# Patient Record
Sex: MALE | Race: WHITE | Employment: FULL TIME | URBAN - METROPOLITAN AREA
[De-identification: names, ages, dates, MRNs, and addresses within clinical notes are randomized per-mention and may not be internally consistent; named-entity substitution may affect disease eponyms.]

---

## 2018-02-12 ENCOUNTER — HOSPITAL ENCOUNTER (OUTPATIENT)
Dept: LAB | Age: 63
Discharge: HOME | End: 2018-02-12
Payer: COMMERCIAL

## 2018-02-12 ENCOUNTER — OFFICE VISIT (OUTPATIENT)
Dept: FAMILY MEDICINE CLINIC | Age: 63
End: 2018-02-12

## 2018-02-12 VITALS
SYSTOLIC BLOOD PRESSURE: 193 MMHG | RESPIRATION RATE: 18 BRPM | BODY MASS INDEX: 30.34 KG/M2 | HEART RATE: 67 BPM | DIASTOLIC BLOOD PRESSURE: 94 MMHG | WEIGHT: 224 LBS | TEMPERATURE: 96.7 F | OXYGEN SATURATION: 97 % | HEIGHT: 72 IN

## 2018-02-12 DIAGNOSIS — R33.9 URINARY RETENTION: ICD-10-CM

## 2018-02-12 DIAGNOSIS — N31.9 NEUROGENIC BLADDER: Primary | ICD-10-CM

## 2018-02-12 DIAGNOSIS — Z11.59 NEED FOR HEPATITIS C SCREENING TEST: ICD-10-CM

## 2018-02-12 DIAGNOSIS — I10 ESSENTIAL HYPERTENSION: ICD-10-CM

## 2018-02-12 DIAGNOSIS — E78.5 HYPERLIPIDEMIA, UNSPECIFIED HYPERLIPIDEMIA TYPE: ICD-10-CM

## 2018-02-12 DIAGNOSIS — N40.0 BENIGN PROSTATIC HYPERPLASIA, UNSPECIFIED WHETHER LOWER URINARY TRACT SYMPTOMS PRESENT: ICD-10-CM

## 2018-02-12 LAB
ALBUMIN SERPL-MCNC: 4.6 G/DL (ref 3.4–5)
ALBUMIN/GLOB SERPL: 1.4 {RATIO} (ref 0.8–1.7)
ALP SERPL-CCNC: 85 U/L (ref 45–117)
ALT SERPL-CCNC: 36 U/L (ref 16–61)
AMORPH CRY URNS QL MICRO: ABNORMAL
ANION GAP SERPL CALC-SCNC: 11 MMOL/L (ref 3–18)
APPEARANCE UR: ABNORMAL
AST SERPL-CCNC: 25 U/L (ref 15–37)
BACTERIA URNS QL MICRO: ABNORMAL /HPF
BASOPHILS # BLD: 0.1 K/UL (ref 0–0.06)
BASOPHILS NFR BLD: 1 % (ref 0–2)
BILIRUB SERPL-MCNC: 0.5 MG/DL (ref 0.2–1)
BILIRUB UR QL STRIP: NEGATIVE
BILIRUB UR QL: NEGATIVE
BUN SERPL-MCNC: 19 MG/DL (ref 7–18)
BUN/CREAT SERPL: 18 (ref 12–20)
CALCIUM SERPL-MCNC: 9.3 MG/DL (ref 8.5–10.1)
CHLORIDE SERPL-SCNC: 102 MMOL/L (ref 100–108)
CHOLEST SERPL-MCNC: 142 MG/DL
CO2 SERPL-SCNC: 28 MMOL/L (ref 21–32)
COLOR UR: YELLOW
CREAT SERPL-MCNC: 1.06 MG/DL (ref 0.6–1.3)
DIFFERENTIAL METHOD BLD: ABNORMAL
EOSINOPHIL # BLD: 0 K/UL (ref 0–0.4)
EOSINOPHIL NFR BLD: 1 % (ref 0–5)
EPITH CASTS URNS QL MICRO: ABNORMAL /LPF (ref 0–5)
ERYTHROCYTE [DISTWIDTH] IN BLOOD BY AUTOMATED COUNT: 14.3 % (ref 11.6–14.5)
GLOBULIN SER CALC-MCNC: 3.3 G/DL (ref 2–4)
GLUCOSE SERPL-MCNC: 97 MG/DL (ref 74–99)
GLUCOSE UR STRIP.AUTO-MCNC: NEGATIVE MG/DL
GLUCOSE UR-MCNC: NEGATIVE MG/DL
HCT VFR BLD AUTO: 54.3 % (ref 36–48)
HDLC SERPL-MCNC: 34 MG/DL (ref 40–60)
HDLC SERPL: 4.2 {RATIO} (ref 0–5)
HGB BLD-MCNC: 18.3 G/DL (ref 13–16)
HGB UR QL STRIP: NEGATIVE
KETONES P FAST UR STRIP-MCNC: NEGATIVE MG/DL
KETONES UR QL STRIP.AUTO: NEGATIVE MG/DL
LDLC SERPL CALC-MCNC: 83 MG/DL (ref 0–100)
LEUKOCYTE ESTERASE UR QL STRIP.AUTO: ABNORMAL
LIPID PROFILE,FLP: ABNORMAL
LYMPHOCYTES # BLD: 2 K/UL (ref 0.9–3.6)
LYMPHOCYTES NFR BLD: 34 % (ref 21–52)
MCH RBC QN AUTO: 30.7 PG (ref 24–34)
MCHC RBC AUTO-ENTMCNC: 33.7 G/DL (ref 31–37)
MCV RBC AUTO: 91 FL (ref 74–97)
MONOCYTES # BLD: 0.8 K/UL (ref 0.05–1.2)
MONOCYTES NFR BLD: 14 % (ref 3–10)
NEUTS SEG # BLD: 2.9 K/UL (ref 1.8–8)
NEUTS SEG NFR BLD: 50 % (ref 40–73)
NITRITE UR QL STRIP.AUTO: NEGATIVE
PH UR STRIP: 7 [PH] (ref 4.6–8)
PH UR STRIP: 7.5 [PH] (ref 5–8)
PLATELET # BLD AUTO: 145 K/UL (ref 135–420)
PMV BLD AUTO: 12.1 FL (ref 9.2–11.8)
POTASSIUM SERPL-SCNC: 4.4 MMOL/L (ref 3.5–5.5)
PROT SERPL-MCNC: 7.9 G/DL (ref 6.4–8.2)
PROT UR QL STRIP: NORMAL
PROT UR STRIP-MCNC: ABNORMAL MG/DL
RBC # BLD AUTO: 5.97 M/UL (ref 4.7–5.5)
RBC #/AREA URNS HPF: 0 /HPF (ref 0–5)
SODIUM SERPL-SCNC: 141 MMOL/L (ref 136–145)
SP GR UR REFRACTOMETRY: 1.02 (ref 1–1.03)
SP GR UR STRIP: 1.01 (ref 1–1.03)
TRIGL SERPL-MCNC: 125 MG/DL (ref ?–150)
UA UROBILINOGEN AMB POC: NORMAL (ref 0.2–1)
URINALYSIS CLARITY POC: CLEAR
URINALYSIS COLOR POC: YELLOW
URINE BLOOD POC: NEGATIVE
URINE LEUKOCYTES POC: NORMAL
URINE NITRITES POC: NEGATIVE
UROBILINOGEN UR QL STRIP.AUTO: 0.2 EU/DL (ref 0.2–1)
VLDLC SERPL CALC-MCNC: 25 MG/DL
WBC # BLD AUTO: 5.8 K/UL (ref 4.6–13.2)
WBC URNS QL MICRO: ABNORMAL /HPF (ref 0–4)

## 2018-02-12 PROCEDURE — 87086 URINE CULTURE/COLONY COUNT: CPT | Performed by: FAMILY MEDICINE

## 2018-02-12 PROCEDURE — 81001 URINALYSIS AUTO W/SCOPE: CPT | Performed by: FAMILY MEDICINE

## 2018-02-12 PROCEDURE — 80053 COMPREHEN METABOLIC PANEL: CPT | Performed by: FAMILY MEDICINE

## 2018-02-12 PROCEDURE — 80061 LIPID PANEL: CPT | Performed by: FAMILY MEDICINE

## 2018-02-12 PROCEDURE — 86803 HEPATITIS C AB TEST: CPT | Performed by: FAMILY MEDICINE

## 2018-02-12 PROCEDURE — 87186 SC STD MICRODIL/AGAR DIL: CPT | Performed by: FAMILY MEDICINE

## 2018-02-12 PROCEDURE — 84153 ASSAY OF PSA TOTAL: CPT | Performed by: UROLOGY

## 2018-02-12 PROCEDURE — 87077 CULTURE AEROBIC IDENTIFY: CPT | Performed by: FAMILY MEDICINE

## 2018-02-12 PROCEDURE — 85025 COMPLETE CBC W/AUTO DIFF WBC: CPT | Performed by: FAMILY MEDICINE

## 2018-02-12 RX ORDER — LOSARTAN POTASSIUM AND HYDROCHLOROTHIAZIDE 12.5; 1 MG/1; MG/1
1 TABLET ORAL DAILY
COMMUNITY
Start: 2018-02-12 | End: 2018-07-19 | Stop reason: SDUPTHER

## 2018-02-12 RX ORDER — ATORVASTATIN CALCIUM 40 MG/1
40 TABLET, FILM COATED ORAL DAILY
COMMUNITY
Start: 2018-02-12 | End: 2018-02-26

## 2018-02-12 NOTE — MR AVS SNAPSHOT
[facility] [address] [facility] 
[phone] Patient: Calvin Sifuentes MRN: [id number] AHD:9/85/[id number] Visit Information Date & Time Provider Department Dept. Phone Encounter #  
 2/12/2018  9:30 AM MD Jazmine Case. [address] [place] Lindsay Berry [phone] [id number] Follow-up Instructions Return in about 2 weeks (around 2/26/2018), or if symptoms worsen or fail to improve, for htn, dyslipidemia. 9/13/2018  9:45 AM  
Any with Brent Novoa MD  
[facility] ([facility]) Appt Note: EP- BPH, Bladder Stone, UTI, 8 month follow up [address] [place] [id number]  
[address] [facility] [address] [facility] [address] [facility] [address] Upcoming Health Maintenance Date Due Hepatitis C Screening 1955 FOBT Q 1 YEAR AGE 50-75 3/26/2005 Pneumococcal 19-64 Highest Risk (2 of 3 - PCV13) 8/4/2018 DTaP/Tdap/Td series (2 - Td) 2/12/2028 Allergies as of 2/12/2018  Review Complete On: 2/12/2018 By: Shanon Girard MD  
  
 Severity Noted Reaction Type Reactions Penicillins  04/20/2012    Other (comments) Current Immunizations  Never Reviewed No immunizations on file. Not reviewed this visit You Were Diagnosed With   
  
 Codes Comments Neurogenic bladder    -  Primary ICD-10-CM: N31.9 ICD-9-CM: 596.54 Essential hypertension     ICD-10-CM: I10 
ICD-9-CM: 401.9 Hyperlipidemia, unspecified hyperlipidemia type     ICD-10-CM: E78.5 ICD-9-CM: 272.4 Need for hepatitis C screening test     ICD-10-CM: Z11.59 
ICD-9-CM: V73.89 Urinary retention     ICD-10-CM: R33.9 ICD-9-CM: 788.20 Vitals BP Pulse Temp Resp Height(growth percentile) Weight(growth percentile) (!) 193/94 (BP 1 Location: Right arm, BP Patient Position: Sitting) 67 96.7 °F (35.9 °C) (Oral) 18 6' (1.829 m) 224 lb (101.6 kg) SpO2 BMI Smoking Status 97% 30.38 kg/m2 Never Smoker Vitals History BMI and BSA Data Body Mass Index Body Surface Area  
 30.38 kg/m 2 2.27 m 2 Preferred Pharmacy Pharmacy Name Phone [facility] [address] [phone] Your Updated Medication List  
  
   
This list is accurate as of: 2/12/18 10:19 AM.  Always use your most recent med list.  
  
  
  
  
 atorvastatin 40 mg tablet Commonly known as:  LIPITOR Take 1 Tab by mouth daily. losartan-hydroCHLOROthiazide 100-12.5 mg per tablet Commonly known as:  HYZAAR Take 1 Tab by mouth daily. multivitamin tablet Commonly known as:  ONE A DAY Take 1 Tab by mouth daily. nitrofurantoin (macrocrystal-monohydrate) 100 mg capsule Commonly known as:  MACROBID Take 1 Cap by mouth two (2) times a day. Indications: UTI  
  
 VITAMIN B-12 1,000 mcg tablet Generic drug:  cyanocobalamin Take 1,000 mcg by mouth daily. We Performed the Following AMB POC URINALYSIS DIP STICK AUTO W/O MICRO [94753 CPT(R)] Follow-up Instructions Return in about 2 weeks (around 2/26/2018), or if symptoms worsen or fail to improve, for htn, dyslipidemia. To-Do List   
 02/12/2018 Lab:  CBC WITH AUTOMATED DIFF   
  
 02/12/2018 Microbiology:  CULTURE, URINE   
  
 02/12/2018 Lab:  HEPATITIS C AB   
  
 02/12/2018 Lab:  LIPID PANEL   
  
 02/12/2018 Lab:  METABOLIC PANEL, COMPREHENSIVE   
  
 02/12/2018 Lab:  URINALYSIS W/MICROSCOPIC Patient Instructions DASH Diet: Care Instructions Your Care Instructions The DASH diet is an eating plan that can help lower your blood pressure. DASH stands for Dietary Approaches to Stop Hypertension. Hypertension is high blood pressure.  
The DASH diet focuses on eating foods that are high in calcium, potassium, and magnesium. These nutrients can lower blood pressure. The foods that are highest in these nutrients are fruits, vegetables, low-fat dairy products, nuts, seeds, and legumes. But taking calcium, potassium, and magnesium supplements instead of eating foods that are high in those nutrients does not have the same effect. The DASH diet also includes whole grains, fish, and poultry. The DASH diet is one of several lifestyle changes your doctor may recommend to lower your high blood pressure. Your doctor may also want you to decrease the amount of sodium in your diet. Lowering sodium while following the DASH diet can lower blood pressure even further than just the DASH diet alone. Follow-up care is a key part of your treatment and safety. Be sure to make and go to all appointments, and call your doctor if you are having problems. It's also a good idea to know your test results and keep a list of the medicines you take. How can you care for yourself at home? Following the DASH diet · Eat 4 to 5 servings of fruit each day. A serving is 1 medium-sized piece of fruit, ½ cup chopped or canned fruit, 1/4 cup dried fruit, or 4 ounces (½ cup) of fruit juice. Choose fruit more often than fruit juice. · Eat 4 to 5 servings of vegetables each day. A serving is 1 cup of lettuce or raw leafy vegetables, ½ cup of chopped or cooked vegetables, or 4 ounces (½ cup) of vegetable juice. Choose vegetables more often than vegetable juice. · Get 2 to 3 servings of low-fat and fat-free dairy each day. A serving is 8 ounces of milk, 1 cup of yogurt, or 1 ½ ounces of cheese. · Eat 6 to 8 servings of grains each day. A serving is 1 slice of bread, 1 ounce of dry cereal, or ½ cup of cooked rice, pasta, or cooked cereal. Try to choose whole-grain products as much as possible. · Limit lean meat, poultry, and fish to 2 servings each day. A serving is 3 ounces, about the size of a deck of cards. · Eat 4 to 5 servings of nuts, seeds, and legumes (cooked dried beans, lentils, and split peas) each week. A serving is 1/3 cup of nuts, 2 tablespoons of seeds, or ½ cup of cooked beans or peas. · Limit fats and oils to 2 to 3 servings each day. A serving is 1 teaspoon of vegetable oil or 2 tablespoons of salad dressing. · Limit sweets and added sugars to 5 servings or less a week. A serving is 1 tablespoon jelly or jam, ½ cup sorbet, or 1 cup of lemonade. · Eat less than 2,300 milligrams (mg) of sodium a day. If you limit your sodium to 1,500 mg a day, you can lower your blood pressure even more. Tips for success · Start small. Do not try to make dramatic changes to your diet all at once. You might feel that you are missing out on your favorite foods and then be more likely to not follow the plan. Make small changes, and stick with them. Once those changes become habit, add a few more changes. · Try some of the following: ¨ Make it a goal to eat a fruit or vegetable at every meal and at snacks. This will make it easy to get the recommended amount of fruits and vegetables each day. ¨ Try yogurt topped with fruit and nuts for a snack or healthy dessert. ¨ Add lettuce, tomato, cucumber, and onion to sandwiches. ¨ Combine a ready-made pizza crust with low-fat mozzarella cheese and lots of vegetable toppings. Try using tomatoes, squash, spinach, broccoli, carrots, cauliflower, and onions. ¨ Have a variety of cut-up vegetables with a low-fat dip as an appetizer instead of chips and dip. ¨ Sprinkle sunflower seeds or chopped almonds over salads. Or try adding chopped walnuts or almonds to cooked vegetables. ¨ Try some vegetarian meals using beans and peas. Add garbanzo or kidney beans to salads. Make burritos and tacos with mashed dotson beans or black beans. Where can you learn more? Go to http://amaya-pepito.info/.  
Enter E623 in the search box to learn more about \"DASH Diet: Care Instructions. \" Current as of: September 21, 2016 Content Version: 11.4 © 5916-7121 Phone.com. Care instructions adapted under license by [employer] (which disclaims liability or warranty for this information). If you have questions about a medical condition or this instruction, always ask your healthcare professional. [address] any warranty or liability for your use of this information. Introducing [facility]! Dear Stephan Price: Thank you for requesting a G[employer] account. Our records indicate that you already have an active Gamer Gu[employer] account. You can access your account anytime at [email or web address] Did you know that you can access your hospital and ER discharge instructions at any time in Gamer Guides? You can also review all of your test results from your hospital stay or ER visit. Additional Information If you have questions, please visit the Frequently Asked Questions section of the [employer]amer [employer]ides website at [email or web address] Remember, [employer]amer Guides is NOT to be used for urgent needs. For medical emergencies, dial 911. Now available from your iPhone and Android! Please provide this summary of care documentation to your next provider. Your primary care clinician is listed as Nat Sellers. If you have any questions after today's visit, please call [phone].

## 2018-02-12 NOTE — PATIENT INSTRUCTIONS

## 2018-02-12 NOTE — PROGRESS NOTES
Chief Complaint   Patient presents with   Cezar Garcia Establish Care     1. Have you been to the ER, urgent care clinic since your last visit? Hospitalized since your last visit? No    2. Have you seen or consulted any other health care providers outside of the [address] [place] [address] since your last visit? Include any pap smears or colon screening. No     BAILEY Chrsitensen comes in  to establish care. Hypertension: Patient has a history of hypertension. Lately has been off blood pressure medication. This has been for about a year. He does have a blood pressure cuff at home that he checks his blood pressure has been stable. Systolics have been around 170. Diastolics have been below 80. Today however his blood pressure is high with a systolic of 783. Diastolic is slightly elevated. We discussed the needs to control blood pressure. He does understand the risks of elevated blood pressure. He denies headache, changes in vision or focal weakness. In the past he has used Hyzaar for his headache. I did advise that he take 1 tablet of Hyzaar today. After that he will wants to keep a blood pressure log of his numbers and follow-up in about 2 weeks. If elevated he is willing to get back on his medication. Will check his labs today. Urology: Patient has a history of neurogenic bladder and bladder stones. He does do the current self-catheterization. Has been seen by urology and the is on nitrofurantoin. Currently has some discomfort when he passes urine. We did do a urinalysis that is essentially stable. We will send it in for urine culture. Patient also has a history of enlarged prostate and we will do PSA screening today. This has been placed in previously by his neurologist.  Dyslipidemia: Patient has a history of dyslipidemia. Was put on atorvastatin but is no longer taking this medication. I will check his lipid panel. May need to restart medication depending on what his numbers are.     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  multivitamin (ONE A DAY) tablet Take 1 Tab by mouth daily.  nitrofurantoin, macrocrystal-monohydrate, (MACROBID) 100 mg capsule Take 1 Cap by mouth two (2) times a day. Indications: UTI 20 Cap 0       Allergies  Allergies   Allergen Reactions    Penicillins Other (comments)       Family History  Family History   Problem Relation Age of Onset    Cancer Mother     Hypertension Mother     Heart Disease Father     Diabetes Brother        Social History  Social History     Social History    Marital status:      Spouse name: N/A    Number of children: N/A    Years of education: N/A     Occupational History    Not on file.      Social History Main Topics    Smoking status: Never Smoker    Smokeless tobacco: Never Used    Alcohol use No      Comment: Occasionally    Drug use: No    Sexual activity: Yes     Other Topics Concern    Not on file     Social History Narrative       Review of Systems  Review of Systems - History obtained from the patient  General ROS: positive for  - fatigue and malaise  negative for - chills or fever  Psychological ROS: negative  Ophthalmic ROS: negative  ENT ROS: negative  Allergy and Immunology ROS: negative  Hematological and Lymphatic ROS: negative  Endocrine ROS: negative  Respiratory ROS: no cough, shortness of breath, or wheezing  Cardiovascular ROS: no chest pain or dyspnea on exertion  Gastrointestinal ROS: no abdominal pain, change in bowel habits, or black or bloody stools  Genito-Urinary ROS: History of nephrolithiasis, neurogenic bladder  Musculoskeletal ROS: negative  Neurological ROS: negative    Vital Signs  Visit Vitals    BP (!) 193/94 (BP 1 Location: Right arm, BP Patient Position: Sitting)    Pulse 67    Temp 96.7 °F (35.9 °C) (Oral)    Resp 18    Ht 6' (1.829 m)    Wt 224 lb (101.6 kg)    SpO2 97%    BMI 30.38 kg/m2         Physical Exam  Physical Examination: General appearance - oriented to person, place, and time, overweight, acyanotic, in no respiratory distress and well hydrated  Mental status - alert, oriented to person, place, and time, normal mood, behavior, speech, dress, motor activity, and thought processes  Eyes - sclera anicteric  Ears - bilateral TM's and external ear canals normal  Nose - normal and patent, no erythema, discharge or polyps and normal nontender sinuses  Mouth - mucous membranes moist, pharynx normal without lesions  Neck - supple, no significant adenopathy  Lymphatics - no palpable lymphadenopathy, no hepatosplenomegaly  Chest - clear to auscultation, no wheezes, rales or rhonchi, symmetric air entry, no tachypnea, retractions or cyanosis  Heart - normal rate, regular rhythm, normal S1, S2, no murmurs, rubs, clicks or gallops  Abdomen - soft, nontender, nondistended, no masses or organomegaly  no rebound tenderness noted  bowel sounds normal  Back exam - full range of motion, no tenderness, palpable spasm or pain on motion  Neurological - alert, oriented, normal speech, no focal findings or movement disorder noted, motor and sensory grossly normal bilaterally  Musculoskeletal - full range of motion without pain  Extremities - no pedal edema noted, intact peripheral pulses    Results  Results for orders placed or performed in visit on 02/12/18   AMB POC URINALYSIS DIP STICK AUTO W/O MICRO   Result Value Ref Range    Color (UA POC) Yellow     Clarity (UA POC) Clear     Glucose (UA POC) Negative Negative    Bilirubin (UA POC) Negative Negative    Ketones (UA POC) Negative Negative    Specific gravity (UA POC) 1.015 1.001 - 1.035    Blood (UA POC) Negative Negative    pH (UA POC) 7.0 4.6 - 8.0    Protein (UA POC) 1+ Negative    Urobilinogen (UA POC) 0.2 mg/dL 0.2 - 1    Nitrites (UA POC) Negative Negative    Leukocyte esterase (UA POC) 1+ Negative       ASSESSMENT and PLAN      ICD-10-CM ICD-9-CM    1. Neurogenic bladder N31.9 596.54 AMB POC URINALYSIS DIP STICK AUTO W/O MICRO      COLLECTION VENOUS BLOOD,VENIPUNCTURE   2. Essential hypertension I10 401.9 CBC WITH AUTOMATED DIFF      LIPID PANEL      METABOLIC PANEL, COMPREHENSIVE      COLLECTION VENOUS BLOOD,VENIPUNCTURE   3. Hyperlipidemia, unspecified hyperlipidemia type E78.5 272.4 COLLECTION VENOUS BLOOD,VENIPUNCTURE   4. Need for hepatitis C screening test Z11.59 V73.89 HEPATITIS C AB      COLLECTION VENOUS BLOOD,VENIPUNCTURE   5. Urinary retention R33.9 788.20 URINALYSIS W/MICROSCOPIC      CULTURE, URINE     lab results and schedule of future lab studies reviewed with patient  reviewed diet, exercise and weight control  cardiovascular risk and specific lipid/LDL goals reviewed  reviewed medications and side effects in detail      I have discussed the diagnosis with the patient and the intended plan of care as seen in the above orders. The patient has received an after-visit summary and questions were answered concerning future plans. I have discussed medication, side effects, and warnings with the patient in detail. The patient verbalized understanding and is in agreement with the plan of care. The patient will contact the office with any additional concerns.     Tae Escoto MD

## 2018-02-13 LAB
HCV AB SER IA-ACNC: 0.08 INDEX
HCV AB SERPL QL IA: NEGATIVE
HCV COMMENT,HCGAC: NORMAL
PSA SERPL-MCNC: 5.6 NG/ML (ref 0–4)

## 2018-02-14 LAB
BACTERIA SPEC CULT: ABNORMAL
SERVICE CMNT-IMP: ABNORMAL

## 2018-02-14 NOTE — PROGRESS NOTES
Call made to pt, no answer, received pt's voicemail. Left message for pt to give the office a call in reference to his lab result.

## 2018-02-14 NOTE — PROGRESS NOTES
Received return call from pt and 2 identifiers used. Pt made aware of above message and to continue with his medication as prescribed and see the urologist. Pt understands.

## 2018-02-14 NOTE — PROGRESS NOTES
Please let patient know that his urine culture report did grow out some bacteria. He should continue with the nitrofurantoin and should follow-up with his urologist.  Rest of his labs are stable.   Zach Oliveros MD

## 2018-02-26 ENCOUNTER — OFFICE VISIT (OUTPATIENT)
Dept: FAMILY MEDICINE CLINIC | Age: 63
End: 2018-02-26

## 2018-02-26 VITALS
HEIGHT: 72 IN | SYSTOLIC BLOOD PRESSURE: 154 MMHG | RESPIRATION RATE: 18 BRPM | TEMPERATURE: 97.9 F | DIASTOLIC BLOOD PRESSURE: 89 MMHG | HEART RATE: 82 BPM | BODY MASS INDEX: 31.02 KG/M2 | OXYGEN SATURATION: 96 % | WEIGHT: 229 LBS

## 2018-02-26 DIAGNOSIS — I10 ESSENTIAL HYPERTENSION: Primary | ICD-10-CM

## 2018-02-26 DIAGNOSIS — N20.0 NEPHROLITHIASIS: ICD-10-CM

## 2018-02-26 DIAGNOSIS — N31.9 NEUROGENIC BLADDER: ICD-10-CM

## 2018-02-26 NOTE — PATIENT INSTRUCTIONS
Learning About Diet for Kidney Stone Prevention  What are kidney stones? Kidney stones are made of salts and minerals in the urine that form small \"geoff. \" Stones can form in the kidneys and the ureters (the tubes that lead from the kidneys to the bladder). They can also form in the bladder. Stones may not cause a problem as long as they stay in the kidneys. But they can cause sudden, severe pain. Pain is most likely when the stones travel from the kidneys to the bladder. Kidney stones can cause bloody urine. Kidney stones often run in families. You are more likely to get them if you don't drink enough fluids, mainly water. Certain foods and drinks and some dietary supplements may also increase your risk for kidney stones if you consume too much of them. What can you do to prevent kidney stones? Changing what you eat may not prevent all types of kidney stones. But for people who have a history of certain kinds of kidney stones, some changes in diet may help. A dietitian can help you set up a meal plan that includes healthy, low-oxalate choices. Here are some general guidelines to get you started. Plan your meals and snacks around foods that are low in oxalate. These foods include:  · Corn, kale, parsnips, and squash,. · Beef, chicken, pork, turkey, and fish. · Milk, butter, cheese, and yogurt. You can eat certain foods that are medium-high in oxalate, but eat them only once in a while. These foods include:  · Bread. · Brown rice. · English muffins. · Figs. · Popcorn. · String beans. · Tomatoes. Limit very high-oxalate foods, including:  · Black tea. · Coffee. · Chocolate. · Dark green vegetables. · Nuts. Here are some other things you can do to help prevent kidney stones. · Drink plenty of fluids. If you have kidney, heart, or liver disease and have to limit fluids, talk with your doctor before you increase the amount of fluids you drink.   · Do not take more than the recommended daily dose of vitamins C and D.  · Limit the salt in your diet. · Eat a balanced diet that is not too high in protein. Follow-up care is a key part of your treatment and safety. Be sure to make and go to all appointments, and call your doctor if you are having problems. It's also a good idea to know your test results and keep a list of the medicines you take. Where can you learn more? Go to http://amaya-pepito.info/. Enter C138 in the search box to learn more about \"Learning About Diet for Kidney Stone Prevention. \"  Current as of: May 12, 2017  Content Version: 11.4  © 0055-4486 [employer]. Care instructions adapted under license by [employer] (which disclaims liability or warranty for this information). If you have questions about a medical condition or this instruction, always ask your healthcare professional. [address] any warranty or liability for your use of this information.

## 2018-02-26 NOTE — PROGRESS NOTES
Chief Complaint   Patient presents with    Hypertension    Cholesterol Problem     Patient in today for HTn and cholesterol follow-up. 1. Have you been to the ER, urgent care clinic since your last visit? Hospitalized since your last visit? No    2. Have you seen or consulted any other health care providers outside of the [address] [place] [address] since your last visit? Include any pap smears or colon screening. No     [facility]  Dearl Post comes in for follow-up care. Hypertension: Patient comes in for follow-up of hypertension. He has been doing lifestyle and dietary modification. He is exercising more. He is trying to eat healthy. He does have a blood pressure log. Numbers in the morning have been elevated in the 140s-150s mainly. Evening numbers have been better. Today his blood pressure is 153/87. We discussed blood pressure control and various options. I will have him continue with his lifestyle and and dietary modification. We will have him take Hyzaar 1 tablet daily. He will keep a daily blood pressure log. Plan to wean him off the medication if his blood pressure stays stable. Dyslipidemia: Patient's cholesterol level is within normal.  He is doing dietary and lifestyle modification. We will continue with this for now. Kidney stones: Patient has a history of nephrolithiasis. Has been followed up by the urologist.  I will give him some dietary suggestions to prevent kidney stones. His renal function tests were stable. Patient has a history of neurogenic bladder and he does self catheterizations. Most recently was treated with Macrobid for UTI.     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  multivitamin (ONE A DAY) tablet Take 1 Tab by mouth daily.  losartan-hydroCHLOROthiazide (HYZAAR) 100-12.5 mg per tablet Take 1 Tab by mouth daily. Allergies  Allergies   Allergen Reactions    Penicillins Other (comments)       Family History  Family History   Problem Relation Age of Onset    Cancer Mother     Hypertension Mother     Heart Disease Father     Diabetes Brother        Social History  Social History     Social History    Marital status:      Spouse name: N/A    Number of children: N/A    Years of education: N/A     Occupational History    Not on file. Social History Main Topics    Smoking status: Never Smoker    Smokeless tobacco: Never Used    Alcohol use No      Comment: Occasionally    Drug use: No    Sexual activity: Yes     Other Topics Concern    Not on file     Social History Narrative       Review of Systems  Review of Systems - History obtained from chart review and the patient  General ROS: negative for - chills, fever or malaise  Psychological ROS: negative  Ophthalmic ROS: negative  Respiratory ROS: no cough, shortness of breath, or wheezing  Cardiovascular ROS: no chest pain or dyspnea on exertion  Gastrointestinal ROS: negative  Genito-Urinary ROS: no dysuria, trouble voiding, or hematuria, does do self-catheterization.    Musculoskeletal ROS: negative  Neurological ROS: negative    Vital Signs  Visit Vitals    /89 (BP 1 Location: Right arm, BP Patient Position: Sitting)    Pulse 82    Temp 97.9 °F (36.6 °C)    Resp 18    Ht 6' (1.829 m)    Wt 229 lb (103.9 kg)    SpO2 96%    BMI 31.06 kg/m2         Physical Exam  Physical Examination: General appearance - alert, well appearing, and in no distress, oriented to person, place, and time and acyanotic, in no respiratory distress  Mental status - alert, oriented to person, place, and time  Eyes - sclera anicteric  Chest - no tachypnea, retractions or cyanosis  Heart - normal rate and regular rhythm, S1 and S2 normal  Neurological - motor and sensory grossly normal bilaterally  Musculoskeletal - full range of motion without pain  Extremities - intact peripheral pulses    Results  Results for orders placed or performed during the hospital encounter of 02/12/18   CULTURE, URINE   Result Value Ref Range    Special Requests: NO SPECIAL REQUESTS      Culture result: >100,000 COLONIES/mL ENTEROCOCCUS FAECALIS GROUP D (A)         Susceptibility    Enterococcus  faecalis group D - RESHMA     Ampicillin ($) <=2 Susceptible ug/mL     Penicillin G ($$) 4 Susceptible ug/mL     Ciprofloxacin ($) >=8 Resistant ug/mL     Gentamicin Synergy S  Resistant ug/mL     Levofloxacin ($) >=8 Resistant ug/mL     Nitrofurantoin <=16 Susceptible ug/mL     Streptomycin Synergy  Resistant ug/mL     Vancomycin ($) 1 Susceptible ug/mL   CBC WITH AUTOMATED DIFF   Result Value Ref Range    WBC 5.8 4.6 - 13.2 K/uL    RBC 5.97 (H) 4.70 - 5.50 M/uL    HGB 18.3 (H) 13.0 - 16.0 g/dL    HCT 54.3 (H) 36.0 - 48.0 %    MCV 91.0 74.0 - 97.0 FL    MCH 30.7 24.0 - 34.0 PG    MCHC 33.7 31.0 - 37.0 g/dL    RDW 14.3 11.6 - 14.5 %    PLATELET 324 791 - 040 K/uL    MPV 12.1 (H) 9.2 - 11.8 FL    NEUTROPHILS 50 40 - 73 %    LYMPHOCYTES 34 21 - 52 %    MONOCYTES 14 (H) 3 - 10 %    EOSINOPHILS 1 0 - 5 %    BASOPHILS 1 0 - 2 %    ABS. NEUTROPHILS 2.9 1.8 - 8.0 K/UL    ABS. LYMPHOCYTES 2.0 0.9 - 3.6 K/UL    ABS. MONOCYTES 0.8 0.05 - 1.2 K/UL    ABS. EOSINOPHILS 0.0 0.0 - 0.4 K/UL    ABS.  BASOPHILS 0.1 (H) 0.0 - 0.06 K/UL    DF AUTOMATED     LIPID PANEL   Result Value Ref Range    LIPID PROFILE          Cholesterol, total 142 <200 MG/DL    Triglyceride 125 <150 MG/DL    HDL Cholesterol 34 (L) 40 - 60 MG/DL    LDL, calculated 83 0 - 100 MG/DL    VLDL, calculated 25 MG/DL    CHOL/HDL Ratio 4.2 0 - 5.0     METABOLIC PANEL, COMPREHENSIVE   Result Value Ref Range    Sodium 141 136 - 145 mmol/L    Potassium 4.4 3.5 - 5.5 mmol/L    Chloride 102 100 - 108 mmol/L    CO2 28 21 - 32 mmol/L    Anion gap 11 3.0 - 18 mmol/L    Glucose 97 74 - 99 mg/dL    BUN 19 (H) 7.0 - 18 MG/DL    Creatinine 1.06 0.6 - 1.3 MG/DL    BUN/Creatinine ratio 18 12 - 20      GFR est AA >60 >60 ml/min/1.73m2    GFR est non-AA >60 >60 ml/min/1.73m2    Calcium 9.3 8.5 - 10.1 MG/DL    Bilirubin, total 0.5 0.2 - 1.0 MG/DL    ALT (SGPT) 36 16 - 61 U/L    AST (SGOT) 25 15 - 37 U/L    Alk. phosphatase 85 45 - 117 U/L    Protein, total 7.9 6.4 - 8.2 g/dL    Albumin 4.6 3.4 - 5.0 g/dL    Globulin 3.3 2.0 - 4.0 g/dL    A-G Ratio 1.4 0.8 - 1.7     HEPATITIS C AB   Result Value Ref Range    Hepatitis C virus Ab 0.08 <0.80 Index    Hep C  virus Ab Interp. NEGATIVE  NEG      Hep C  virus Ab comment         URINALYSIS W/MICROSCOPIC   Result Value Ref Range    Color YELLOW      Appearance CLOUDY      Specific gravity 1.018 1.005 - 1.030      pH (UA) 7.5 5.0 - 8.0      Protein TRACE (A) NEG mg/dL    Glucose NEGATIVE  NEG mg/dL    Ketone NEGATIVE  NEG mg/dL    Bilirubin NEGATIVE  NEG      Blood NEGATIVE  NEG      Urobilinogen 0.2 0.2 - 1.0 EU/dL    Nitrites NEGATIVE  NEG      Leukocyte Esterase MODERATE (A) NEG      WBC 10 to 15 0 - 4 /hpf    RBC 0 0 - 5 /hpf    Epithelial cells FEW 0 - 5 /lpf    Bacteria 1+ (A) NEG /hpf    Amorphous Crystals 2+ (A) NEG       ASSESSMENT and PLAN    ICD-10-CM ICD-9-CM    1. Essential hypertension I10 401.9    2. Nephrolithiasis N20.0 592.0    3.  Neurogenic bladder N31.9 596.54      lab results and schedule of future lab studies reviewed with patient  reviewed diet, exercise and weight control  cardiovascular risk and specific lipid/LDL goals reviewed  reviewed medications and side effects in detail    I have discussed the diagnosis with the patient and the intended plan of care as seen in the above orders. The patient has received an after-visit summary and questions were answered concerning future plans. I have discussed medication, side effects, and warnings with the patient in detail. The patient verbalized understanding and is in agreement with the plan of care. The patient will contact the office with any additional concerns.     Mani Busch MD

## 2018-02-26 NOTE — MR AVS SNAPSHOT
[facility] [address] [facility] 
[phone] Patient: Hilary Perez MRN: [id number] ATV:[id number] Visit Information Date & Time Provider Department Dept. Phone Encounter #  
 2/26/2018  8:45 AM Nat Fritz  [facility] [address] [phone] [id number] Follow-up Instructions Return in about 3 months (around 5/26/2018), or if symptoms worsen or fail to improve, for htn, dyslipidemia. 9/13/2018  9:45 AM  
Any with Lin Perez MD  
[facility] ([facility]) Appt Note: EP- BPH, Bladder Stone, UTI, 8 month follow up  
 [address]  
  
   
 [address] [place] [address] Upcoming Health Maintenance Date Due FOBT Q 1 YEAR AGE 50-75 3/26/2005 Pneumococcal 19-64 Highest Risk (2 of 3 - PCV13) 8/4/2018 DTaP/Tdap/Td series (2 - Td) 2/12/2028 Allergies as of 2/26/2018  Review Complete On: 2/26/2018 By: Young Knutson MD  
  
 Severity Noted Reaction Type Reactions Penicillins  04/20/2012    Other (comments) Current Immunizations  Never Reviewed No immunizations on file. Not reviewed this visit You Were Diagnosed With   
  
 Codes Comments Essential hypertension    -  Primary ICD-10-CM: I10 
ICD-9-CM: 401.9 Vitals BP Pulse Temp Resp Height(growth percentile) Weight(growth percentile) 154/89 (BP 1 Location: Right arm, BP Patient Position: Sitting) 82 97.9 °F (36.6 °C) 18 6' (1.829 m) 229 lb (103.9 kg) SpO2 BMI Smoking Status 96% 31.06 kg/m2 Never Smoker BMI and BSA Data Body Mass Index Body Surface Area 31.06 kg/m 2 2.3 m 2 Preferred Pharmacy Pharmacy Name Phone [facility], [address] [place] [address] [phone] Your Updated Medication List  
  
   
This list is accurate as of 2/26/18  9:17 AM.  Always use your most recent med list.  
  
  
  
  
 losartan-hydroCHLOROthiazide 100-12.5 mg per tablet Commonly known as:  HYZAAR Take 1 Tab by mouth daily. multivitamin tablet Commonly known as:  ONE A DAY Take 1 Tab by mouth daily. VITAMIN B-12 1,000 mcg tablet Generic drug:  cyanocobalamin Take 1,000 mcg by mouth daily. Follow-up Instructions Return in about 3 months (around 5/26/2018), or if symptoms worsen or fail to improve, for htn, dyslipidemia. Patient Instructions Learning About Diet for Kidney Stone Prevention What are kidney stones? Kidney stones are made of salts and minerals in the urine that form small \"geoff. \" Stones can form in the kidneys and the ureters (the tubes that lead from the kidneys to the bladder). They can also form in the bladder. Stones may not cause a problem as long as they stay in the kidneys. But they can cause sudden, severe pain. Pain is most likely when the stones travel from the kidneys to the bladder. Kidney stones can cause bloody urine. Kidney stones often run in families. You are more likely to get them if you don't drink enough fluids, mainly water. Certain foods and drinks and some dietary supplements may also increase your risk for kidney stones if you consume too much of them. What can you do to prevent kidney stones? Changing what you eat may not prevent all types of kidney stones. But for people who have a history of certain kinds of kidney stones, some changes in diet may help. A dietitian can help you set up a meal plan that includes healthy, low-oxalate choices. Here are some general guidelines to get you started. Plan your meals and snacks around foods that are low in oxalate. These foods include: · Corn, kale, parsnips, and squash,. · Beef, chicken, pork, turkey, and fish. · Milk, butter, cheese, and yogurt. You can eat certain foods that are medium-high in oxalate, but eat them only once in a while. These foods include: · Bread. · Brown rice. · English muffins. · Figs. · Popcorn. · String beans. · Tomatoes. Limit very high-oxalate foods, including: · Black tea. · Coffee. · Chocolate. · Dark green vegetables. · Nuts. Here are some other things you can do to help prevent kidney stones. · Drink plenty of fluids. If you have kidney, heart, or liver disease and have to limit fluids, talk with your doctor before you increase the amount of fluids you drink. · Do not take more than the recommended daily dose of vitamins C and D. 
· Limit the salt in your diet. · Eat a balanced diet that is not too high in protein. Follow-up care is a key part of your treatment and safety. Be sure to make and go to all appointments, and call your doctor if you are having problems. It's also a good idea to know your test results and keep a list of the medicines you take. Where can you learn more? Go to http://amaya-pepito.info/. Enter C138 in the search box to learn more about \"Learning About Diet for Kidney Stone Prevention. \" Current as of: May 12, 2017 Content Version: 11.4 © 2919-1519 Healthwise, Incorporated. Care instructions adapted under license by [employer] (which disclaims liability or warranty for this information). If you have questions about a medical condition or this instruction, always ask your healthcare professional. [place] [address] any warranty or liability for your use of this information. Introducing [facility]! Dear Elizabeth Iqbal: Thank you for requesting a Spins[employer]FM account. Our records indicate that you already have an active [email or web address] account. You can access your account anytime at [email or web address] Did you know that you can access your hospital and ER discharge instructions at any time in [employer]ol[employer]a? You can also review all of your test results from your hospital stay or ER visit. Additional Information If you have questions, please visit the Frequently Asked Questions section of the [employer] website at [email or web address] [email or web address] [email or web address] [email or web address]/. Remember, [employer] is NOT to be used for urgent needs. For medical emergencies, dial 911. Now available from your iPhone and Android! Please provide this summary of care documentation to your next provider. Your primary care clinician is listed as Nat Sellers. If you have any questions after today's visit, please call [phone].

## 2018-05-29 ENCOUNTER — HOSPITAL ENCOUNTER (OUTPATIENT)
Dept: LAB | Age: 63
Discharge: HOME | End: 2018-05-29
Payer: COMMERCIAL

## 2018-05-29 ENCOUNTER — OFFICE VISIT (OUTPATIENT)
Dept: FAMILY MEDICINE CLINIC | Age: 63
End: 2018-05-29

## 2018-05-29 VITALS
HEIGHT: 72 IN | SYSTOLIC BLOOD PRESSURE: 144 MMHG | WEIGHT: 223 LBS | TEMPERATURE: 97.3 F | RESPIRATION RATE: 18 BRPM | BODY MASS INDEX: 30.2 KG/M2 | OXYGEN SATURATION: 96 % | DIASTOLIC BLOOD PRESSURE: 86 MMHG | HEART RATE: 68 BPM

## 2018-05-29 DIAGNOSIS — Z12.11 SCREEN FOR COLON CANCER: ICD-10-CM

## 2018-05-29 DIAGNOSIS — E78.00 HYPERCHOLESTEREMIA: ICD-10-CM

## 2018-05-29 DIAGNOSIS — N31.9 NEUROGENIC BLADDER: ICD-10-CM

## 2018-05-29 DIAGNOSIS — I10 ESSENTIAL HYPERTENSION: ICD-10-CM

## 2018-05-29 DIAGNOSIS — I10 ESSENTIAL HYPERTENSION: Primary | ICD-10-CM

## 2018-05-29 DIAGNOSIS — S69.92XA INJURY OF LEFT HAND, INITIAL ENCOUNTER: ICD-10-CM

## 2018-05-29 LAB
BILIRUB UR QL STRIP: NEGATIVE
ERYTHROCYTE [DISTWIDTH] IN BLOOD BY AUTOMATED COUNT: 15.3 % (ref 11.6–14.5)
GLUCOSE UR-MCNC: NEGATIVE MG/DL
HCT VFR BLD AUTO: 51.4 % (ref 36–48)
HGB BLD-MCNC: 16.9 G/DL (ref 13–16)
KETONES P FAST UR STRIP-MCNC: NEGATIVE MG/DL
MCH RBC QN AUTO: 30.8 PG (ref 24–34)
MCHC RBC AUTO-ENTMCNC: 32.9 G/DL (ref 31–37)
MCV RBC AUTO: 93.6 FL (ref 74–97)
PH UR STRIP: 7 [PH] (ref 4.6–8)
PLATELET # BLD AUTO: 190 K/UL (ref 135–420)
PMV BLD AUTO: 12.2 FL (ref 9.2–11.8)
PROT UR QL STRIP: NEGATIVE
RBC # BLD AUTO: 5.49 M/UL (ref 4.7–5.5)
SP GR UR STRIP: 1.02 (ref 1–1.03)
UA UROBILINOGEN AMB POC: NORMAL (ref 0.2–1)
URINALYSIS CLARITY POC: CLEAR
URINALYSIS COLOR POC: YELLOW
URINE BLOOD POC: NORMAL
URINE LEUKOCYTES POC: NORMAL
URINE NITRITES POC: NEGATIVE
WBC # BLD AUTO: 9.2 K/UL (ref 4.6–13.2)

## 2018-05-29 PROCEDURE — 80048 BASIC METABOLIC PNL TOTAL CA: CPT | Performed by: FAMILY MEDICINE

## 2018-05-29 PROCEDURE — 80061 LIPID PANEL: CPT | Performed by: FAMILY MEDICINE

## 2018-05-29 PROCEDURE — 85027 COMPLETE CBC AUTOMATED: CPT | Performed by: FAMILY MEDICINE

## 2018-05-29 RX ORDER — ASCORBIC ACID 250 MG
TABLET ORAL DAILY
COMMUNITY
End: 2019-10-14 | Stop reason: CLARIF

## 2018-05-29 NOTE — PROGRESS NOTES
Chief Complaint   Patient presents with    Follow-up     HTN, Dylipdemia      1. Have you been to the ER, urgent care clinic since your last visit? Hospitalized since your last visit? No    2. Have you comes in for follow-up care. Seen or consulted any other health care providers outside of the [address] [place] [address] since your last visit? Include any pap smears or colon screening. No     BAILEY Escalante comes in for follow-up care. Hypertension: Patient's blood pressure 144/86. States that at home he has been checking his blood pressure and did the numbers have been stable. Systolics have been mainly in the 130s. He is taking Hyzaar daily. Does not want to get any adjustment of medications at the moment. We will continue with the current treatment plan. I will follow-up at next visit. Urinary frequency: Patient has a history of neurogenic bladder and has been followed up by the urologist.  Occasionally does have urinary frequency. This has been ongoing lately and that he would like to check for infection in the urine. Denies fever, chills, flank pain or dysuria. Denies hematuria. Did a urinalysis that has 1+ leukocyte esterase. Rest of it is stable. For now will continue with supportive care. He will take adequate amount of fluids. We will follow-up in case of worsening symptoms. Dyslipidemia: Patient has history of dyslipidemia. Would like to have his cholesterol level checked. Order was sent in. Hand pain: Patient fell and the twisted his right hand. This did extend to a point where he felt his fingers being hyperextended. Hand was swollen. Swelling has gone down. Still has pain with gripping objects. He does have some discomfort with swelling along the metacarpal of the forefinger. Wonders about x-ray of the hand to rule out any fracture or abnormality. I will place an order for hand x-ray. Xray without acute abnormality as per my read.   I would follow up with the radiologist results. Health maintenance: Patient would like to have the FIT kit for colon cancer screening. 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  losartan-hydroCHLOROthiazide (HYZAAR) 100-12.5 mg per tablet Take 1 Tab by mouth daily.  cyanocobalamin (VITAMIN B-12) 1,000 mcg tablet Take 1,000 mcg by mouth daily.  multivitamin (ONE A DAY) tablet Take 1 Tab by mouth daily. Allergies  Allergies   Allergen Reactions    Penicillins Other (comments)       Family History  Family History   Problem Relation Age of Onset    Cancer Mother     Hypertension Mother     Heart Disease Father     Diabetes Brother        Social History  Social History     Social History    Marital status:      Spouse name: N/A    Number of children: N/A    Years of education: N/A     Occupational History    Not on file. Social History Main Topics    Smoking status: Never Smoker    Smokeless tobacco: Never Used    Alcohol use No      Comment: Occasionally    Drug use: No    Sexual activity: Yes     Other Topics Concern    Not on file     Social History Narrative       Review of Systems  Review of Systems - Negative except as noted above in HPI.     Vital Signs  Visit Vitals    /86 (BP 1 Location: Left arm, BP Patient Position: Sitting)    Pulse 68    Temp 97.3 °F (36.3 °C) (Oral)    Resp 18    Ht 6' (1.829 m)    Wt 223 lb (101.2 kg)    SpO2 96%    BMI 30.24 kg/m2         Physical Exam  Physical Examination: General appearance - alert, well appearing, and in no distress, oriented to person, place, and time and acyanotic, in no respiratory distress  Mental status - alert, oriented to person, place, and time  Mouth - mucous membranes moist, pharynx normal without lesions  Neck - supple, no significant adenopathy  Lymphatics - no palpable lymphadenopathy, no hepatosplenomegaly  Chest - clear to auscultation, no wheezes, rales or rhonchi, symmetric air entry  Heart - normal rate, regular rhythm, normal S1, S2, no murmurs, rubs, clicks or gallops  Neurological - alert, oriented, normal speech, no focal findings or movement disorder noted  Musculoskeletal - discomfort 2nd metacarpal to palpation, no crepitus, redness or erythema. Extremities - no pedal edema noted, intact peripheral pulses    Results  Results for orders placed or performed in visit on 05/29/18   AMB POC URINALYSIS DIP STICK AUTO W/O MICRO   Result Value Ref Range    Color (UA POC) Yellow     Clarity (UA POC) Clear     Glucose (UA POC) Negative Negative    Bilirubin (UA POC) Negative Negative    Ketones (UA POC) Negative Negative    Specific gravity (UA POC) 1.020 1.001 - 1.035    Blood (UA POC) Trace Negative    pH (UA POC) 7.0 4.6 - 8.0    Protein (UA POC) Negative Negative    Urobilinogen (UA POC) 0.2 mg/dL 0.2 - 1    Nitrites (UA POC) Negative Negative    Leukocyte esterase (UA POC) 1+ Negative       ASSESSMENT and PLAN    ICD-10-CM ICD-9-CM    1. Essential hypertension I10 401.9 CBC W/O DIFF      METABOLIC PANEL, BASIC      COLLECTION VENOUS BLOOD,VENIPUNCTURE   2. Neurogenic bladder N31.9 596.54 AMB POC URINALYSIS DIP STICK AUTO W/O MICRO      COLLECTION VENOUS BLOOD,VENIPUNCTURE   3.  Hypercholesteremia E78.00 272.0 LIPID PANEL      COLLECTION VENOUS BLOOD,VENIPUNCTURE   4. Screen for colon cancer Z12.11 V76.51 OCCULT BLOOD, IMMUNOASSAY (FIT)   5. Injury of left hand, initial encounter S69. 92XA 959.4 XR HAND LT MIN 3 V     lab results and schedule of future lab studies reviewed with patient  reviewed diet, exercise and weight control  cardiovascular risk and specific lipid/LDL goals reviewed  reviewed medications and side effects in detail  radiology results and schedule of future radiology studies reviewed with patient    I have discussed the diagnosis with the patient and the intended plan of care as seen in the above orders. The patient has received an after-visit summary and questions were answered concerning future plans. I have discussed medication, side effects, and warnings with the patient in detail. The patient verbalized understanding and is in agreement with the plan of care. The patient will contact the office with any additional concerns.     Fahad Horne MD

## 2018-05-29 NOTE — MR AVS SNAPSHOT
[facility] 200 [facility] 
[phone] Patient: Salena Benitez MRN: [id number] NPQ:6/21/8059 Visit Information Date & Time Provider Department Dept. Phone Encounter #  
 5/29/2018  8:30 AM Roscoeed Gina Rao MD [facility] [phone] [id number] Follow-up Instructions Return in about 3 months (around 8/29/2018), or if symptoms worsen or fail to improve, for htn, dyslipidemia. 9/13/2018  9:45 AM  
Any with Debi Colon MD  
[facility] Appt Note: EP- BPH, Bladder Stone, UTI, 8 month follow up  
 [address] [facility] [address] [place] [address]  
  
   
 [address] [address] Upcoming Health Maintenance Date Due FOBT Q 1 YEAR AGE 50-75 3/26/2005 Influenza Age 5 to Adult 8/1/2018 Pneumococcal 19-64 Highest Risk (2 of 3 - PCV13) 8/4/2018 DTaP/Tdap/Td series (2 - Td) 2/12/2028 Allergies as of 5/29/2018  Review Complete On: 5/29/2018 By: Apolinar Almazan MD  
  
 Severity Noted Reaction Type Reactions Penicillins  04/20/2012    Other (comments) Current Immunizations  Never Reviewed No immunizations on file. Not reviewed this visit You Were Diagnosed With   
  
 Codes Comments Essential hypertension    -  Primary ICD-10-CM: I10 
ICD-9-CM: 401.9 Neurogenic bladder     ICD-10-CM: N31.9 ICD-9-CM: 596.54 Hypercholesteremia     ICD-10-CM: E78.00 ICD-9-CM: 272.0 Screen for colon cancer     ICD-10-CM: Z12.11 ICD-9-CM: V76.51 Injury of left hand, initial encounter     ICD-10-CM: Q58.54TW ICD-9-CM: 131. 4 Vitals BP Pulse Temp Resp Height(growth percentile) Weight(growth percentile) 144/86 (BP 1 Location: Left arm, BP Patient Position: Sitting) 68 97.3 °F (36.3 °C) (Oral) 18 6' (1.829 m) 223 lb (101.2 kg) SpO2 BMI Smoking Status 96% 30.24 kg/m2 Never Smoker BMI and BSA Data Body Mass Index Body Surface Area  
 30.24 kg/m 2 2.27 m 2 Preferred Pharmacy Pharmacy Name Phone [facility], [address] [phone] Your Updated Medication List  
  
   
This list is accurate as of 5/29/18  9:00 AM.  Always use your most recent med list.  
  
  
  
  
 losartan-hydroCHLOROthiazide 100-12.5 mg per tablet Commonly known as:  HYZAAR Take 1 Tab by mouth daily. multivitamin tablet Commonly known as:  ONE A DAY Take 1 Tab by mouth daily. VITAMIN B-12 1,000 mcg tablet Generic drug:  cyanocobalamin Take 1,000 mcg by mouth daily. VITAMIN C 250 mg tablet Generic drug:  ascorbic acid (vitamin C) Take  by mouth. We Performed the Following AMB POC URINALYSIS DIP STICK AUTO W/O MICRO [23066 CPT(R)] Follow-up Instructions Return in about 3 months (around 8/29/2018), or if symptoms worsen or fail to improve, for htn, dyslipidemia. To-Do List   
 05/29/2018 Lab:  CBC W/O DIFF   
  
 05/29/2018 Lab:  LIPID PANEL   
  
 05/29/2018 Lab:  METABOLIC PANEL, BASIC   
  
 05/29/2018 Imaging:  XR HAND LT MIN 3 V   
  
 06/28/2018 Lab:  OCCULT BLOOD, IMMUNOASSAY (FIT) Introducing \[facility] [place] [facility]\"" & HEALTH SERVICES! Dear Felicia Houser: Thank you for requesting a ImmunoPhotonics account. Our records indicate that you already have an active ImmunoPhotonics account. You can access your account anytime at [email or web address] Did you know that you can access your hospital and ER discharge instructions at any time in ImmunoPhotonics? You can also review all of your test results from your hospital stay or ER visit. Additional Information If you have questions, please visit the Frequently Asked Questions section of the APERA BAGS website at [email or web address] Remember, APERA BAGS is NOT to be used for urgent needs. For medical emergencies, dial 911. Now available from your iPhone and Android! Please provide this summary of care documentation to your next provider. Your primary care clinician is listed as Nat Sellers. If you have any questions after today's visit, please call [phone].

## 2018-05-30 ENCOUNTER — HOSPITAL ENCOUNTER (OUTPATIENT)
Dept: LAB | Age: 63
Discharge: HOME | End: 2018-05-30
Payer: COMMERCIAL

## 2018-05-30 DIAGNOSIS — Z12.11 SCREEN FOR COLON CANCER: ICD-10-CM

## 2018-05-30 DIAGNOSIS — D75.1 POLYCYTHEMIA: Primary | ICD-10-CM

## 2018-05-30 LAB
ANION GAP SERPL CALC-SCNC: 6 MMOL/L (ref 3–18)
BUN SERPL-MCNC: 15 MG/DL (ref 7–18)
BUN/CREAT SERPL: 15 (ref 12–20)
CALCIUM SERPL-MCNC: 8.8 MG/DL (ref 8.5–10.1)
CHLORIDE SERPL-SCNC: 105 MMOL/L (ref 100–108)
CHOLEST SERPL-MCNC: 192 MG/DL
CO2 SERPL-SCNC: 30 MMOL/L (ref 21–32)
CREAT SERPL-MCNC: 1.01 MG/DL (ref 0.6–1.3)
GLUCOSE SERPL-MCNC: 91 MG/DL (ref 74–99)
HDLC SERPL-MCNC: 41 MG/DL (ref 40–60)
HDLC SERPL: 4.7 {RATIO} (ref 0–5)
LDLC SERPL CALC-MCNC: 129 MG/DL (ref 0–100)
LIPID PROFILE,FLP: ABNORMAL
POTASSIUM SERPL-SCNC: 3.9 MMOL/L (ref 3.5–5.5)
SODIUM SERPL-SCNC: 141 MMOL/L (ref 136–145)
TRIGL SERPL-MCNC: 110 MG/DL (ref ?–150)
VLDLC SERPL CALC-MCNC: 22 MG/DL

## 2018-05-30 PROCEDURE — 82274 ASSAY TEST FOR BLOOD FECAL: CPT | Performed by: FAMILY MEDICINE

## 2018-05-30 NOTE — PROGRESS NOTES
Please let patient know that his cholesterol level is elevated. He should continue taking the cholesterol medication. We will recheck in 6 months. If still elevated we may need to go up on the dosage of medication. He should continue with exercise and take diet low in polysaturated fats. Patient also has an elevated hemoglobin. Last time we checked his blood count his hemoglobin was also elevated. Since it has not come down I will refer him to the hematologist for evaluation and advice on this.   Filipe Miller MD

## 2018-05-30 NOTE — PROGRESS NOTES
Patient is not on any cholesterol medication. I would recommend taking a cholesterol-lowering medication. If he is apprehensive about this then we can recheck labs at next visit. But as noted above he should continue with lifestyle and dietary modification and take a diet low in polysaturated fats.   Will Ellis MD

## 2018-06-01 NOTE — PROGRESS NOTES
Spoke with patient at this time. Informed patient of results at this time. Patient states he will diet and exercise at this time. Patient voiced no concerns at this time.

## 2018-06-05 LAB — HEMOCCULT STL QL IA: NEGATIVE

## 2018-07-18 ENCOUNTER — HOSPITAL ENCOUNTER (OUTPATIENT)
Dept: ONCOLOGY | Age: 63
Discharge: HOME | End: 2018-07-18

## 2018-07-18 ENCOUNTER — OFFICE VISIT (OUTPATIENT)
Dept: ONCOLOGY | Age: 63
End: 2018-07-18

## 2018-07-18 VITALS
BODY MASS INDEX: 30.38 KG/M2 | HEART RATE: 65 BPM | SYSTOLIC BLOOD PRESSURE: 159 MMHG | DIASTOLIC BLOOD PRESSURE: 93 MMHG | WEIGHT: 224 LBS | TEMPERATURE: 98.2 F

## 2018-07-18 DIAGNOSIS — D75.1 POLYCYTHEMIA: ICD-10-CM

## 2018-07-18 DIAGNOSIS — D75.1 ERYTHROCYTOSIS: Primary | ICD-10-CM

## 2018-07-18 DIAGNOSIS — D75.1 ERYTHROCYTOSIS: ICD-10-CM

## 2018-07-18 LAB
BASO+EOS+MONOS # BLD AUTO: 0.8 K/UL (ref 0–2.3)
BASO+EOS+MONOS # BLD AUTO: 7 % (ref 0.1–17)
DIFFERENTIAL METHOD BLD: ABNORMAL
ERYTHROCYTE [DISTWIDTH] IN BLOOD BY AUTOMATED COUNT: 13.5 % (ref 11.5–14.5)
HCT VFR BLD AUTO: 51.3 % (ref 36–48)
HGB BLD-MCNC: 17.3 G/DL (ref 12–16)
LYMPHOCYTES # BLD: 1.8 K/UL (ref 1.1–5.9)
LYMPHOCYTES NFR BLD: 17 % (ref 14–44)
MCH RBC QN AUTO: 30.2 PG (ref 25–35)
MCHC RBC AUTO-ENTMCNC: 33.7 G/DL (ref 31–37)
MCV RBC AUTO: 89.5 FL (ref 78–102)
NEUTS SEG # BLD: 7.6 K/UL (ref 1.8–9.5)
NEUTS SEG NFR BLD: 75 % (ref 40–70)
PLATELET # BLD AUTO: 162 K/UL (ref 140–440)
RBC # BLD AUTO: 5.73 M/UL (ref 4.1–5.1)
WBC # BLD AUTO: 10.2 K/UL (ref 4.5–13)

## 2018-07-18 NOTE — MR AVS SNAPSHOT
Montserrat Epstein 
 
 
 [facility] [place] [address] [address] [facility] 
[phone] Patient: Roxana Mc MRN: [id number] CEE:6/74/[birth year]176 Visit Information Date & Time Provider Department Dept. Phone Encounter #  
 7/18/2018 11:00 AM MD Ammon Weeks [facility] Medical Oncology [phone] [id number] Follow-up Instructions Return in about 2 weeks (around 8/1/2018). Your Appointments 7/30/2018  9:45 AM  
Office Visit with Ab Gardner MD  
[address]  Oncology [facility] Appt Note: 2 week results [address], [place] [address] [address] [facility], [address] [place] [address] 200 [facility] 9/13/2018  9:45 AM  
Any with Vince Montelongo MD  
[facility] ([facility]) Appt Note: EP- BPH, Bladder Stone, UTI, 8 month follow up  
 [address] [facility] [address] [address] [address]  
  
   
 [address] [facility] 2301 [facility] 100 200 [facility] Upcoming Health Maintenance Date Due Influenza Age 5 to Adult 8/1/2018 Pneumococcal 19-64 Highest Risk (2 of 3 - PCV13) 8/4/2018 FOBT Q 1 YEAR AGE 50-75 5/30/2019 DTaP/Tdap/Td series (2 - Td) 2/12/2028 Allergies as of 7/18/2018  Review Complete On: 7/18/2018 By: Shayan Wellington Severity Noted Reaction Type Reactions Penicillins  04/20/2012    Other (comments) Scallops  07/18/2018    Other (comments) Current Immunizations  Never Reviewed No immunizations on file. Not reviewed this visit You Were Diagnosed With   
  
 Codes Comments Erythrocytosis    -  Primary ICD-10-CM: D75.1 ICD-9-CM: 289.0 Polycythemia     ICD-10-CM: D75.1 ICD-9-CM: 238.4 Vitals BP Pulse Temp Weight(growth percentile) BMI Smoking Status (!) 159/93 65 98.2 °F (36.8 °C) 224 lb (101.6 kg) 30.38 kg/m2 Never Smoker BMI and BSA Data Body Mass Index Body Surface Area  
 30.38 kg/m 2 2.27 m 2 Preferred Pharmacy Pharmacy Name Phone [facility], [address] [phone] Your Updated Medication List  
  
   
This list is accurate as of 7/18/18 11:45 AM.  Always use your most recent med list.  
  
  
  
  
 losartan-hydroCHLOROthiazide 100-12.5 mg per tablet Commonly known as:  HYZAAR Take 1 Tab by mouth daily. multivitamin tablet Commonly known as:  ONE A DAY Take 1 Tab by mouth daily. VITAMIN B-12 1,000 mcg tablet Generic drug:  cyanocobalamin Take 1,000 mcg by mouth daily. VITAMIN C 250 mg tablet Generic drug:  ascorbic acid (vitamin C) Take  by mouth. We Performed the Following COMPLETE CBC & AUTO DIFF WBC [50562 CPT(R)] Follow-up Instructions Return in about 2 weeks (around 8/1/2018). To-Do List   
 07/18/2018 Lab:  CBC WITH 3 PART DIFF   
  
 07/18/2018 Lab:  FERRITIN   
  
 07/18/2018 Lab:  IRON PROFILE   
  
 07/18/2018 Lab:  JAK2 MUTATION ANALYSIS   
  
 07/18/2018 Lab:  METABOLIC PANEL, COMPREHENSIVE [facility] & HEALTH SERVICES! Dear Praveen Leyva: Thank you for requesting a Accentium Web account. Our records indicate that you already have an active Accentium Web account. You can access your account anytime at [email or web address] Did you know that you can access your hospital and ER discharge instructions at any time in Accentium Web? You can also review all of your test results from your hospital stay or ER visit. Additional Information If you have questions, please visit the Frequently Asked Questions section of the Accentium Web website at [email or web address] Remember, Accentium Web is NOT to be used for urgent needs.  For medical emergencies, dial 911. Now available from your iPhone and Android! Please provide this summary of care documentation to your next provider. Your primary care clinician is listed as Nat Sellers. If you have any questions after today's visit, please call [phone].

## 2018-07-18 NOTE — PROGRESS NOTES
Hematology/Oncology Consultation Note    Name: Juliano Al  Date: 2018  : 1955    PCP: Fei Belcher MD       Mr. Valarie Landaverde  is a 61 y.o. man who is referred for evaluation of unexplained erythrocytosis/polycythemia    Subjective:   Chief complaint: Elevated hemoglobin hematocrit    History of present illness:  Mr. Valarie Landaverde is a 71-year-old man who states that he suffers from a neurogenic bladder. Recently, on 2018 a routine CBC revealed an elevated hemoglobin of 16.9 g/dL with hematocrit of 51.4%. His RBC count was borderline at 5.49. He had a normal platelet count of 858,878. His comprehensive metabolic panel revealed all normal values. The patient reports that he does not use tobacco or alcohol products. Therefore, he is here to determine whether or not he has an underlying myeloproliferative disorder versus a transient erythrocytosis. He denies having any history of an antecedent hematologic disorder. There is no family history of leukemia or lymphoma. The patient complains of occasional fatigue and weakness but otherwise has no additional complaints or concerns.     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     Nephrolithiasis 2012    Recurrent UTI     Sleep apnea     Urinary retention 2012       Allergies   Allergen Reactions    Penicillins Other (comments)    Scallops Other (comments)       Past Surgical History:   Procedure Laterality Date    BIOPSY PROSTATE  2002    HX LAP CHOLECYSTECTOMY  2013    HX UROLOGICAL  10/11/2017    S/p cysto, PVP (ProTouch laser), Cystolitholapaxy     CT COLONOSCOPY FLX DX W/COLLJ SPEC WHEN PFRMD         Social History     Social History    Marital status:      Spouse name: N/A    Number of children: N/A    Years of education: N/A     Occupational History  Not on file. Social History Main Topics    Smoking status: Never Smoker    Smokeless tobacco: Never Used    Alcohol use No      Comment: Occasionally    Drug use: No    Sexual activity: Yes     Other Topics Concern    Not on file     Social History Narrative       Family History   Problem Relation Age of Onset    Cancer Mother     Hypertension Mother     Heart Disease Father     Diabetes Brother        Current Outpatient Prescriptions   Medication Sig Dispense Refill    ascorbic acid, vitamin C, (VITAMIN C) 250 mg tablet Take  by mouth.  losartan-hydroCHLOROthiazide (HYZAAR) 100-12.5 mg per tablet Take 1 Tab by mouth daily.  cyanocobalamin (VITAMIN B-12) 1,000 mcg tablet Take 1,000 mcg by mouth daily.  multivitamin (ONE A DAY) tablet Take 1 Tab by mouth daily. Review of Systems    General ROS:The patient has occasional complaints of fatigue and weakness. Psychological ROS: patient denies having any psychological symptoms such as hallucinations, depression or anxiety. Ophthalmic ROS:the patient denies having any visual impairment or eye discomfort. ENT ROS: there are no abnormalities reported. Allergy and Immunology ROS:the patient denies having any seasonal allergies or allergies to medications other than those already outlined above. Hematological and Lymphatic ROS: the patient denies having any bruising, bleeding or lymphadenopathy. Endocrine ROS: the patient denies having any heat or cold intolerance. There is no history of diabetes or thyroid disorders. Breast ROS: the patient denies having any history of breast mass, nipple discharge, or lumps. Respiratory ROS:the patient denies having any cough, shortness of breath, or dyspnea on exertion. Cardiovascular ROS: there are no complaints of chest pain, palpitations, chest pounding, or dyspnea on exertion.   Gastrointestinal ROS: the patient denies having nausea, emesis, diarrhea, constipation, or blood in the stool.  Genito-Urinary ROS: the patient denies having urinary urgency, frequency, or dysuria. Musculoskeletal ROS: with the exception of mild arthralgias the patient has no other musculoskeletal complaints. Neurological ROS: the patient denies having any numbness, tingling, or neurologic deficits. Dermatological ROS:patient denies having any unexplained rash, skin ulcerations, or hives. Objective:     Visit Vitals    BP (!) 159/93    Pulse 65    Temp 98.2 °F (36.8 °C)    Wt 101.6 kg (224 lb)    BMI 30.38 kg/m2        ECOGPS=0    Physical Exam:   Gen. Appearance: the patient is in no acute distress. Skin: There is no evidence of bruise or rash. HEENT: The head is normocephalic and atraumatic. The conjunctiva and sclera are clear. Pupils are equal, round, reactive to light, and accommodation. The extraocular movements are intact. ENT reveals no oral mucosal lesions or ulcerations. Neck: Supple without lymphadenopathy or thyromegaly. Lungs: Clear to auscultation and percussion; there are no wheezes or rhonchi. Heart: Regular rate and rhythm; there are no murmurs, gallops, or rubs. Abdomen: Bowel sounds are present and normal.  There is no guarding, tenderness, or hepatosplenomegaly. Extremities: There is no clubbing, cyanosis, or edema. Neurologic: There are no focal neurologic deficits. Lymphatics: There is no palpable peripheral lymphadenopathy. Lab data:  Lab data from 5/29/2018 shows WBC count 9.2, hemoglobin 16.9 g/dL, hematocrit 51.4%, and platelet count was 230,076. The comprehensive metabolic panel from 2/01/2314 shows all normal values. Assessment:   Erythrocytosis/polycythemia: I have explained to the patient that his current hemoglobin is 16.9 g/dL and hematocrit of 51.4%. The diagnostic differential includes an evolving myeloproliferative disorder such as primary polycythemia.     Plan:   Erythrocytosis/polycythemia: At this time I will order a comprehensive metabolic panel, iron profile, ferritin level, and a JAK2 mutation analysis. I will have the patient return to the clinic in 2 weeks to review test results and to discuss potential options of management. Follow-up in 2 weeks    Orders Placed This Encounter    COMPLETE CBC & AUTO DIFF WBC    COMPLETE CBC & AUTO DIFF WBC    InHouse CBC (Sunquest)     Standing Status:   Future     Number of Occurrences:   1     Standing Expiration Date:   0/17/4765    METABOLIC PANEL, COMPREHENSIVE     Standing Status:   Future     Number of Occurrences:   1     Standing Expiration Date:   7/19/2019    IRON PROFILE     Standing Status:   Future     Number of Occurrences:   1     Standing Expiration Date:   7/19/2019    FERRITIN     Standing Status:   Future     Number of Occurrences:   1     Standing Expiration Date:   7/19/2019    JAK2 MUTATION ANALYSIS     Standing Status:   Future     Number of Occurrences:   1     Standing Expiration Date:   7/19/2019    InHouse CBC (Sunquest)     Standing Status:   Future     Number of Occurrences:   1     Standing Expiration Date:   7/25/2018           Dustin Santos MD  7/18/2018      Please note: This document has been produced using voice recognition software. Unrecognized errors in transcription may be present.

## 2018-07-19 RX ORDER — LOSARTAN POTASSIUM AND HYDROCHLOROTHIAZIDE 12.5; 1 MG/1; MG/1
1 TABLET ORAL DAILY
Qty: 90 TAB | Refills: 1 | Status: SHIPPED | OUTPATIENT
Start: 2018-07-19 | End: 2019-03-06 | Stop reason: ALTCHOICE

## 2018-07-30 ENCOUNTER — OFFICE VISIT (OUTPATIENT)
Dept: ONCOLOGY | Age: 63
End: 2018-07-30

## 2018-07-30 VITALS
RESPIRATION RATE: 18 BRPM | DIASTOLIC BLOOD PRESSURE: 90 MMHG | TEMPERATURE: 98.5 F | HEART RATE: 76 BPM | SYSTOLIC BLOOD PRESSURE: 170 MMHG | BODY MASS INDEX: 30.79 KG/M2 | WEIGHT: 227 LBS

## 2018-07-30 DIAGNOSIS — N31.9 NEUROGENIC BLADDER: Primary | ICD-10-CM

## 2018-07-30 LAB
ALBUMIN SERPL-MCNC: 4.6 G/DL (ref 3.6–4.8)
ALBUMIN/GLOB SERPL: 1.9 {RATIO} (ref 1.2–2.2)
ALP SERPL-CCNC: 69 IU/L (ref 39–117)
ALT SERPL-CCNC: 20 IU/L (ref 0–44)
AST SERPL-CCNC: 23 IU/L (ref 0–40)
BACKGROUND: 480503: NORMAL
BILIRUB SERPL-MCNC: 0.8 MG/DL (ref 0–1.2)
BUN SERPL-MCNC: 16 MG/DL (ref 8–27)
BUN/CREAT SERPL: 16 (ref 10–24)
CALCIUM SERPL-MCNC: 9.6 MG/DL (ref 8.6–10.2)
CHLORIDE SERPL-SCNC: 99 MMOL/L (ref 96–106)
CO2 SERPL-SCNC: 24 MMOL/L (ref 20–29)
CREAT SERPL-MCNC: 1.03 MG/DL (ref 0.76–1.27)
FERRITIN SERPL-MCNC: 178 NG/ML (ref 30–400)
GLOBULIN SER CALC-MCNC: 2.4 G/DL (ref 1.5–4.5)
GLUCOSE SERPL-MCNC: 101 MG/DL (ref 65–99)
IRON SATN MFR SERPL: 32 % (ref 15–55)
IRON SERPL-MCNC: 86 UG/DL (ref 38–169)
JAK2 P.V617F BLD/T QL: NORMAL
LAB DIRECTOR NAME PROVIDER: NORMAL
POTASSIUM SERPL-SCNC: 4.1 MMOL/L (ref 3.5–5.2)
PROT SERPL-MCNC: 7 G/DL (ref 6–8.5)
SODIUM SERPL-SCNC: 141 MMOL/L (ref 134–144)
TIBC SERPL-MCNC: 270 UG/DL (ref 250–450)
UIBC SERPL-MCNC: 184 UG/DL (ref 111–343)

## 2018-07-30 RX ORDER — NITROFURANTOIN 25; 75 MG/1; MG/1
100 CAPSULE ORAL 2 TIMES DAILY
Qty: 14 CAP | Refills: 0 | Status: SHIPPED | OUTPATIENT
Start: 2018-07-30 | End: 2018-09-27

## 2018-07-30 NOTE — MR AVS SNAPSHOT
[facility] [address] [place] [address] 200 [facility] 
[phone] Patient: Cory Khoury MRN: [id number] FZQ:8/55/5828 Visit Information Date & Time Provider Department Dept. Phone Encounter #  
 7/30/2018  9:45 AM Merlin Grandchild, MD [facility] Oncology [phone] [id number] Your Appointments 8/6/2018  9:45 AM  
Office Visit with Merlin Grandchild, MD  
[address] Oncology [facility]) Appt Note: Results Review [address] 582 [facility] 3200 [facility], [address] [place] [address] 200 [facility] 9/13/2018  9:45 AM  
Any with Sunnie Kussmaul, MD  
[facility] ([facility]) Appt Note: EP- BPH, Bladder Stone, UTI, 8 month follow up  
 [address] [facility] [address] Halina Lombard 1097 [facility]  
  
   
 2057 [facility] 2301 [facility] 100 200 [facility] Upcoming Health Maintenance Date Due Influenza Age 5 to Adult 8/1/2018 Pneumococcal 19-64 Highest Risk (2 of 3 - PCV13) 8/4/2018 FOBT Q 1 YEAR AGE 50-75 5/30/2019 DTaP/Tdap/Td series (2 - Td) 2/12/2028 Allergies as of 7/30/2018  Review Complete On: 7/30/2018 By: Merlin Grandchild, MD  
  
 Severity Noted Reaction Type Reactions Penicillins  04/20/2012    Other (comments) Scallops  07/18/2018    Other (comments) Current Immunizations  Never Reviewed No immunizations on file. Not reviewed this visit Vitals BP Pulse Temp Resp Weight(growth percentile) BMI  
 170/90 (BP 1 Location: Right arm, BP Patient Position: Sitting) 76 98.5 °F (36.9 °C) (Oral) 18 227 lb (103 kg) 30.79 kg/m2 Smoking Status Never Smoker BMI and BSA Data Body Mass Index Body Surface Area 30.79 kg/m 2 2.29 m 2 Preferred Pharmacy Pharmacy Name Phone [facility], [address] [place] [address] [phone] Your Updated Medication List  
  
   
This list is accurate as of 7/30/18 10:24 AM.  Always use your most recent med list.  
  
  
  
  
 losartan-hydroCHLOROthiazide 100-12.5 mg per tablet Commonly known as:  HYZAAR Take 1 Tab by mouth daily. multivitamin tablet Commonly known as:  ONE A DAY Take 1 Tab by mouth daily. nitrofurantoin (macrocrystal-monohydrate) 100 mg capsule Commonly known as:  MACROBID Take 1 Cap by mouth two (2) times a day. VITAMIN B-12 1,000 mcg tablet Generic drug:  cyanocobalamin Take 1,000 mcg by mouth daily. VITAMIN C 250 mg tablet Generic drug:  ascorbic acid (vitamin C) Take  by mouth. Introducing [facility] & HEALTH SERVICES! Dear Trixie Nichole: Thank you for requesting a Yammer account. Our records indicate that you already have an active Yammer account. You can access your account anytime at [email or web address], [email or web address]. [email or web address]/"[email or web address]." Did you know that you can access your hospital and ER discharge instructions at any time in Yammer? You can also review all of your test results from your hospital stay or ER visit. Additional Information If you have questions, please visit the Frequently Asked Questions section of the Yammer website at [email or web address], [email or web address]. OnMyBlock/"[email or web address], [email or web address]."/. Remember, Yammer is NOT to be used for urgent needs. For medical emergencies, dial 911. Now available from your iPhone and Android! Please provide this summary of care documentation to your next provider. Your primary care clinician is listed as Nat Sellers. If you have any questions after today's visit, please call [phone].

## 2018-08-06 ENCOUNTER — OFFICE VISIT (OUTPATIENT)
Dept: ONCOLOGY | Age: 63
End: 2018-08-06

## 2018-08-06 VITALS
SYSTOLIC BLOOD PRESSURE: 172 MMHG | TEMPERATURE: 98.3 F | RESPIRATION RATE: 16 BRPM | HEART RATE: 82 BPM | WEIGHT: 227.6 LBS | DIASTOLIC BLOOD PRESSURE: 95 MMHG | BODY MASS INDEX: 30.87 KG/M2

## 2018-08-06 DIAGNOSIS — D75.1 POLYCYTHEMIA: ICD-10-CM

## 2018-08-06 DIAGNOSIS — D75.1 ERYTHROCYTOSIS: Primary | ICD-10-CM

## 2018-08-06 NOTE — PATIENT INSTRUCTIONS
Polycythemia: Care Instructions  Your Care Instructions    Polycythemia (say \"paw-leonor-sy-THEE-jane-uh) is an abnormal increase in red blood cells. It happens when the tissue inside your bones (bone marrow) makes too much blood. It also can occur if your blood does not have enough liquid, or plasma. This can make the number of red blood cells seem higher than normal. The extra red blood cells make your blood thicker than normal. This may raise your risk for blood clots that can cause heart attacks or strokes. Clots can form in the deep veins of the body, a condition called deep vein thrombosis. Or, a clot can travel through the blood to a lung (a pulmonary embolism). Your doctor may treat you by taking out some of your blood (phlebotomy). The process is like donating blood. Your doctor may even recommend that you donate blood. You may take pills to stop your body from making red blood cells. You also will get treatment for any other conditions that may cause your body to make too many red blood cells. Follow-up care is a key part of your treatment and safety. Be sure to make and go to all appointments, and call your doctor if you are having problems. It's also a good idea to know your test results and keep a list of the medicines you take. How can you care for yourself at home? · Be safe with medicines. Take your medicines exactly as prescribed. Call your doctor if you think you are having a problem with your medicine. · Drink plenty of fluids, enough so that your urine is light yellow or clear like water, before and after you have blood removed. If you have kidney, heart, or liver disease and have to limit fluids, talk with your doctor before you increase the amount of fluids you drink. · Take it easy after you have had blood removed. Do not do vigorous exercise. · If your doctor recommends aspirin, take it exactly as prescribed.  Call your doctor if you think you are having a problem with your medicine. · Do not smoke. Smoking increases the risk of blood clots and may reduce the amount of oxygen in your blood. If you need help quitting, talk to your doctor about stop-smoking programs and medicines. These can increase your chances of quitting for good. · Take an antihistamine, such as a nondrowsy one like loratadine (Claritin) or one that might make you sleepy like diphenhydramine (Benadryl), if your skin is itchy. Some people who have this condition have itching. · Wear medical alert jewelry that lists your clotting problem. You can buy this at most drugstores. When should you call for help? Call 911 anytime you think you may need emergency care. For example, call if:    · You have sudden chest pain and shortness of breath, or you cough up blood.     · You have symptoms of a stroke. These may include:  ¨ Sudden numbness, tingling, weakness, or loss of movement in your face, arm, or leg, especially on only one side of your body. ¨ Sudden vision changes. ¨ Sudden trouble speaking. ¨ Sudden confusion or trouble understanding simple statements. ¨ Sudden problems with walking or balance. ¨ A sudden, severe headache that is different from past headaches.     · You have symptoms of a heart attack. These may include:  ¨ Chest pain or pressure, or a strange feeling in the chest.  ¨ Sweating. ¨ Shortness of breath. ¨ Nausea or vomiting. ¨ Pain, pressure, or a strange feeling in the back, neck, jaw, or upper belly or in one or both shoulders or arms. ¨ Lightheadedness or sudden weakness. ¨ A fast or irregular heartbeat. After you call 911, the  may tell you to chew 1 adult-strength or 2 to 4 low-dose aspirin. Wait for an ambulance. Do not try to drive yourself.    Call your doctor now or seek immediate medical care if:    · You have signs of a blood clot, such as:  ¨ Pain in your calf, back of knee, thigh, or groin.   ¨ Redness and swelling in your leg or groin.    Watch closely for changes in your health, and be sure to contact your doctor if you have any problems. Where can you learn more? Go to http://amaya-pepito.info/. Enter O395 in the search box to learn more about \"Polycythemia: Care Instructions. \"  Current as of: October 9, 2017  Content Version: 11.7  © 5222-3094 [employer]. Care instructions adapted under license by [employer] (which disclaims liability or warranty for this information). If you have questions about a medical condition or this instruction, always ask your healthcare professional. [address] any warranty or liability for your use of this information.

## 2018-08-06 NOTE — PROGRESS NOTES
Hematology/medical oncology progress note    8/6/2018  Rosa Maria Ruff  YOB: 1955    PCP: Dr. Francesca Good    Diagnosis: Erythrocytosis/polycythemia    Mr. Reza Lopez is a 30-year-old man who is referred for evaluation of unexplained erythrocytosis. I have informed the patient that the CBC from 7/18/2018 showed a WBC count 10.2, hemoglobin 17.3 g/dL, hematocrit 51.3%, and the platelet count was 370,445. Iron studies revealed that he had an iron level of 86 mcg/dL, and iron saturation 32%, and ferritin level of 178 ng/mL. His metabolic panel revealed normal values except for slightly elevated glucose of 101 mg/dL. The DNA analysis for mutations in the JA K2 was negative. I have therefore explained to the patient that he has erythrocytosis with hematocrit exceeds 50%. Therefore therapeutic intervention with phlebotomy is indicated weekly until hematocrit is less than or equal to 42%. This will decrease his chances for venous thromboembolism, CVA, and myocardial infarction. The patient was encouraged to discontinue the use of all forms of tobacco products and to remain well-hydrated as much as possible. He had his questions answered to his satisfaction. Total time 30 greater than 50% of the time was in counseling and coordination of care. eDvonte Ma MD, [address]

## 2018-10-09 ENCOUNTER — TELEPHONE (OUTPATIENT)
Dept: FAMILY MEDICINE CLINIC | Age: 63
End: 2018-10-09

## 2018-10-10 NOTE — PROGRESS NOTES
This is an addendum to the chart note of Mr. Akbar Salmeron. 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 This was a mistake and patient does not have any history of malignant neoplasms of his oral cavity. My physical exam indicates that his oral mucosa was moist and no lesions were noted. This entry will be removed from his chart as he does not have any past medical history of this.   Estuardo Ivy MD

## 2018-10-10 NOTE — TELEPHONE ENCOUNTER
I called patient and discussed this with him. I have made an addendum to his note. I have written a letter. Please call him to come and  this letter.   Nirali Murillo MD
Patient called stating his wife is trying to get life insurance on him. The insurance company found something in his note about a tumor in his mouth. (2/12/18)  He says this was never talked about. He needs a letter stating this was a mistake. I explained that patient should receive a call back within 24-48 hours.
risk factors

## 2018-10-15 ENCOUNTER — OFFICE VISIT (OUTPATIENT)
Dept: FAMILY MEDICINE CLINIC | Age: 63
End: 2018-10-15

## 2018-10-15 VITALS
HEART RATE: 70 BPM | HEIGHT: 72 IN | TEMPERATURE: 96.2 F | SYSTOLIC BLOOD PRESSURE: 143 MMHG | BODY MASS INDEX: 30.2 KG/M2 | DIASTOLIC BLOOD PRESSURE: 82 MMHG | RESPIRATION RATE: 20 BRPM | WEIGHT: 223 LBS | OXYGEN SATURATION: 95 %

## 2018-10-15 DIAGNOSIS — I10 ESSENTIAL HYPERTENSION: ICD-10-CM

## 2018-10-15 DIAGNOSIS — N31.9 NEUROGENIC BLADDER: ICD-10-CM

## 2018-10-15 DIAGNOSIS — R10.30 LOWER ABDOMINAL PAIN: ICD-10-CM

## 2018-10-15 DIAGNOSIS — R19.4 CHANGE IN BOWEL HABITS: Primary | ICD-10-CM

## 2018-10-15 DIAGNOSIS — R97.20 ELEVATED PSA: ICD-10-CM

## 2018-10-15 DIAGNOSIS — D75.1 POLYCYTHEMIA: ICD-10-CM

## 2018-10-15 DIAGNOSIS — G47.30 SLEEP APNEA, UNSPECIFIED TYPE: ICD-10-CM

## 2018-10-15 NOTE — MR AVS SNAPSHOT
[facility] 107 200 [facility] 
[phone] Patient: Lazaro Concepcion MRN: [id number] Visit Information Date & Time Provider Department Dept. Phone Encounter #  
 10/15/2018  8:00 AM [facility] [phone] [id number] Follow-up Instructions Return in about 3 months (around 1/15/2019), or if symptoms worsen or fail to improve, for htn, abdominal pain. Your Appointments 10/29/2018  8:45 AM  
BIOPSY with Greg Fletcher MD  
[facility] ([address]) Appt Note: Return for TRUS bx.  
 [address] [facility] 200 [facility] 1097 [facility]  
  
   
 200 [facility]  
  
    
 11/5/2018  3:00 PM  
Office Visit with Trista Rose MD  
[address] [address] [address] [place] [address]) Appt Note: 2 MO RET  
 [address] 3200 [facility] 105 [address] [address] [address]  
  
    
  
 11/15/2018  8:45 AM  
Any with Greg Fletcher MD  
[facility] ([address] [place] [address]) Appt Note: bx follow up results 2057 [facility] 200 [address] [address] 1097 [facility]  
  
   
 2057 [facility] 2301 [facility] 100 200 [facility] Upcoming Health Maintenance Date Due Shingrix Vaccine Age 50> (1 of 2) 3/26/2005 Influenza Age 5 to Adult 8/1/2018 Pneumococcal 19-64 Highest Risk (2 of 3 - PCV13) 8/4/2018 FOBT Q 1 YEAR AGE 50-75 5/30/2019 DTaP/Tdap/Td series (2 - Td) 2/12/2028 Allergies as of 10/15/2018  Review Complete On: 10/15/2018 By: Eleuterio Felix MD  
  
 Severity Noted Reaction Type Reactions Penicillins  04/20/2012    Other (comments) Scallops  07/18/2018    Other (comments) Current Immunizations  Never Reviewed No immunizations on file. Not reviewed this visit You Were Diagnosed With   
  
 Codes Comments Change in bowel habits    -  Primary ICD-10-CM: R19.4 ICD-9-CM: 787.99 Lower abdominal pain     ICD-10-CM: R10.30 ICD-9-CM: 789.09 Sleep apnea, unspecified type     ICD-10-CM: G47.30 ICD-9-CM: 780.57 Vitals BP Pulse Temp Resp Height(growth percentile) Weight(growth percentile) 143/82 (BP 1 Location: Left arm, BP Patient Position: Sitting) 70 96.2 °F (35.7 °C) (Oral) 20 6' (1.829 m) 223 lb (101.2 kg) SpO2 BMI Smoking Status 95% 30.24 kg/m2 Never Smoker Vitals History BMI and BSA Data Body Mass Index Body Surface Area  
 30.24 kg/m 2 2.27 m 2 Preferred Pharmacy Pharmacy Name Phone [facility] [address] [phone] Your Updated Medication List  
  
   
This list is accurate as of 10/15/18  8:46 AM.  Always use your most recent med list.  
  
  
  
  
 aspirin 325 mg tablet Commonly known as:  ASPIRIN  
162 mg.  
  
 losartan-hydroCHLOROthiazide 100-12.5 mg per tablet Commonly known as:  HYZAAR Take 1 Tab by mouth daily. multivitamin tablet Commonly known as:  ONE A DAY Take 1 Tab by mouth daily. nitrofurantoin (macrocrystal-monohydrate) 100 mg capsule Commonly known as:  MACROBID Take 1 Cap by mouth as needed. VITAMIN B-12 1,000 mcg tablet Generic drug:  cyanocobalamin Take 1,000 mcg by mouth daily. VITAMIN C 250 mg tablet Generic drug:  ascorbic acid (vitamin C) Take  by mouth. We Performed the Following REFERRAL TO GASTROENTEROLOGY [TVP42 Custom] REFERRAL TO SLEEP STUDIES [REF99 Custom] Comments:  
 Patient would prefer to see sleep doctor here in [place]. Follow-up Instructions Return in about 3 months (around 1/15/2019), or if symptoms worsen or fail to improve, for htn, abdominal pain. To-Do List   
 10/15/2018 Imaging:  CT ABD PELV W CONT Referral Information Referral ID Referred By Referred To  
  
 [id number] Milind Gutierres N Not Available Visits Status Start Date End Date 1 New Request 10/15/18 10/15/19 If your referral has a status of pending review or denied, additional information will be sent to support the outcome of this decision. Referral ID Referred By Referred To  
 [id number] Milind Gutierres N Not Available Visits Status Start Date End Date 1 New Request 10/15/18 10/15/19 If your referral has a status of pending review or denied, additional information will be sent to support the outcome of this decision. Referral ID Referred By Referred To  
 [id number] Milind Remedies N Not Available Visits Status Start Date End Date 1 New Request 10/15/18 10/15/19 If your referral has a status of pending review or denied, additional information will be sent to support the outcome of this decision. Introducing \[facility] [place] [facility]\"" & HEALTH SERVICES! Dear Florentino Ponce: Thank you for requesting a [employer] account. Our records indicate that you already have an active [employer] account. You can access your account anytime at [email or web address] Did you know that you can access your hospital and ER discharge instructions at any time in Hit[employer]ister? You can also review all of your test results from your hospital stay or ER visit. Additional Information If you have questions, please visit the Frequently Asked Questions section of the [employer]ister website at [email or web address] Remember, Hit[employer]ister is NOT to be used for urgent needs. For medical emergencies, dial 911. Now available from your iPhone and Android! Please provide this summary of care documentation to your next provider. Your primary care clinician is listed as Nat Sellers. If you have any questions after today's visit, please call [phone].

## 2018-10-15 NOTE — PROGRESS NOTES
Chief Complaint   Patient presents with    Follow-up     1. Have you been to the ER, urgent care clinic since your last visit? Hospitalized since your last visit? No    2. Have you seen or consulted any other health care providers outside of the [address] [place] [address] since your last visit? Include any pap smears or colon screening. No     HPI  Lauri Ped comes in for follow-up care. Patient has abdominal pain. Abdominal pain mainly occurs early morning but the comes on and off. It is a pain that starts in the lower abdominal area and radiates to the epigastric region and then posteriorly. No fever or chills. He has noticed change in his bowel habits. There is increased frequency and there is a lot of gaseous distention. Denies nausea or vomiting. No blood in the stools. Has had a colonoscopy in the past and also on endoscopy which he states were stable. He did do stool for occult blood that was negative earlier this year. Patient also has noticed abdominal swelling that is supraumbilical.  This comes when he increases intra-abdominal pressure. This is a hernia. No signs of strangulation or a reducible mass within the hernia. We discussed hernia in the danger signs that would require intervention. Currently this is stable. Patient has sleep apnea. He has been using CPAP machine at night. He last saw his sleep doctor more than 2 years ago and would like to be referred to a sleep doctor within this vicinity. He denies daytime somnolence. Does have some fatigue and malaise. Patient has hypertension. He takes Hyzaar. Blood pressure is 143/82. Patient states that usually this is in the 038C systolic when he checks at home. I will have him do lifestyle and dietary modification. Continue with the current medication. I will recheck at next visit. He should also keep a blood pressure log and we will reviewed at next visit. Patient has L UTS with history of elevated PSA.   He has been followed up by the urologist.  Does have prostate biopsy planned in the next 2 weeks. States that for his L UTS might need to have nerve stimulator was placed. He does do self-catheterization as a he has urinary retention and neurogenic bladder. Patient has been followed up by the hematologist for polycythemia. Has had phlebotomy done in the past.  Due for blood draw tomorrow and he will follow-up with a hematologist on this. The patient declines to get the flu vaccine today. Past Medical History  Past Medical History:   Diagnosis Date    Bladder stone     Hematuria 4/9/2012    HLD (hyperlipidemia)     Hypercholesteremia     Hypertension     Kidney stones     Malignant neoplasm of other sites of gum     Nephrolithiasis 4/9/2012    Recurrent UTI     Sleep apnea     Urinary retention 4/9/2012       Surgical History  Past Surgical History:   Procedure Laterality Date    BIOPSY PROSTATE  12/2002    HX LAP CHOLECYSTECTOMY  07/08/2013    HX UROLOGICAL  10/11/2017    S/p cysto, PVP (ProTouch laser), Cystolitholapaxy     AK COLONOSCOPY FLX DX W/COLLJ SPEC WHEN PFRMD          Medications  Current Outpatient Prescriptions   Medication Sig Dispense Refill    nitrofurantoin, macrocrystal-monohydrate, (MACROBID) 100 mg capsule Take 1 Cap by mouth as needed. 40 Cap 0    losartan-hydroCHLOROthiazide (HYZAAR) 100-12.5 mg per tablet Take 1 Tab by mouth daily. 90 Tab 1    ascorbic acid, vitamin C, (VITAMIN C) 250 mg tablet Take  by mouth.  cyanocobalamin (VITAMIN B-12) 1,000 mcg tablet Take 1,000 mcg by mouth daily.  multivitamin (ONE A DAY) tablet Take 1 Tab by mouth daily.  aspirin (ASPIRIN) 325 mg tablet 162 mg.          Allergies  Allergies   Allergen Reactions    Penicillins Other (comments)    Scallops Other (comments)       Family History  Family History   Problem Relation Age of Onset    Cancer Mother     Hypertension Mother     Heart Disease Father     Diabetes Brother Social History  Social History     Social History    Marital status:      Spouse name: N/A    Number of children: N/A    Years of education: N/A     Occupational History    Not on file.      Social History Main Topics    Smoking status: Never Smoker    Smokeless tobacco: Never Used    Alcohol use No      Comment: Occasionally    Drug use: No    Sexual activity: Yes     Other Topics Concern    Not on file     Social History Narrative       Review of Systems  Review of Systems - History obtained from chart review and the patient  General ROS: negative for - chills or fever  Psychological ROS: negative  Ophthalmic ROS: negative  ENT ROS: negative  Allergy and Immunology ROS: negative  Hematological and Lymphatic ROS: Polycythemia  Endocrine ROS: negative  Respiratory ROS: no cough, shortness of breath, or wheezing  Cardiovascular ROS: no chest pain or dyspnea on exertion  Gastrointestinal ROS: positive for - abdominal pain, change in bowel habits, gas/bloating and heartburn  negative for - blood in stools, diarrhea or nausea/vomiting  Genito-Urinary ROS: Elevated PSA with urinary retention and neurogenic bladder  Musculoskeletal ROS: negative  Neurological ROS: negative    Vital Signs  Visit Vitals    /82 (BP 1 Location: Left arm, BP Patient Position: Sitting)    Pulse 70    Temp 96.2 °F (35.7 °C) (Oral)    Resp 20    Ht 6' (1.829 m)    Wt 223 lb (101.2 kg)    SpO2 95%    BMI 30.24 kg/m2         Physical Exam  Physical Examination: General appearance - oriented to person, place, and time and acyanotic, in no respiratory distress  Mental status - alert, oriented to person, place, and time, affect appropriate to mood  Mouth - mucous membranes moist, pharynx normal without lesions  Neck - supple, no significant adenopathy  Lymphatics - no palpable lymphadenopathy, no hepatosplenomegaly  Chest - clear to auscultation, no wheezes, rales or rhonchi, symmetric air entry  Heart - normal rate and regular rhythm, S1 and S2 normal  Abdomen - soft, nontender, nondistended, no masses or organomegaly  HERNIA EXAM: Supraumbilical hernia  Neurological - alert, oriented, normal speech, no focal findings or movement disorder noted  Musculoskeletal - no joint tenderness, deformity or swelling  Extremities - no pedal edema noted, intact peripheral pulses    Results  Results for orders placed or performed in visit on 09/27/18   AMB POC URINALYSIS DIP STICK AUTO W/O MICRO   Result Value Ref Range    Color (UA POC) Yellow     Clarity (UA POC) Clear     Glucose (UA POC) Negative Negative    Bilirubin (UA POC) Negative Negative    Ketones (UA POC) Negative Negative    Specific gravity (UA POC) 1.015 1.001 - 1.035    Blood (UA POC) Negative Negative    pH (UA POC) 7.0 4.6 - 8.0    Protein (UA POC) Trace Negative    Urobilinogen (UA POC) 0.2 mg/dL 0.2 - 1    Nitrites (UA POC) Negative Negative    Leukocyte esterase (UA POC) 2+ Negative   AMB POC PVR, DIANA,POST-VOID RES,US,NON-IMAGING   Result Value Ref Range    PVR 0 cc       ASSESSMENT and PLAN    ICD-10-CM ICD-9-CM    1. Change in bowel habits R19.4 787.99 CT ABD PELV W CONT      REFERRAL TO GASTROENTEROLOGY   2. Lower abdominal pain R10.30 789.09 CT ABD PELV W CONT      REFERRAL TO GASTROENTEROLOGY   3. Sleep apnea, unspecified type G47.30 780.57 REFERRAL TO SLEEP STUDIES   4. Polycythemia D75.1 238.4    5. Neurogenic bladder N31.9 596.54    6. Elevated PSA R97.20 790.93    7. Essential hypertension I10 401.9      lab results and schedule of future lab studies reviewed with patient  reviewed diet, exercise and weight control  reviewed medications and side effects in detail  radiology results and schedule of future radiology studies reviewed with patient      I have discussed the diagnosis with the patient and the intended plan of care as seen in the above orders. The patient has received an after-visit summary and questions were answered concerning future plans.  I have discussed medication, side effects, and warnings with the patient in detail. The patient verbalized understanding and is in agreement with the plan of care. The patient will contact the office with any additional concerns.     Rodrigo Murphy MD

## 2018-11-05 ENCOUNTER — DOCUMENTATION ONLY (OUTPATIENT)
Dept: ONCOLOGY | Age: 63
End: 2018-11-05

## 2018-11-05 ENCOUNTER — HOSPITAL ENCOUNTER (OUTPATIENT)
Dept: ONCOLOGY | Age: 63
Discharge: HOME | End: 2018-11-05

## 2018-11-05 ENCOUNTER — OFFICE VISIT (OUTPATIENT)
Dept: ONCOLOGY | Age: 63
End: 2018-11-05

## 2018-11-05 ENCOUNTER — HOSPITAL ENCOUNTER (OUTPATIENT)
Dept: LAB | Age: 63
Discharge: HOME | End: 2018-11-05
Payer: COMMERCIAL

## 2018-11-05 VITALS
TEMPERATURE: 98.4 F | WEIGHT: 222 LBS | SYSTOLIC BLOOD PRESSURE: 110 MMHG | HEART RATE: 72 BPM | RESPIRATION RATE: 18 BRPM | DIASTOLIC BLOOD PRESSURE: 64 MMHG | HEIGHT: 72 IN | BODY MASS INDEX: 30.07 KG/M2

## 2018-11-05 DIAGNOSIS — R77.9 ELEVATED BLOOD PROTEIN: Primary | ICD-10-CM

## 2018-11-05 DIAGNOSIS — D75.1 POLYCYTHEMIA: ICD-10-CM

## 2018-11-05 DIAGNOSIS — R77.9 ELEVATED BLOOD PROTEIN: ICD-10-CM

## 2018-11-05 LAB
ALBUMIN SERPL-MCNC: 4 G/DL (ref 3.4–5)
ALBUMIN/GLOB SERPL: 1.3 {RATIO} (ref 0.8–1.7)
ALP SERPL-CCNC: 77 U/L (ref 45–117)
ALT SERPL-CCNC: 32 U/L (ref 16–61)
ANION GAP SERPL CALC-SCNC: 11 MMOL/L (ref 3–18)
AST SERPL-CCNC: 18 U/L (ref 15–37)
BASO+EOS+MONOS # BLD AUTO: 0.9 K/UL (ref 0–2.3)
BASO+EOS+MONOS # BLD AUTO: 9 % (ref 0.1–17)
BILIRUB SERPL-MCNC: 0.4 MG/DL (ref 0.2–1)
BUN SERPL-MCNC: 17 MG/DL (ref 7–18)
BUN/CREAT SERPL: 16 (ref 12–20)
CALCIUM SERPL-MCNC: 9.2 MG/DL (ref 8.5–10.1)
CHLORIDE SERPL-SCNC: 103 MMOL/L (ref 100–108)
CO2 SERPL-SCNC: 26 MMOL/L (ref 21–32)
CREAT SERPL-MCNC: 1.08 MG/DL (ref 0.6–1.3)
DIFFERENTIAL METHOD BLD: NORMAL
ERYTHROCYTE [DISTWIDTH] IN BLOOD BY AUTOMATED COUNT: 12.1 % (ref 11.5–14.5)
GLOBULIN SER CALC-MCNC: 3.2 G/DL (ref 2–4)
GLUCOSE SERPL-MCNC: 106 MG/DL (ref 74–99)
HCT VFR BLD AUTO: 42.6 % (ref 36–48)
HGB BLD-MCNC: 14.4 G/DL (ref 12–16)
LYMPHOCYTES # BLD: 2.5 K/UL (ref 1.1–5.9)
LYMPHOCYTES NFR BLD: 26 % (ref 14–44)
MCH RBC QN AUTO: 30.1 PG (ref 25–35)
MCHC RBC AUTO-ENTMCNC: 33.8 G/DL (ref 31–37)
MCV RBC AUTO: 88.9 FL (ref 78–102)
NEUTS SEG # BLD: 6.4 K/UL (ref 1.8–9.5)
NEUTS SEG NFR BLD: 66 % (ref 40–70)
PLATELET # BLD AUTO: 220 K/UL (ref 140–440)
POTASSIUM SERPL-SCNC: 3.7 MMOL/L (ref 3.5–5.5)
PROT SERPL-MCNC: 7.2 G/DL (ref 6.4–8.2)
RBC # BLD AUTO: 4.79 M/UL (ref 4.1–5.1)
SODIUM SERPL-SCNC: 140 MMOL/L (ref 136–145)
WBC # BLD AUTO: 9.8 K/UL (ref 4.5–13)

## 2018-11-05 PROCEDURE — 80053 COMPREHEN METABOLIC PANEL: CPT | Performed by: INTERNAL MEDICINE

## 2018-11-05 NOTE — PROGRESS NOTES
Hematology/medical oncology progress note 8/6/2018 Scarlet Reading YOB: 1955 PCP: Dr. Maureen Ramsey Diagnosis: Erythrocytosis/polycythemia Mr. Nubia Bond is a 80-year-old man who is referred for evaluation of unexplained erythrocytosis. I have informed the patient that the CBC from 7/18/2018 showed a WBC count 10.2, hemoglobin 17.3 g/dL, hematocrit 51.3%, and the platelet count was 400,885. Iron studies revealed that he had an iron level of 86 mcg/dL, and iron saturation 32%, and ferritin level of 178 ng/mL. His metabolic panel revealed normal values except for slightly elevated glucose of 101 mg/dL. The DNA analysis for mutations in the JA K2 was negative. I have therefore explained to the patient that he has erythrocytosis with hematocrit exceeds 50%. Therefore therapeutic intervention with phlebotomy is indicated weekly until hematocrit is less than or equal to 42%. This will decrease his chances for venous thromboembolism, CVA, and myocardial infarction. The patient was encouraged to discontinue the use of all forms of tobacco products and to remain well-hydrated as much as possible. He had his questions answered to his satisfaction. Total time 30 greater than 50% of the time was in counseling and coordination of care. Devonte Tirado MD, [address]

## 2018-11-05 NOTE — PROGRESS NOTES
Called [facility] to cancel patients phlebotomy scheduled for tomorrow per Dr. Vidal Trejo. Patients hematocrit is 42.6 today.

## 2018-11-05 NOTE — PATIENT INSTRUCTIONS
Polycythemia: Care Instructions  Your Care Instructions    Polycythemia (say \"paw-leonor-sy-THEE-jane-uh) is an abnormal increase in red blood cells. It happens when the tissue inside your bones (bone marrow) makes too much blood. It also can occur if your blood does not have enough liquid, or plasma. This can make the number of red blood cells seem higher than normal. The extra red blood cells make your blood thicker than normal. This may raise your risk for blood clots that can cause heart attacks or strokes. Clots can form in the deep veins of the body, a condition called deep vein thrombosis. Or, a clot can travel through the blood to a lung (a pulmonary embolism). Your doctor may treat you by taking out some of your blood (phlebotomy). The process is like donating blood. Your doctor may even recommend that you donate blood. You may take pills to stop your body from making red blood cells. You also will get treatment for any other conditions that may cause your body to make too many red blood cells. Follow-up care is a key part of your treatment and safety. Be sure to make and go to all appointments, and call your doctor if you are having problems. It's also a good idea to know your test results and keep a list of the medicines you take. How can you care for yourself at home? · Be safe with medicines. Take your medicines exactly as prescribed. Call your doctor if you think you are having a problem with your medicine. · Drink plenty of fluids, enough so that your urine is light yellow or clear like water, before and after you have blood removed. If you have kidney, heart, or liver disease and have to limit fluids, talk with your doctor before you increase the amount of fluids you drink. · Take it easy after you have had blood removed. Do not do vigorous exercise. · If your doctor recommends aspirin, take it exactly as prescribed.  Call your doctor if you think you are having a problem with your medicine. · Do not smoke. Smoking increases the risk of blood clots and may reduce the amount of oxygen in your blood. If you need help quitting, talk to your doctor about stop-smoking programs and medicines. These can increase your chances of quitting for good. · Take an antihistamine, such as a nondrowsy one like loratadine (Claritin) or one that might make you sleepy like diphenhydramine (Benadryl), if your skin is itchy. Some people who have this condition have itching. · Wear medical alert jewelry that lists your clotting problem. You can buy this at most [employer]. When should you call for help? Call 911 anytime you think you may need emergency care. For example, call if:    · You have sudden chest pain and shortness of breath, or you cough up blood.     · You have symptoms of a stroke. These may include:  ? Sudden numbness, tingling, weakness, or loss of movement in your face, arm, or leg, especially on only one side of your body. ? Sudden vision changes. ? Sudden trouble speaking. ? Sudden confusion or trouble understanding simple statements. ? Sudden problems with walking or balance. ? A sudden, severe headache that is different from past headaches.     · You have symptoms of a heart attack. These may include:  ? Chest pain or pressure, or a strange feeling in the chest.  ? Sweating. ? Shortness of breath. ? Nausea or vomiting. ? Pain, pressure, or a strange feeling in the back, neck, jaw, or upper belly or in one or both shoulders or arms. ? Lightheadedness or sudden weakness. ? A fast or irregular heartbeat. After you call 911, the  may tell you to chew 1 adult-strength or 2 to 4 low-dose aspirin. Wait for an ambulance. Do not try to drive yourself.    Call your doctor now or seek immediate medical care if:    · You have signs of a blood clot, such as:  ? Pain in your calf, back of knee, thigh, or groin. ?  Redness and swelling in your leg or groin.    Watch closely for changes in your health, and be sure to contact your doctor if you have any problems. Where can you learn more? Go to http://amaya-pepito.info/. Enter X920 in the search box to learn more about \"Polycythemia: Care Instructions. \"  Current as of: May 7, 2018  Content Version: 11.8  © 1728-7159 [employer]. Care instructions adapted under license by [employer] (which disclaims liability or warranty for this information). If you have questions about a medical condition or this instruction, always ask your healthcare professional. [address] any warranty or liability for your use of this information.

## 2018-11-05 NOTE — PROGRESS NOTES
Hematology/Oncology  Progress Note    Name: Karin Dia  Date: 2018  : 1955    PCP: Ninfa Rayo MD     Mr. Sarahi Marley is a 61 y.o. man with a history of polycythemia/erythrocytosis. He received therapeutic phlebotomy whenever his hematocrit exceeds 45%. Current therapy: Therapeutic phlebotomy as needed when the hematocrit exceeds 45%. Subjective:     Mr. Sarahi Marley is a 55-year-old man with erythrocytosis/polycythemia. He is doing well at this time. He has no physical complaints to report. He denies having any shortness of breath, headache, blurred vision, or abdominal pain. He reports that his appetite is good and his activity level is excellent. Past medical history, family history, and social history: these were reviewed and remains unchanged.     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     Nephrolithiasis 2012    Recurrent UTI     Sleep apnea     Urinary retention 2012     Past Surgical History:   Procedure Laterality Date    BIOPSY PROSTATE  2002    HX LAP CHOLECYSTECTOMY  2013    HX UROLOGICAL  10/11/2017    S/p cysto, PVP (ProTouch laser), Cystolitholapaxy     AR COLONOSCOPY FLX DX W/COLLJ SPEC WHEN PFRMD       Social History     Socioeconomic History    Marital status:      Spouse name: Not on file    Number of children: Not on file    Years of education: Not on file    Highest education level: Not on file   Social Needs    Financial resource strain: Not on file    Food insecurity - worry: Not on file    Food insecurity - inability: Not on file   [employer] needs - medical: Not on file   [employer] needs - non-medical: Not on file   Occupational History    Not on file   Tobacco Use    Smoking status: Never Smoker    Smokeless tobacco: Never Used   Substance and Sexual Activity    Alcohol use: No     Comment: Occasionally    Drug use: No    Sexual activity: Yes   Other Topics Concern    Not on file   Social History Narrative    Not on file     Family History   Problem Relation Age of Onset   [facility] Mother     Hypertension Mother     Heart Disease Father     Diabetes Brother      Current Outpatient Medications   Medication Sig Dispense Refill    aspirin (ASPIRIN) 325 mg tablet 162 mg.      nitrofurantoin, macrocrystal-monohydrate, (MACROBID) 100 mg capsule Take 1 Cap by mouth as needed. 40 Cap 0    losartan-hydroCHLOROthiazide (HYZAAR) 100-12.5 mg per tablet Take 1 Tab by mouth daily. 90 Tab 1    ascorbic acid, vitamin C, (VITAMIN C) 250 mg tablet Take  by mouth.  cyanocobalamin (VITAMIN B-12) 1,000 mcg tablet Take 1,000 mcg by mouth daily.  multivitamin (ONE A DAY) tablet Take 1 Tab by mouth daily. Review of Systems  Constitutional: The patient has no acute distress or discomfort. HEENT: The patient denies recent head trauma, eye pain, blurred vision,  hearing deficit, oropharyngeal mucosal pain or lesions, and the patient denies throat pain or discomfort. Lymphatics: The patient denies palpable peripheral lymphadenopathy. Hematologic: The patient denies having bruising, bleeding, or progressive fatigue. Respiratory: Patient denies having shortness of breath, cough, sputum production, fever, or dyspnea on exertion. Cardiovascular: The patient denies having leg pain, leg swelling, heart palpitations, chest permit, chest pain, or lightheadedness. The patient denies having dyspnea on exertion. Gastrointestinal: The patient denies having nausea, emesis, or diarrhea. The patient denies having any hematemesis or blood in the stool. Genitourinary: Patient denies having urinary urgency, frequency, or dysuria. The patient denies having blood in the urine.   Psychological: The patient denies having symptoms of nervousness, anxiety, depression, or thoughts of harming self. Skin: Patient denies having skin rashes, skin, ulcerations, or unexplained itching or pruritus. Musculoskeletal: The patient denies having pain in the joints or bones. Objective:     Visit Vitals  /64   Pulse 72   Temp 98.4 °F (36.9 °C)   Resp 18   Ht 6' (1.829 m)   Wt 100.7 kg (222 lb 0.1 oz)   BMI 30.11 kg/m²     ECOG PS=0  Physical Exam:   Gen. Appearance: The patient is in no acute distress. Skin: There is no bruise or rash. HEENT: The exam is unremarkable. Neck: Supple without lymphadenopathy or thyromegaly. Lungs: Clear to auscultation and percussion; there are no wheezes or rhonchi. Heart: Regular rate and rhythm; there are no murmurs, gallops, or rubs. Abdomen: Bowel sounds are present and normal.  There is no guarding, tenderness, or hepatosplenomegaly. Extremities: There is no clubbing, cyanosis, or edema. Neurologic: There are no focal neurologic deficits. Lymphatics: There is no palpable peripheral lymphadenopathy. Musculoskeletal: The patient has full range of motion at all joints. There is no evidence of joint deformity or effusions. There is no focal joint tenderness. Psychological/psychiatric: There is no clinical evidence of anxiety, depression, or melancholy. Lab data:      Results for orders placed or performed during the hospital encounter of 11/05/18   CBC WITH 3 PART DIFF     Status: None   Result Value Ref Range Status    WBC 9.8 4.5 - 13.0 K/uL Final    RBC 4.79 4.10 - 5.10 M/uL Final    HGB 14.4 12.0 - 16 g/dL Final    HCT 42.6 36 - 48 % Final    MCV 88.9 78 - 102 FL Final    MCH 30.1 25.0 - 35.0 PG Final    MCHC 33.8 31 - 37 g/dL Final    RDW 12.1 11.5 - 14.5 % Final    PLATELET 321 959 - 641 K/uL Final    NEUTROPHILS 66 40 - 70 % Final    MIXED CELLS 9 0.1 - 17 % Final    LYMPHOCYTES 26 14 - 44 % Final    ABS. NEUTROPHILS 6.4 1.8 - 9.5 K/UL Final    ABS. MIXED CELLS 0.9 0.0 - 2.3 K/uL Final    ABS.  LYMPHOCYTES 2.5 1.1 - 5.9 K/UL Final     Comment: Test performed at [place] [address] Location. Results Reviewed by Medical Director. DF AUTOMATED   Final           Assessment:     1. Elevated blood protein    2. Polycythemia        Plan:   Elevated blood protein: At this time I will check a comprehensive metabolic panel. A previous SPEP was negative for monoclonal paraprotein. Polycythemia/erythrocytosis: Comprehensive metabolic panel will be obtained. The current CBC shows a hemoglobin of 14.4 g/dL with hematocrit of 42.6%. The patient was advised that he will not need therapeutic phlebotomy this month. He will follow-up in the infusion center in 1 month with a CBC and if his hematocrit exceeds 45% therapeutic phlebotomy will start. Follow-up in 2 months  Orders Placed This Encounter    COMPLETE CBC & AUTO DIFF WBC    InHouse CBC ([employer])     Standing Status:   Future     Number of Occurrences:   1     Standing Expiration Date:   06/03/8993    METABOLIC PANEL, COMPREHENSIVE     Standing Status:   Future     Standing Expiration Date:   11/6/2019       Baron Roney MD  11/5/2018      Please note: This document has been produced using voice recognition software. Unrecognized errors in transcription may be present.

## 2018-12-03 ENCOUNTER — DOCUMENTATION ONLY (OUTPATIENT)
Dept: ONCOLOGY | Age: 63
End: 2018-12-03

## 2018-12-03 NOTE — PROGRESS NOTES
Spoke with Mr. Derek Venegas regarding his monthly phlebotomy at [facility]. He is to call them an get scheduled for this month. Dr. Carin Lewis only held November phlebotomy due to blood levels. Patient was aware and understanding. He states he is calling them now to schedule himself to be seen.

## 2018-12-13 DIAGNOSIS — R19.4 CHANGE IN BOWEL HABITS: ICD-10-CM

## 2018-12-13 DIAGNOSIS — R10.30 LOWER ABDOMINAL PAIN: ICD-10-CM

## 2019-01-07 ENCOUNTER — OFFICE VISIT (OUTPATIENT)
Dept: ONCOLOGY | Age: 64
End: 2019-01-07

## 2019-01-07 ENCOUNTER — HOSPITAL ENCOUNTER (OUTPATIENT)
Dept: LAB | Age: 64
Discharge: HOME | End: 2019-01-07
Payer: COMMERCIAL

## 2019-01-07 ENCOUNTER — HOSPITAL ENCOUNTER (OUTPATIENT)
Dept: ONCOLOGY | Age: 64
Discharge: HOME | End: 2019-01-07

## 2019-01-07 ENCOUNTER — DOCUMENTATION ONLY (OUTPATIENT)
Dept: ONCOLOGY | Age: 64
End: 2019-01-07

## 2019-01-07 VITALS
HEIGHT: 72 IN | BODY MASS INDEX: 31.97 KG/M2 | TEMPERATURE: 98.1 F | DIASTOLIC BLOOD PRESSURE: 99 MMHG | OXYGEN SATURATION: 100 % | HEART RATE: 88 BPM | SYSTOLIC BLOOD PRESSURE: 181 MMHG | WEIGHT: 236 LBS | RESPIRATION RATE: 16 BRPM

## 2019-01-07 DIAGNOSIS — D75.1 POLYCYTHEMIA: ICD-10-CM

## 2019-01-07 DIAGNOSIS — D75.1 POLYCYTHEMIA: Primary | ICD-10-CM

## 2019-01-07 DIAGNOSIS — D75.1 ERYTHROCYTOSIS: ICD-10-CM

## 2019-01-07 DIAGNOSIS — R77.9 ELEVATED BLOOD PROTEIN: ICD-10-CM

## 2019-01-07 LAB
ALBUMIN SERPL-MCNC: 4.2 G/DL (ref 3.4–5)
ALBUMIN/GLOB SERPL: 1.2 {RATIO} (ref 0.8–1.7)
ALP SERPL-CCNC: 80 U/L (ref 45–117)
ALT SERPL-CCNC: 37 U/L (ref 16–61)
ANION GAP SERPL CALC-SCNC: 8 MMOL/L (ref 3–18)
AST SERPL-CCNC: 25 U/L (ref 15–37)
BASO+EOS+MONOS # BLD AUTO: 0.9 K/UL (ref 0–2.3)
BASO+EOS+MONOS NFR BLD AUTO: 10 % (ref 0.1–17)
BILIRUB SERPL-MCNC: 0.7 MG/DL (ref 0.2–1)
BUN SERPL-MCNC: 17 MG/DL (ref 7–18)
BUN/CREAT SERPL: 14 (ref 12–20)
CALCIUM SERPL-MCNC: 8.9 MG/DL (ref 8.5–10.1)
CHLORIDE SERPL-SCNC: 104 MMOL/L (ref 100–108)
CO2 SERPL-SCNC: 26 MMOL/L (ref 21–32)
CREAT SERPL-MCNC: 1.19 MG/DL (ref 0.6–1.3)
DIFFERENTIAL METHOD BLD: ABNORMAL
ERYTHROCYTE [DISTWIDTH] IN BLOOD BY AUTOMATED COUNT: 13.5 % (ref 11.5–14.5)
FERRITIN SERPL-MCNC: 19 NG/ML (ref 8–388)
GLOBULIN SER CALC-MCNC: 3.4 G/DL (ref 2–4)
GLUCOSE SERPL-MCNC: 187 MG/DL (ref 74–99)
HCT VFR BLD AUTO: 47.9 % (ref 36–48)
HGB BLD-MCNC: 16.1 G/DL (ref 12–16)
IRON SATN MFR SERPL: 17 %
IRON SERPL-MCNC: 59 UG/DL (ref 50–175)
LYMPHOCYTES # BLD: 2.4 K/UL (ref 1.1–5.9)
LYMPHOCYTES NFR BLD: 27 % (ref 14–44)
MCH RBC QN AUTO: 28.1 PG (ref 25–35)
MCHC RBC AUTO-ENTMCNC: 33.6 G/DL (ref 31–37)
MCV RBC AUTO: 83.7 FL (ref 78–102)
NEUTS SEG # BLD: 5.6 K/UL (ref 1.8–9.5)
NEUTS SEG NFR BLD: 64 % (ref 40–70)
PLATELET # BLD AUTO: 196 K/UL (ref 140–440)
POTASSIUM SERPL-SCNC: 3.6 MMOL/L (ref 3.5–5.5)
PROT SERPL-MCNC: 7.6 G/DL (ref 6.4–8.2)
RBC # BLD AUTO: 5.72 M/UL (ref 4.1–5.1)
SODIUM SERPL-SCNC: 138 MMOL/L (ref 136–145)
TIBC SERPL-MCNC: 353 UG/DL (ref 250–450)
WBC # BLD AUTO: 8.9 K/UL (ref 4.5–13)

## 2019-01-07 PROCEDURE — 82728 ASSAY OF FERRITIN: CPT

## 2019-01-07 PROCEDURE — 36415 COLL VENOUS BLD VENIPUNCTURE: CPT

## 2019-01-07 PROCEDURE — 80053 COMPREHEN METABOLIC PANEL: CPT

## 2019-01-07 PROCEDURE — 83540 ASSAY OF IRON: CPT

## 2019-01-07 NOTE — PROGRESS NOTES
Hematology/Oncology  Progress Note    Name: Mars Blanco  Date: 2019  : 1955    PCP: En Mascorro MD     Mr. Renaldo Mosquera is a 61 y.o. man with a history of polycythemia/erythrocytosis. He received therapeutic phlebotomy whenever his hematocrit exceeds 45%. Current therapy: Therapeutic phlebotomy as needed when the hematocrit exceeds 45%. Subjective:     Mr. Renaldo Mosquera is a 75-year-old man with erythrocytosis/polycythemia. He is doing well at this time. He has no physical complaints to report. He denies having any shortness of breath, headache, blurred vision, or abdominal pain. He reports that his appetite is good and his activity level is excellent. Past medical history, family history, and social history: these were reviewed and remains unchanged.     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     Nephrolithiasis 2012    Recurrent UTI     Sleep apnea     Urinary retention 2012     Past Surgical History:   Procedure Laterality Date    BIOPSY PROSTATE  2002    HX LAP CHOLECYSTECTOMY  2013    HX UROLOGICAL  10/11/2017    S/p cysto, PVP (ProTouch laser), Cystolitholapaxy     [place] COLONOSCOPY FLX DX W/COLLJ SPEC WHEN PFRMD       Social History     Socioeconomic History    Marital status:      Spouse name: Not on file    Number of children: Not on file    Years of education: Not on file    Highest education level: Not on file   Social Needs    Financial resource strain: Not on file    Food insecurity - worry: Not on file    Food insecurity - inability: Not on file   [employer] needs - medical: Not on file   [employer] needs - non-medical: Not on file   Occupational History    Not on file   Tobacco Use    Smoking status: Never Smoker    Smokeless tobacco: Never Used   Substance and Sexual Activity    Alcohol use: No     Comment: Occasionally    Drug use: No    Sexual activity: Yes   Other Topics Concern    Not on file   Social History Narrative    Not on file     Family History   Problem Relation Age of Onset    Cancer Mother     Hypertension Mother     Heart Disease Father     Diabetes Brother      Current Outpatient Medications   Medication Sig Dispense Refill    linezolid (ZYVOX) 600 mg tablet Take 1 Tab by mouth two (2) times a day. 14 Tab 0    nitrofurantoin (MACRODANTIN) 100 mg capsule Take 1 Cap by mouth two (2) times a day. 14 Cap 0    aspirin (ASPIRIN) 325 mg tablet 162 mg.      nitrofurantoin, macrocrystal-monohydrate, (MACROBID) 100 mg capsule Take 1 Cap by mouth as needed. 40 Cap 0    losartan-hydroCHLOROthiazide (HYZAAR) 100-12.5 mg per tablet Take 1 Tab by mouth daily. 90 Tab 1    ascorbic acid, vitamin C, (VITAMIN C) 250 mg tablet Take  by mouth.  cyanocobalamin (VITAMIN B-12) 1,000 mcg tablet Take 1,000 mcg by mouth daily.  multivitamin (ONE A DAY) tablet Take 1 Tab by mouth daily. Review of Systems  Constitutional: The patient has no acute distress or discomfort. HEENT: The patient denies recent head trauma, eye pain, blurred vision,  hearing deficit, oropharyngeal mucosal pain or lesions, and the patient denies throat pain or discomfort. Lymphatics: The patient denies palpable peripheral lymphadenopathy. Hematologic: The patient denies having bruising, bleeding, or progressive fatigue. Respiratory: Patient denies having shortness of breath, cough, sputum production, fever, or dyspnea on exertion. Cardiovascular: The patient denies having leg pain, leg swelling, heart palpitations, chest permit, chest pain, or lightheadedness. The patient denies having dyspnea on exertion. Gastrointestinal: The patient denies having nausea, emesis, or diarrhea. The patient denies having any hematemesis or blood in the stool.   Genitourinary: Patient denies having urinary urgency, frequency, or dysuria. The patient denies having blood in the urine. Psychological: The patient denies having symptoms of nervousness, anxiety, depression, or thoughts of harming self. Skin: Patient denies having skin rashes, skin, ulcerations, or unexplained itching or pruritus. Musculoskeletal: The patient denies having pain in the joints or bones. Objective:     Visit Vitals  BP (!) 181/99   Pulse 88   Temp 98.1 °F (36.7 °C) (Oral)   Resp 16   Ht 6' (1.829 m)   Wt 107 kg (236 lb)   SpO2 100%   BMI 32.01 kg/m²     ECOG PS=0  Physical Exam:   Gen. Appearance: The patient is in no acute distress. Skin: There is no bruise or rash. HEENT: The exam is unremarkable. Neck: Supple without lymphadenopathy or thyromegaly. Lungs: Clear to auscultation and percussion; there are no wheezes or rhonchi. Heart: Regular rate and rhythm; there are no murmurs, gallops, or rubs. Abdomen: Bowel sounds are present and normal.  There is no guarding, tenderness, or hepatosplenomegaly. Extremities: There is no clubbing, cyanosis, or edema. Neurologic: There are no focal neurologic deficits. Lymphatics: There is no palpable peripheral lymphadenopathy. Musculoskeletal: The patient has full range of motion at all joints. There is no evidence of joint deformity or effusions. There is no focal joint tenderness. Psychological/psychiatric: There is no clinical evidence of anxiety, depression, or melancholy. Lab data:      Results for orders placed or performed during the hospital encounter of 01/07/19   CBC WITH 3 PART DIFF     Status: Abnormal   Result Value Ref Range Status    WBC 8.9 4.5 - 13.0 K/uL Final    RBC 5.72 (H) 4.10 - 5.10 M/uL Final    HGB 16.1 (HH) 12.0 - 16.0 g/dL Final     Comment: CALLED TO AND CORRECTLY REPEATED BY:  CRITICAL RESULTS VERIFIED AND GIVEN TO JENNIFER AT 11:45.  GI      HCT 47.9 36 - 48 % Final    MCV 83.7 78 - 102 FL Final    MCH 28.1 25.0 - 35.0 PG Final    MCHC 33.6 31 - 37 g/dL Final    RDW 13.5 11.5 - 14.5 % Final    PLATELET 725 604 - 816 K/uL Final    NEUTROPHILS 64 40 - 70 % Final    MIXED CELLS 10 0.1 - 17 % Final    LYMPHOCYTES 27 14 - 44 % Final    ABS. NEUTROPHILS 5.6 1.8 - 9.5 K/UL Final    ABS. MIXED CELLS 0.9 0.0 - 2.3 K/uL Final    ABS. LYMPHOCYTES 2.4 1.1 - 5.9 K/UL Final     Comment: Test performed at [place] [id number] Location. Results Reviewed by Medical Director. DF AUTOMATED   Final           Assessment:     1. Polycythemia    2. Elevated blood protein    3. Erythrocytosis        Plan:   Elevated blood protein: At this time I will check a comprehensive metabolic panel. A previous SPEP was negative for monoclonal paraprotein. Polycythemia/erythrocytosis: Comprehensive metabolic panel will be obtained. The current CBC shows a hemoglobin of 16.1 g/dL with hematocrit of 47.9 %. The patient was advised that he will not need therapeutic phlebotomy this month. Phlebotomy will began probably tomorrow and will continue weekly until his hematocrit is below 42%. Follow-up in 2 months  Orders Placed This Encounter    COMPLETE CBC & AUTO DIFF WBC    InHouse CBC ([facility])     Standing Status:   Future     Number of Occurrences:   1     Standing Expiration Date:   4/72/8715    METABOLIC PANEL, COMPREHENSIVE     Standing Status:   Future     Standing Expiration Date:   1/8/2020    IRON PROFILE     Standing Status:   Future     Standing Expiration Date:   2/8/1260    METABOLIC PANEL, COMPREHENSIVE     Standing Status:   Future     Standing Expiration Date:   1/8/2020    IRON PROFILE     Standing Status:   Future     Standing Expiration Date:   1/8/2020    FERRITIN     Standing Status:   Future     Standing Expiration Date:   1/8/2020       Kathy Salgado MD  1/7/2019      Please note: This document has been produced using voice recognition software. Unrecognized errors in transcription may be present.

## 2019-01-15 ENCOUNTER — OFFICE VISIT (OUTPATIENT)
Dept: FAMILY MEDICINE CLINIC | Age: 64
End: 2019-01-15

## 2019-01-15 ENCOUNTER — TELEPHONE (OUTPATIENT)
Dept: FAMILY MEDICINE CLINIC | Age: 64
End: 2019-01-15

## 2019-01-15 VITALS
BODY MASS INDEX: 31.56 KG/M2 | TEMPERATURE: 97.8 F | HEIGHT: 72 IN | SYSTOLIC BLOOD PRESSURE: 150 MMHG | HEART RATE: 75 BPM | WEIGHT: 233 LBS | RESPIRATION RATE: 18 BRPM | OXYGEN SATURATION: 95 % | DIASTOLIC BLOOD PRESSURE: 86 MMHG

## 2019-01-15 DIAGNOSIS — R33.9 URINARY RETENTION: ICD-10-CM

## 2019-01-15 DIAGNOSIS — N31.9 NEUROGENIC BLADDER: ICD-10-CM

## 2019-01-15 DIAGNOSIS — N20.0 KIDNEY STONES: ICD-10-CM

## 2019-01-15 DIAGNOSIS — I10 ESSENTIAL HYPERTENSION: Primary | ICD-10-CM

## 2019-01-15 DIAGNOSIS — D75.1 POLYCYTHEMIA: ICD-10-CM

## 2019-01-15 RX ORDER — NITROFURANTOIN 25; 75 MG/1; MG/1
100 CAPSULE ORAL AS NEEDED
Qty: 40 CAP | Refills: 0 | Status: SHIPPED | OUTPATIENT
Start: 2019-01-15 | End: 2019-01-17 | Stop reason: SDUPTHER

## 2019-01-15 RX ORDER — GLUCOSAMINE SULFATE 1500 MG
POWDER IN PACKET (EA) ORAL DAILY
COMMUNITY

## 2019-01-15 RX ORDER — AMLODIPINE BESYLATE 5 MG/1
5 TABLET ORAL DAILY
Qty: 90 TAB | Refills: 1 | Status: SHIPPED | OUTPATIENT
Start: 2019-01-15 | End: 2019-05-21 | Stop reason: ALTCHOICE

## 2019-01-15 RX ORDER — ACETAMINOPHEN 500 MG
TABLET ORAL
COMMUNITY

## 2019-01-15 NOTE — PROGRESS NOTES
Chief Complaint   Patient presents with    Follow-up     HTN and Abd Pain     1. Have you been to the ER, urgent care clinic since your last visit? Hospitalized since your last visit? No    2. Have you seen or consulted any other health care providers outside of the [address] [place] [address] since your last visit? Include any pap smears or colon screening. Yes Suresh Budjason On 12- for outpatient bladder surgery     HPI  Patricia Armendariz comes in for follow-up care. Patient has hypertension. Blood pressure has been elevated when he checks it at home. Today it is 150/86. He denies headache, changes in vision or focal weakness. Has been on  Hyzaar. I will add amlodipine 5 mg daily. Follow-up at next visit. He will keep a blood pressure log. Patient has history of nephrolithiasis. Has been followed up by the urologist.  He does have abdominal pain on and off. Currently awaits stone analysis and that he does have a follow-up appointment with the urologist.  He has a history of neurogenic bladder. Has had a device inserted to help with this. This has been helping somewhat lately and he has been able to control his micturition for a while. He still has to self catheterize. We will follow-up with the urologist on this. He had a colonoscopy done. Did have some polyps. Recheck colonoscopy in 2023. Patient has a history of recurrent UTIs. He takes Macrobid for this. He is out of the medication. Had requested a refill of this. I will send this to him. He will follow-up with urologist.  Patient has a history of polycythemia. Has been getting multiple phlebotomies. Due for recheck CBC today. Will have him follow-up with a hematologist on this.       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  cholecalciferol (VITAMIN D3) 1,000 unit cap Take  by mouth daily.  nitrofurantoin, macrocrystal-monohydrate, (MACROBID) 100 mg capsule Take 1 Cap by mouth as needed. 40 Cap 0    losartan-hydroCHLOROthiazide (HYZAAR) 100-12.5 mg per tablet Take 1 Tab by mouth daily. 90 Tab 1    ascorbic acid, vitamin C, (VITAMIN C) 250 mg tablet Take  by mouth.  cyanocobalamin (VITAMIN B-12) 1,000 mcg tablet Take 1,000 mcg by mouth daily.  multivitamin (ONE A DAY) tablet Take 1 Tab by mouth daily.  linezolid (ZYVOX) 600 mg tablet Take 1 Tab by mouth two (2) times a day. 14 Tab 0    nitrofurantoin (MACRODANTIN) 100 mg capsule Take 1 Cap by mouth two (2) times a day. 14 Cap 0    aspirin (ASPIRIN) 325 mg tablet 162 mg.          Allergies  Allergies   Allergen Reactions    Penicillins Other (comments)    Scallops Other (comments)       Family History  Family History   Problem Relation Age of Onset    Cancer Mother     Hypertension Mother     Heart Disease Father     Diabetes Brother        Social History  Social History     Socioeconomic History    Marital status:      Spouse name: Not on file    Number of children: Not on file    Years of education: Not on file    Highest education level: Not on file   Social Needs    Financial resource strain: Not on file    Food insecurity - worry: Not on file    Food insecurity - inability: Not on file   [address] [place] [address] Transportation needs - medical: Not on file   [employer] needs - non-medical: Not on file   Occupational History    Not on file   Tobacco Use    Smoking status: Never Smoker    Smokeless tobacco: Never Used   Substance and Sexual Activity    Alcohol use: No     Comment: Occasionally    Drug use: No    Sexual activity: Yes   Other Topics Concern    Not on file   Social History Narrative    Not on file       Review of Systems  Review of Systems -review of all systems negative except as noted above in the HPI.     Vital Signs  Visit Vitals  /79 (BP 1 Location: Left arm, BP Patient Position: Sitting)   Pulse 75   Temp 97.8 °F (36.6 °C) (Oral)   Resp 18   Ht 6' (1.829 m)   Wt 233 lb (105.7 kg)   SpO2 95%   BMI 31.60 kg/m²         Physical Exam  Physical Examination: General appearance - oriented to person, place, and time, overweight and acyanotic, in no respiratory distress  Mental status - alert, oriented to person, place, and time, affect appropriate to mood  Chest - clear to auscultation, no wheezes, rales or rhonchi, symmetric air entry  Heart - normal rate and regular rhythm, S1 and S2 normal  Abdomen - no rebound tenderness noted  Neurological - motor and sensory grossly normal bilaterally  Musculoskeletal - full range of motion without pain  Extremities - intact peripheral pulses    Results  Results for orders placed or performed during the hospital encounter of 35/84/62   METABOLIC PANEL, COMPREHENSIVE   Result Value Ref Range    Sodium 138 136 - 145 mmol/L    Potassium 3.6 3.5 - 5.5 mmol/L    Chloride 104 100 - 108 mmol/L    CO2 26 21 - 32 mmol/L    Anion gap 8 3.0 - 18 mmol/L    Glucose 187 (H) 74 - 99 mg/dL    BUN 17 7.0 - 18 MG/DL    Creatinine 1.19 0.6 - 1.3 MG/DL    BUN/Creatinine ratio 14 12 - 20      GFR est AA >60 >60 ml/min/1.73m2    GFR est non-AA >60 >60 ml/min/1.73m2    Calcium 8.9 8.5 - 10.1 MG/DL    Bilirubin, total 0.7 0.2 - 1.0 MG/DL    ALT (SGPT) 37 16 - 61 U/L    AST (SGOT) 25 15 - 37 U/L    Alk. phosphatase 80 45 - 117 U/L    Protein, total 7.6 6.4 - 8.2 g/dL    Albumin 4.2 3.4 - 5.0 g/dL    Globulin 3.4 2.0 - 4.0 g/dL    A-G Ratio 1.2 0.8 - 1.7     IRON PROFILE   Result Value Ref Range    Iron 59 50 - 175 ug/dL    TIBC 353 250 - 450 ug/dL    Iron % saturation 17 %   FERRITIN   Result Value Ref Range    Ferritin 19 8 - 388 NG/ML       ASSESSMENT and PLAN  There are no diagnoses linked to this encounter. ICD-10-CM ICD-9-CM    1. Essential hypertension I10 401.9 amLODIPine (NORVASC) 5 mg tablet   2. Urinary retention R33.9 788.20 nitrofurantoin, macrocrystal-monohydrate, (MACROBID) 100 mg capsule   3. Neurogenic bladder N31.9 596.54 nitrofurantoin, macrocrystal-monohydrate, (MACROBID) 100 mg capsule   4. Kidney stones N20.0 592.0    5. Polycythemia D75.1 238.4      lab results and schedule of future lab studies reviewed with patient  reviewed diet, exercise and weight control  reviewed medications and side effects in detail    I have discussed the diagnosis with the patient and the intended plan of care as seen in the above orders. The patient has received an after-visit summary and questions were answered concerning future plans. I have discussed medication, side effects, and warnings with the patient in detail. The patient verbalized understanding and is in agreement with the plan of care. The patient will contact the office with any additional concerns. Padilla Serrano MD              Discussed the patient's BMI with him. The BMI follow up plan is as follows:     dietary management education, guidance, and counseling  encourage exercise  monitor weight  prescribed dietary intake    An After Visit Summary was printed and given to the patient.

## 2019-01-15 NOTE — PATIENT INSTRUCTIONS
Body Mass Index: Care Instructions  Your Care Instructions    Body mass index (BMI) can help you see if your weight is raising your risk for health problems. It uses a formula to compare how much you weigh with how tall you are. · A BMI lower than 18.5 is considered underweight. · A BMI between 18.5 and 24.9 is considered healthy. · A BMI between 25 and 29.9 is considered overweight. A BMI of 30 or higher is considered obese. If your BMI is in the normal range, it means that you have a lower risk for weight-related health problems. If your BMI is in the overweight or obese range, you may be at increased risk for weight-related health problems, such as high blood pressure, heart disease, stroke, arthritis or joint pain, and diabetes. If your BMI is in the underweight range, you may be at increased risk for health problems such as fatigue, lower protection (immunity) against illness, muscle loss, bone loss, hair loss, and hormone problems. BMI is just one measure of your risk for weight-related health problems. You may be at higher risk for health problems if you are not active, you eat an unhealthy diet, or you drink too much alcohol or use tobacco products. Follow-up care is a key part of your treatment and safety. Be sure to make and go to all appointments, and call your doctor if you are having problems. It's also a good idea to know your test results and keep a list of the medicines you take. How can you care for yourself at home? · Practice healthy eating habits. This includes eating plenty of fruits, vegetables, whole grains, lean protein, and low-fat dairy. · If your doctor recommends it, get more exercise. Walking is a good choice. Bit by bit, increase the amount you walk every day. Try for at least 30 minutes on most days of the week. · Do not smoke. Smoking can increase your risk for health problems. If you need help quitting, talk to your doctor about stop-smoking programs and medicines. These can increase your chances of quitting for good. · Limit alcohol to 2 drinks a day for men and 1 drink a day for women. Too much alcohol can cause health problems. If you have a BMI higher than 25  · Your doctor may do other tests to check your risk for weight-related health problems. This may include measuring the distance around your waist. A waist measurement of more than 40 inches in men or 35 inches in women can increase the risk of weight-related health problems. · Talk with your doctor about steps you can take to stay healthy or improve your health. You may need to make lifestyle changes to lose weight and stay healthy, such as changing your diet and getting regular exercise. If you have a BMI lower than 18.5  · Your doctor may do other tests to check your risk for health problems. · Talk with your doctor about steps you can take to stay healthy or improve your health. You may need to make lifestyle changes to gain or maintain weight and stay healthy, such as getting more healthy foods in your diet and doing exercises to build muscle. Where can you learn more? Go to http://amaya-pepito.info/. Enter S176 in the search box to learn more about \"Body Mass Index: Care Instructions. \"  Current as of: October 13, 2016  Content Version: 11.4  © 9468-2446 [employer]. Care instructions adapted under license by [employer] (which disclaims liability or warranty for this information). If you have questions about a medical condition or this instruction, always ask your healthcare professional. [address] any warranty or liability for your use of this information.

## 2019-03-06 ENCOUNTER — TELEPHONE (OUTPATIENT)
Dept: FAMILY MEDICINE CLINIC | Age: 64
End: 2019-03-06

## 2019-03-06 RX ORDER — OLMESARTAN MEDOXOMIL 40 MG/1
40 TABLET ORAL DAILY
Qty: 30 TAB | Refills: 3 | Status: SHIPPED | OUTPATIENT
Start: 2019-03-06 | End: 2019-03-07 | Stop reason: SDUPTHER

## 2019-03-06 NOTE — TELEPHONE ENCOUNTER
Patient called stating his Urologist is asking to change his BP medication from losartan 100-12. 5. He want the second number changed (the dieretic) He is also taking the amlodipine. He wants to know if this is a dieretic. If so, he doesn't want to change things. Patient would like a call back by the end of the day as he will be out tomorrow and Friday.

## 2019-03-07 NOTE — TELEPHONE ENCOUNTER
Called patient at this time and gave him recmendations of provider. Patient ask did Rupesh Crowder read is conversation between his urologist regarding recommendation he suggest. Informed patient I wasn't sure at this time and I will clarify with Rupesh Crowder when he return on Monday. Patient ask of he should take medication  prescribe informed patient to hold off until we get clarification.  Patient is looking for clarification on Monday 03/11/2019

## 2019-03-11 ENCOUNTER — HOSPITAL ENCOUNTER (OUTPATIENT)
Dept: ONCOLOGY | Age: 64
Discharge: HOME | End: 2019-03-11

## 2019-03-11 ENCOUNTER — HOSPITAL ENCOUNTER (OUTPATIENT)
Dept: LAB | Age: 64
Discharge: HOME | End: 2019-03-11
Payer: COMMERCIAL

## 2019-03-11 ENCOUNTER — OFFICE VISIT (OUTPATIENT)
Dept: ONCOLOGY | Age: 64
End: 2019-03-11

## 2019-03-11 VITALS
BODY MASS INDEX: 31.97 KG/M2 | HEART RATE: 81 BPM | SYSTOLIC BLOOD PRESSURE: 151 MMHG | OXYGEN SATURATION: 99 % | WEIGHT: 236 LBS | DIASTOLIC BLOOD PRESSURE: 75 MMHG | TEMPERATURE: 97.1 F | HEIGHT: 72 IN | RESPIRATION RATE: 18 BRPM

## 2019-03-11 DIAGNOSIS — D75.1 POLYCYTHEMIA: ICD-10-CM

## 2019-03-11 DIAGNOSIS — D75.1 ERYTHROCYTOSIS: ICD-10-CM

## 2019-03-11 DIAGNOSIS — D75.1 ERYTHROCYTOSIS: Primary | ICD-10-CM

## 2019-03-11 LAB
ALBUMIN SERPL-MCNC: 4.2 G/DL (ref 3.4–5)
ALBUMIN/GLOB SERPL: 1.4 {RATIO} (ref 0.8–1.7)
ALP SERPL-CCNC: 72 U/L (ref 45–117)
ALT SERPL-CCNC: 39 U/L (ref 16–61)
ANION GAP SERPL CALC-SCNC: 7 MMOL/L (ref 3–18)
AST SERPL-CCNC: 24 U/L (ref 15–37)
BASO+EOS+MONOS # BLD AUTO: 0.8 K/UL (ref 0–2.3)
BASO+EOS+MONOS NFR BLD AUTO: 7 % (ref 0.1–17)
BILIRUB SERPL-MCNC: 0.8 MG/DL (ref 0.2–1)
BUN SERPL-MCNC: 22 MG/DL (ref 7–18)
BUN/CREAT SERPL: 20 (ref 12–20)
CALCIUM SERPL-MCNC: 9.1 MG/DL (ref 8.5–10.1)
CHLORIDE SERPL-SCNC: 104 MMOL/L (ref 100–108)
CO2 SERPL-SCNC: 30 MMOL/L (ref 21–32)
CREAT SERPL-MCNC: 1.1 MG/DL (ref 0.6–1.3)
DIFFERENTIAL METHOD BLD: ABNORMAL
ERYTHROCYTE [DISTWIDTH] IN BLOOD BY AUTOMATED COUNT: 14.4 % (ref 11.5–14.5)
FERRITIN SERPL-MCNC: 11 NG/ML (ref 8–388)
GLOBULIN SER CALC-MCNC: 3.1 G/DL (ref 2–4)
GLUCOSE SERPL-MCNC: 114 MG/DL (ref 74–99)
HCT VFR BLD AUTO: 41.9 % (ref 36–48)
HGB BLD-MCNC: 13.9 G/DL (ref 12–16)
IRON SATN MFR SERPL: 15 %
IRON SERPL-MCNC: 57 UG/DL (ref 50–175)
LYMPHOCYTES # BLD: 1.8 K/UL (ref 1.1–5.9)
LYMPHOCYTES NFR BLD: 17 % (ref 14–44)
MCH RBC QN AUTO: 27.9 PG (ref 25–35)
MCHC RBC AUTO-ENTMCNC: 33.2 G/DL (ref 31–37)
MCV RBC AUTO: 84.1 FL (ref 78–102)
NEUTS SEG # BLD: 8 K/UL (ref 1.8–9.5)
NEUTS SEG NFR BLD: 76 % (ref 40–70)
PLATELET # BLD AUTO: 218 K/UL (ref 140–440)
POTASSIUM SERPL-SCNC: 3.4 MMOL/L (ref 3.5–5.5)
PROT SERPL-MCNC: 7.3 G/DL (ref 6.4–8.2)
RBC # BLD AUTO: 4.98 M/UL (ref 4.1–5.1)
SODIUM SERPL-SCNC: 141 MMOL/L (ref 136–145)
TIBC SERPL-MCNC: 380 UG/DL (ref 250–450)
WBC # BLD AUTO: 10.6 K/UL (ref 4.5–13)

## 2019-03-11 PROCEDURE — 80053 COMPREHEN METABOLIC PANEL: CPT

## 2019-03-11 PROCEDURE — 82728 ASSAY OF FERRITIN: CPT

## 2019-03-11 PROCEDURE — 83540 ASSAY OF IRON: CPT

## 2019-03-11 RX ORDER — OLMESARTAN MEDOXOMIL 40 MG/1
TABLET ORAL
Qty: 90 TAB | Refills: 3 | Status: SHIPPED | OUTPATIENT
Start: 2019-03-11 | End: 2019-04-16 | Stop reason: ALTCHOICE

## 2019-03-11 RX ORDER — HYDROCHLOROTHIAZIDE 25 MG/1
25 TABLET ORAL DAILY
Qty: 30 TAB | Refills: 3 | Status: SHIPPED | OUTPATIENT
Start: 2019-03-11 | End: 2019-03-11 | Stop reason: SDUPTHER

## 2019-03-11 NOTE — TELEPHONE ENCOUNTER
Please let patient know that the urologist from the conversation would like him to take HCTZ 25 mg daily. Previously he has been on a combination of losartan and HCTZ. He will thus receive2 separate pills. Instead of the losartan I have sent in [address] [place] [address]. He is to take 40 mg daily. He should also take HCTZ 25 mg daily. I will follow-up with him at next visit.   Tono Wright MD

## 2019-03-11 NOTE — PROGRESS NOTES
Hematology/Oncology  Progress Note    Name: Pita Blackburn  Date: 3/11/2019  : 1955    PCP: Reva Ortiz MD     Mr. Martha Barajas is a 61 y.o. man with a history of polycythemia/erythrocytosis. He received therapeutic phlebotomy whenever his hematocrit exceeds 45%. Current therapy: Therapeutic phlebotomy as needed when the hematocrit exceeds 45%. Subjective:     Mr. Martha Barajas is a 72-year-old man with erythrocytosis/polycythemia. He is doing well at this time. He has no physical complaints to report. He denies having any shortness of breath, headache, blurred vision, or abdominal pain. He reports that his appetite is good and his activity level is excellent. Past medical history, family history, and social history: these were reviewed and remains unchanged.     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     Nephrolithiasis 2012    Recurrent UTI     Sleep apnea     Urinary retention 2012     Past Surgical History:   Procedure Laterality Date    BIOPSY PROSTATE  2002    HX LAP CHOLECYSTECTOMY  2013    HX UROLOGICAL  10/11/2017    S/p cysto, PVP (ProTouch laser), Cystolitholapaxy     [facility] COLONOSCOPY FLX DX W/COLLJ SPEC WHEN PFRMD       Social History     Socioeconomic History    Marital status:      Spouse name: Not on file    Number of children: Not on file    Years of education: Not on file    Highest education level: Not on file   Social Needs    Financial resource strain: Not on file    Food insecurity - worry: Not on file    Food insecurity - inability: Not on file   [employer] needs - medical: Not on file   [employer] needs - non-medical: Not on file   Occupational History    Not on file   Tobacco Use    Smoking status: Never Smoker    Smokeless tobacco: Never Used   Substance and Sexual Activity    Alcohol use: No     Comment: Occasionally    Drug use: No    Sexual activity: Yes   Other Topics Concern    Not on file   Social History Narrative    Not on file     Family History   Problem Relation Age of Onset    Cancer Mother     Hypertension Mother     Heart Disease Father     Diabetes Brother      Current Outpatient Medications   Medication Sig Dispense Refill    olmesartan (BENICAR) 40 mg tablet TAKE 1 TABLET BY MOUTH DAILY 90 Tab 3    hydroCHLOROthiazide (HYDRODIURIL) 25 mg tablet Take 1 Tab by mouth daily. 30 Tab 3    nitrofurantoin, macrocrystal-monohydrate, (MACROBID) 100 mg capsule Take 1 Cap by mouth as needed (Take 1 Cap by mouth as needed). 90 Cap 0    acetaminophen (TYLENOL) 500 mg tablet Take  by mouth every six (6) hours as needed for Pain.  cholecalciferol (VITAMIN D3) 1,000 unit cap Take  by mouth daily.  amLODIPine (NORVASC) 5 mg tablet Take 1 Tab by mouth daily. 90 Tab 1    linezolid (ZYVOX) 600 mg tablet Take 1 Tab by mouth two (2) times a day. 14 Tab 0    nitrofurantoin (MACRODANTIN) 100 mg capsule Take 1 Cap by mouth two (2) times a day. 14 Cap 0    aspirin (ASPIRIN) 325 mg tablet 162 mg.      ascorbic acid, vitamin C, (VITAMIN C) 250 mg tablet Take  by mouth.  cyanocobalamin (VITAMIN B-12) 1,000 mcg tablet Take 1,000 mcg by mouth daily.  multivitamin (ONE A DAY) tablet Take 1 Tab by mouth daily. Review of Systems  Constitutional: The patient has no acute distress or discomfort. HEENT: The patient denies recent head trauma, eye pain, blurred vision,  hearing deficit, oropharyngeal mucosal pain or lesions, and the patient denies throat pain or discomfort. Lymphatics: The patient denies palpable peripheral lymphadenopathy. Hematologic: The patient denies having bruising, bleeding, or progressive fatigue.   Respiratory: Patient denies having shortness of breath, cough, sputum production, fever, or dyspnea on exertion. Cardiovascular: The patient denies having leg pain, leg swelling, heart palpitations, chest permit, chest pain, or lightheadedness. The patient denies having dyspnea on exertion. Gastrointestinal: The patient denies having nausea, emesis, or diarrhea. The patient denies having any hematemesis or blood in the stool. Genitourinary: Patient denies having urinary urgency, frequency, or dysuria. The patient denies having blood in the urine. Psychological: The patient denies having symptoms of nervousness, anxiety, depression, or thoughts of harming self. Skin: Patient denies having skin rashes, skin, ulcerations, or unexplained itching or pruritus. Musculoskeletal: The patient denies having pain in the joints or bones. Objective:     Visit Vitals  /75   Pulse 81   Temp 97.1 °F (36.2 °C) (Oral)   Resp 18   Ht 6' (1.829 m)   Wt 107 kg (236 lb)   SpO2 99%   BMI 32.01 kg/m²     ECOG PS=0  Physical Exam:   Gen. Appearance: The patient is in no acute distress. Skin: There is no bruise or rash. HEENT: The exam is unremarkable. Neck: Supple without lymphadenopathy or thyromegaly. Lungs: Clear to auscultation and percussion; there are no wheezes or rhonchi. Heart: Regular rate and rhythm; there are no murmurs, gallops, or rubs. Abdomen: Bowel sounds are present and normal.  There is no guarding, tenderness, or hepatosplenomegaly. Extremities: There is no clubbing, cyanosis, or edema. Neurologic: There are no focal neurologic deficits. Lymphatics: There is no palpable peripheral lymphadenopathy. Musculoskeletal: The patient has full range of motion at all joints. There is no evidence of joint deformity or effusions. There is no focal joint tenderness. Psychological/psychiatric: There is no clinical evidence of anxiety, depression, or melancholy.     Lab data:      Results for orders placed or performed during the hospital encounter of 03/11/19   CBC WITH 3 PART DIFF     Status: Abnormal Result Value Ref Range Status    WBC 10.6 4.5 - 13.0 K/uL Final    RBC 4.98 4.10 - 5.10 M/uL Final    HGB 13.9 12.0 - 16 g/dL Final    HCT 41.9 36 - 48 % Final    MCV 84.1 78 - 102 FL Final    MCH 27.9 25.0 - 35.0 PG Final    MCHC 33.2 31 - 37 g/dL Final    RDW 14.4 11.5 - 14.5 % Final    PLATELET 385 005 - 954 K/uL Final    NEUTROPHILS 76 (H) 40 - 70 % Final    MIXED CELLS 7 0.1 - 17 % Final    LYMPHOCYTES 17 14 - 44 % Final    ABS. NEUTROPHILS 8.0 1.8 - 9.5 K/UL Final    ABS. MIXED CELLS 0.8 0.0 - 2.3 K/uL Final    ABS. LYMPHOCYTES 1.8 1.1 - 5.9 K/UL Final     Comment: Test performed at [address] [place] [address] or Outpatient [facility]fusion [facility] Location. Reviewed by Medical Director. DF AUTOMATED   Final           Assessment:     1. Erythrocytosis    2. Polycythemia        Plan:   Polycythemia/erythrocytosis: Comprehensive metabolic panel will be obtained. The current CBC shows a hemoglobin of 13.9 g/dL with hematocrit of 41.9 %. The patient was advised that he will not need therapeutic phlebotomy this month. Phlebotomy will began when his HCT exceeds  45% and above and will continue weekly until his hematocrit is below 42%. Follow-up in 3 months  Orders Placed This Encounter    COMPLETE CBC & AUTO DIFF WBC    InHouse CBC ([employer])     Standing Status:   Future     Number of Occurrences:   1     Standing Expiration Date:   3/18/2019    IRON PROFILE     Standing Status:   Future     Standing Expiration Date:   9/23/2786    METABOLIC PANEL, COMPREHENSIVE     Standing Status:   Future     Standing Expiration Date:   3/11/2020    FERRITIN     Standing Status:   Future     Standing Expiration Date:   3/11/2020       Benito Partida NP  3/11/2019     I have assessed the patient independently and  agree with the full assessment as outlined. Rodríguez Barbour MD, FACP      Please note: This document has been produced using voice recognition software.   Unrecognized errors in transcription may be present.

## 2019-03-12 ENCOUNTER — TELEPHONE (OUTPATIENT)
Dept: FAMILY MEDICINE CLINIC | Age: 64
End: 2019-03-12

## 2019-03-12 RX ORDER — HYDROCHLOROTHIAZIDE 25 MG/1
TABLET ORAL
Qty: 90 TAB | Refills: 3 | Status: SHIPPED | OUTPATIENT
Start: 2019-03-12 | End: 2020-04-05

## 2019-03-12 NOTE — TELEPHONE ENCOUNTER
Patient called stating he was prescribed new meds and wasn't sure if he needed to continue taking the old meds as well. Please advise.    The meds in question are:  Amlodipine 5 mg   linezoid 600 mg

## 2019-03-18 NOTE — TELEPHONE ENCOUNTER
Tried to reach patient and informed patient he should continue the medications at this time.  Voicemail was left

## 2019-04-12 ENCOUNTER — TELEPHONE (OUTPATIENT)
Dept: FAMILY MEDICINE CLINIC | Age: 64
End: 2019-04-12

## 2019-04-12 NOTE — TELEPHONE ENCOUNTER
Xavier Snider is calling from [address] would like to request a change of medication from Benicar 40mg to  Stated they have the one medication in stock which is HCTZ , not sure why the medication was combine.  Please contact Mere [phone]  For any questions or clarification of this matter

## 2019-04-16 DIAGNOSIS — I10 ESSENTIAL HYPERTENSION: ICD-10-CM

## 2019-04-16 DIAGNOSIS — I10 ESSENTIAL HYPERTENSION: Primary | ICD-10-CM

## 2019-04-16 RX ORDER — CANDESARTAN 32 MG/1
32 TABLET ORAL DAILY
Qty: 30 TAB | Refills: 2 | Status: SHIPPED | OUTPATIENT
Start: 2019-04-16 | End: 2019-04-16 | Stop reason: SDUPTHER

## 2019-04-16 NOTE — TELEPHONE ENCOUNTER
Spoke with pharmacy at this time and pharmacy states the Benicar 40mg is on back order and will be available on 05/13/2019. They do have Benicar 20mg but she not for sure if the insurance will cover it for 2tabs. Please advise. Patient is our of medication.

## 2019-04-17 RX ORDER — CANDESARTAN 32 MG/1
TABLET ORAL
Qty: 90 TAB | Refills: 2 | Status: SHIPPED | OUTPATIENT
Start: 2019-04-17 | End: 2020-07-06

## 2019-06-17 ENCOUNTER — HOSPITAL ENCOUNTER (OUTPATIENT)
Dept: ONCOLOGY | Age: 64
Discharge: HOME | End: 2019-06-17

## 2019-06-17 ENCOUNTER — OFFICE VISIT (OUTPATIENT)
Dept: ONCOLOGY | Age: 64
End: 2019-06-17

## 2019-06-17 ENCOUNTER — HOSPITAL ENCOUNTER (OUTPATIENT)
Dept: LAB | Age: 64
Discharge: HOME | End: 2019-06-17
Payer: COMMERCIAL

## 2019-06-17 VITALS
DIASTOLIC BLOOD PRESSURE: 82 MMHG | TEMPERATURE: 97.3 F | HEART RATE: 66 BPM | HEIGHT: 72 IN | OXYGEN SATURATION: 98 % | WEIGHT: 231 LBS | RESPIRATION RATE: 16 BRPM | BODY MASS INDEX: 31.29 KG/M2 | SYSTOLIC BLOOD PRESSURE: 145 MMHG

## 2019-06-17 DIAGNOSIS — D75.1 ERYTHROCYTOSIS: Primary | ICD-10-CM

## 2019-06-17 DIAGNOSIS — D75.1 POLYCYTHEMIA: ICD-10-CM

## 2019-06-17 DIAGNOSIS — D75.1 ERYTHROCYTOSIS: ICD-10-CM

## 2019-06-17 LAB
ALBUMIN SERPL-MCNC: 3.8 G/DL (ref 3.4–5)
ALBUMIN/GLOB SERPL: 1.3 {RATIO} (ref 0.8–1.7)
ALP SERPL-CCNC: 63 U/L (ref 45–117)
ALT SERPL-CCNC: 31 U/L (ref 16–61)
ANION GAP SERPL CALC-SCNC: 7 MMOL/L (ref 3–18)
AST SERPL-CCNC: 19 U/L (ref 15–37)
BASO+EOS+MONOS # BLD AUTO: 0.9 K/UL (ref 0–2.3)
BASO+EOS+MONOS NFR BLD AUTO: 11 % (ref 0.1–17)
BILIRUB SERPL-MCNC: 0.5 MG/DL (ref 0.2–1)
BUN SERPL-MCNC: 24 MG/DL (ref 7–18)
BUN/CREAT SERPL: 22 (ref 12–20)
CALCIUM SERPL-MCNC: 8.9 MG/DL (ref 8.5–10.1)
CHLORIDE SERPL-SCNC: 106 MMOL/L (ref 100–108)
CO2 SERPL-SCNC: 28 MMOL/L (ref 21–32)
CREAT SERPL-MCNC: 1.07 MG/DL (ref 0.6–1.3)
DIFFERENTIAL METHOD BLD: NORMAL
ERYTHROCYTE [DISTWIDTH] IN BLOOD BY AUTOMATED COUNT: 14.1 % (ref 11.5–14.5)
FERRITIN SERPL-MCNC: 10 NG/ML (ref 8–388)
GLOBULIN SER CALC-MCNC: 3 G/DL (ref 2–4)
GLUCOSE SERPL-MCNC: 95 MG/DL (ref 74–99)
HCT VFR BLD AUTO: 41.5 % (ref 36–48)
HGB BLD-MCNC: 13.5 G/DL (ref 12–16)
IRON SATN MFR SERPL: 14 %
IRON SERPL-MCNC: 47 UG/DL (ref 50–175)
LYMPHOCYTES # BLD: 1.9 K/UL (ref 1.1–5.9)
LYMPHOCYTES NFR BLD: 23 % (ref 14–44)
MCH RBC QN AUTO: 27.2 PG (ref 25–35)
MCHC RBC AUTO-ENTMCNC: 32.5 G/DL (ref 31–37)
MCV RBC AUTO: 83.7 FL (ref 78–102)
NEUTS SEG # BLD: 5.4 K/UL (ref 1.8–9.5)
NEUTS SEG NFR BLD: 66 % (ref 40–70)
PLATELET # BLD AUTO: 197 K/UL (ref 140–440)
POTASSIUM SERPL-SCNC: 4.1 MMOL/L (ref 3.5–5.5)
PROT SERPL-MCNC: 6.8 G/DL (ref 6.4–8.2)
RBC # BLD AUTO: 4.96 M/UL (ref 4.1–5.1)
SODIUM SERPL-SCNC: 141 MMOL/L (ref 136–145)
TIBC SERPL-MCNC: 343 UG/DL (ref 250–450)
WBC # BLD AUTO: 8.2 K/UL (ref 4.5–13)

## 2019-06-17 PROCEDURE — 80053 COMPREHEN METABOLIC PANEL: CPT

## 2019-06-17 PROCEDURE — 36415 COLL VENOUS BLD VENIPUNCTURE: CPT

## 2019-06-17 PROCEDURE — 82728 ASSAY OF FERRITIN: CPT

## 2019-06-17 PROCEDURE — 83540 ASSAY OF IRON: CPT

## 2019-06-17 NOTE — PATIENT INSTRUCTIONS
Polycythemia: Care Instructions  Your Care Instructions    Polycythemia (say \"paw-leonor-sy-THEE-jane-uh) is an abnormal increase in red blood cells. It happens when the tissue inside your bones (bone marrow) makes too much blood. It also can occur if your blood does not have enough liquid, or plasma. This can make the number of red blood cells seem higher than normal. The extra red blood cells make your blood thicker than normal. This may raise your risk for blood clots that can cause heart attacks or strokes. Clots can form in the deep veins of the body, a condition called deep vein thrombosis. Or, a clot can travel through the blood to a lung (a pulmonary embolism). Your doctor may treat you by taking out some of your blood (phlebotomy). The process is like donating blood. Your doctor may even recommend that you donate blood. You may take pills to stop your body from making red blood cells. You also will get treatment for any other conditions that may cause your body to make too many red blood cells. Follow-up care is a key part of your treatment and safety. Be sure to make and go to all appointments, and call your doctor if you are having problems. It's also a good idea to know your test results and keep a list of the medicines you take. How can you care for yourself at home? · Be safe with medicines. Take your medicines exactly as prescribed. Call your doctor if you think you are having a problem with your medicine. · Drink plenty of fluids, enough so that your urine is light yellow or clear like water, before and after you have blood removed. If you have kidney, heart, or liver disease and have to limit fluids, talk with your doctor before you increase the amount of fluids you drink. · Take it easy after you have had blood removed. Do not do vigorous exercise. · If your doctor recommends aspirin, take it exactly as prescribed.  Call your doctor if you think you are having a problem with your medicine. · Do not smoke. Smoking increases the risk of blood clots and may reduce the amount of oxygen in your blood. If you need help quitting, talk to your doctor about stop-smoking programs and medicines. These can increase your chances of quitting for good. · Take an antihistamine, such as a nondrowsy one like loratadine (Claritin) or one that might make you sleepy like diphenhydramine (Benadryl), if your skin is itchy. Some people who have this condition have itching. · Wear medical alert jewelry that lists your clotting problem. You can buy this at most [facility]. When should you call for help? Call 911 anytime you think you may need emergency care. For example, call if:    · You have sudden chest pain and shortness of breath, or you cough up blood.     · You have symptoms of a stroke. These may include:  ? Sudden numbness, tingling, weakness, or loss of movement in your face, arm, or leg, especially on only one side of your body. ? Sudden vision changes. ? Sudden trouble speaking. ? Sudden confusion or trouble understanding simple statements. ? Sudden problems with walking or balance. ? A sudden, severe headache that is different from past headaches.     · You have symptoms of a heart attack. These may include:  ? Chest pain or pressure, or a strange feeling in the chest.  ? Sweating. ? Shortness of breath. ? Nausea or vomiting. ? Pain, pressure, or a strange feeling in the back, neck, jaw, or upper belly or in one or both shoulders or arms. ? Lightheadedness or sudden weakness. ? A fast or irregular heartbeat. After you call 911, the  may tell you to chew 1 adult-strength or 2 to 4 low-dose aspirin. Wait for an ambulance. Do not try to drive yourself.    Call your doctor now or seek immediate medical care if:    · You have signs of a blood clot, such as:  ? Pain in your calf, back of knee, thigh, or groin. ?  Redness and swelling in your leg or groin.    Watch closely for changes in your health, and be sure to contact your doctor if you have any problems. Where can you learn more? Go to http://amaya-pepito.info/. Enter P642 in the search box to learn more about \"Polycythemia: Care Instructions. \"  Current as of: May 6, 2018  Content Version: 11.9  © 8685-9836 [employer], [employer]. Care instructions adapted under license by [employer] (which disclaims liability or warranty for this information). If you have questions about a medical condition or this instruction, always ask your healthcare professional. [address] any warranty or liability for your use of this information.

## 2019-06-17 NOTE — PROGRESS NOTES
Hematology/Oncology  Progress Note    Name: Gabo Cruz  Date: 2019  : 1955    PCP: Nilay Pichardo MD     Mr. Ramsey Roque is a 59 y.o. man with a history of polycythemia/erythrocytosis. He received therapeutic phlebotomy whenever his hematocrit exceeds 45%. Current therapy: Therapeutic phlebotomy as needed when the hematocrit exceeds 45%. Subjective:     Mr. Ramsey Roque is a 71-year-old man with erythrocytosis/polycythemia. He is doing well at this time. He has no physical complaints to report. He denies having any shortness of breath, headache, blurred vision, or abdominal pain. He reports that his appetite is good and his activity level is excellent. Past medical history, family history, and social history: these were reviewed and remains unchanged. 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     Nephrolithiasis 2012    Recurrent UTI     Sleep apnea     Urinary retention 2012     Past Surgical History:   Procedure Laterality Date    BIOPSY PROSTATE  2002    HX LAP CHOLECYSTECTOMY  2013    HX UROLOGICAL  10/11/2017    S/p cysto, PVP (ProTouch laser), Cystolitholapaxy     ID COLONOSCOPY FLX DX W/COLLJ SPEC WHEN PFRMD       Social History     Socioeconomic History    Marital status:      Spouse name: Not on file    Number of children: Not on file    Years of education: Not on file    Highest education level: Not on file   Occupational History    Not on file   Social Needs    Financial resource strain: Not on file    Food insecurity:     Worry: Not on file     Inability: Not on file    Transportation needs:     Medical: Not on file     Non-medical: Not on file   Tobacco Use    Smoking status: Never Smoker    Smokeless tobacco: Never Used   Substance and Sexual Activity    Alcohol use:  No Comment: Occasionally    Drug use: No    Sexual activity: Yes   Lifestyle    Physical activity:     Days per week: Not on file     Minutes per session: Not on file    Stress: Not on file   Relationships    Social connections:     Talks on phone: Not on file     Gets together: Not on file     Attends Orthodoxy service: Not on file     Active member of club or organization: Not on file     Attends meetings of clubs or organizations: Not on file     Relationship status: Not on file    Intimate partner violence:     Fear of current or ex partner: Not on file     Emotionally abused: Not on file     Physically abused: Not on file     Forced sexual activity: Not on file   Other Topics Concern    Not on file   Social History Narrative    Not on file     Family History   Problem Relation Age of Onset    Cancer Mother     Hypertension Mother     Heart Disease Father     Diabetes Brother      Current Outpatient Medications   Medication Sig Dispense Refill    candesartan (ATACAND) 32 mg tablet TAKE 1 TABLET BY MOUTH DAILY 90 Tab 2    hydroCHLOROthiazide (HYDRODIURIL) 25 mg tablet TAKE 1 TABLET BY MOUTH DAILY 90 Tab 3    nitrofurantoin, macrocrystal-monohydrate, (MACROBID) 100 mg capsule Take 1 Cap by mouth as needed (Take 1 Cap by mouth as needed). 90 Cap 0    acetaminophen (TYLENOL) 500 mg tablet Take  by mouth every six (6) hours as needed for Pain.  cholecalciferol (VITAMIN D3) 1,000 unit cap Take  by mouth daily.  nitrofurantoin (MACRODANTIN) 100 mg capsule Take 1 Cap by mouth two (2) times a day. 14 Cap 0    aspirin (ASPIRIN) 325 mg tablet 162 mg.      ascorbic acid, vitamin C, (VITAMIN C) 250 mg tablet Take  by mouth.  cyanocobalamin (VITAMIN B-12) 1,000 mcg tablet Take 1,000 mcg by mouth daily.  multivitamin (ONE A DAY) tablet Take 1 Tab by mouth daily. Review of Systems  Constitutional: The patient has no acute distress or discomfort.   HEENT: The patient denies recent head trauma, eye pain, blurred vision,  hearing deficit, oropharyngeal mucosal pain or lesions, and the patient denies throat pain or discomfort. Lymphatics: The patient denies palpable peripheral lymphadenopathy. Hematologic: The patient denies having bruising, bleeding, or progressive fatigue. Respiratory: Patient denies having shortness of breath, cough, sputum production, fever, or dyspnea on exertion. Cardiovascular: The patient denies having leg pain, leg swelling, heart palpitations, chest permit, chest pain, or lightheadedness. The patient denies having dyspnea on exertion. Gastrointestinal: The patient denies having nausea, emesis, or diarrhea. The patient denies having any hematemesis or blood in the stool. Genitourinary: Patient denies having urinary urgency, frequency, or dysuria. The patient denies having blood in the urine. Psychological: The patient denies having symptoms of nervousness, anxiety, depression, or thoughts of harming self. Skin: Patient denies having skin rashes, skin, ulcerations, or unexplained itching or pruritus. Musculoskeletal: The patient denies having pain in the joints or bones. Objective:     Visit Vitals  /82   Pulse 66   Temp 97.3 °F (36.3 °C) (Oral)   Resp 16   Ht 6' (1.829 m)   Wt 104.8 kg (231 lb)   SpO2 98%   BMI 31.33 kg/m²     ECOG PS=0  Physical Exam:   Gen. Appearance: The patient is in no acute distress. Skin: There is no bruise or rash. HEENT: The exam is unremarkable. Neck: Supple without lymphadenopathy or thyromegaly. Lungs: Clear to auscultation and percussion; there are no wheezes or rhonchi. Heart: Regular rate and rhythm; there are no murmurs, gallops, or rubs. Abdomen: Bowel sounds are present and normal.  There is no guarding, tenderness, or hepatosplenomegaly. Extremities: There is no clubbing, cyanosis, or edema. Neurologic: There are no focal neurologic deficits. Lymphatics: There is no palpable peripheral lymphadenopathy. Musculoskeletal: The patient has full range of motion at all joints. There is no evidence of joint deformity or effusions. There is no focal joint tenderness. Psychological/psychiatric: There is no clinical evidence of anxiety, depression, or melancholy. Lab data:      Results for orders placed or performed during the hospital encounter of 06/17/19   CBC WITH 3 PART DIFF     Status: None   Result Value Ref Range Status    WBC 8.2 4.5 - 13.0 K/uL Final    RBC 4.96 4.10 - 5.10 M/uL Final    HGB 13.5 12.0 - 16 g/dL Final    HCT 41.5 36 - 48 % Final    MCV 83.7 78 - 102 FL Final    MCH 27.2 25.0 - 35.0 PG Final    MCHC 32.5 31 - 37 g/dL Final    RDW 14.1 11.5 - 14.5 % Final    PLATELET 885 840 - 478 K/uL Final    NEUTROPHILS 66 40 - 70 % Final    MIXED CELLS 11 0.1 - 17 % Final    LYMPHOCYTES 23 14 - 44 % Final    ABS. NEUTROPHILS 5.4 1.8 - 9.5 K/UL Final    ABS. MIXED CELLS 0.9 0.0 - 2.3 K/uL Final    ABS. LYMPHOCYTES 1.9 1.1 - 5.9 K/UL Final     Comment: Test performed at [address] [place] [address] or Outpatient [facility]fusion [facility] Location. Reviewed by Medical Director. DF AUTOMATED   Final           Assessment:     1. Erythrocytosis    2. Polycythemia        Plan:   Polycythemia/erythrocytosis: Comprehensive metabolic panel will be obtained. The current CBC shows a hemoglobin of 13.5 g/dL with hematocrit of 41.5 %. I have recommended to patient that he does not need therapeutic phlebotomy this month. Phlebotomy will began when his HCT exceeds  45% and above and will continue weekly until his hematocrit is below 42%.     Follow-up in 3 months  Orders Placed This Encounter    COMPLETE CBC & AUTO DIFF WBC    InHouse CBC ([email or web address])     Standing Status:   Future     Number of Occurrences:   1     Standing Expiration Date:   5/19/9031    METABOLIC PANEL, COMPREHENSIVE     Standing Status:   Future     Standing Expiration Date:   6/17/2020    FERRITIN     Standing Status:   Future Standing Expiration Date:   6/17/2020    IRON PROFILE     Standing Status:   Future     Standing Expiration Date:   6/17/2020       Mounika Sousa NP  6/17/2019     I have assessed the patient independently and  agree with the full assessment as outlined. Luly Valencia MD, FACP      Please note: This document has been produced using voice recognition software. Unrecognized errors in transcription may be present.

## 2019-09-16 ENCOUNTER — OFFICE VISIT (OUTPATIENT)
Dept: ONCOLOGY | Age: 64
End: 2019-09-16

## 2019-09-16 ENCOUNTER — HOSPITAL ENCOUNTER (OUTPATIENT)
Dept: ONCOLOGY | Age: 64
Discharge: HOME | End: 2019-09-16

## 2019-09-16 VITALS
RESPIRATION RATE: 18 BRPM | HEIGHT: 72 IN | BODY MASS INDEX: 31.56 KG/M2 | SYSTOLIC BLOOD PRESSURE: 160 MMHG | OXYGEN SATURATION: 100 % | DIASTOLIC BLOOD PRESSURE: 84 MMHG | WEIGHT: 233 LBS | HEART RATE: 72 BPM | TEMPERATURE: 97.6 F

## 2019-09-16 DIAGNOSIS — D75.1 POLYCYTHEMIA: ICD-10-CM

## 2019-09-16 DIAGNOSIS — R77.9 ELEVATED BLOOD PROTEIN: ICD-10-CM

## 2019-09-16 DIAGNOSIS — D75.1 POLYCYTHEMIA: Primary | ICD-10-CM

## 2019-09-16 DIAGNOSIS — D75.1 ERYTHROCYTOSIS: ICD-10-CM

## 2019-09-16 LAB
BASO+EOS+MONOS # BLD AUTO: 0.8 K/UL (ref 0–2.3)
BASO+EOS+MONOS NFR BLD AUTO: 9 % (ref 0.1–17)
DIFFERENTIAL METHOD BLD: ABNORMAL
ERYTHROCYTE [DISTWIDTH] IN BLOOD BY AUTOMATED COUNT: 14.1 % (ref 11.5–14.5)
HCT VFR BLD AUTO: 44.9 % (ref 36–48)
HGB BLD-MCNC: 14.4 G/DL (ref 12–16)
LYMPHOCYTES # BLD: 2.5 K/UL (ref 1.1–5.9)
LYMPHOCYTES NFR BLD: 28 % (ref 14–44)
MCH RBC QN AUTO: 26.8 PG (ref 25–35)
MCHC RBC AUTO-ENTMCNC: 32.1 G/DL (ref 31–37)
MCV RBC AUTO: 83.6 FL (ref 78–102)
NEUTS SEG # BLD: 5.5 K/UL (ref 1.8–9.5)
NEUTS SEG NFR BLD: 63 % (ref 40–70)
PLATELET # BLD AUTO: 204 K/UL (ref 140–440)
RBC # BLD AUTO: 5.37 M/UL (ref 4.1–5.1)
WBC # BLD AUTO: 8.8 K/UL (ref 4.5–13)

## 2019-09-16 NOTE — PATIENT INSTRUCTIONS
Polycythemia: Care Instructions  Your Care Instructions    Polycythemia (say \"paw-leonor-sy-THEE-jane-uh) is an abnormal increase in red blood cells. It happens when the tissue inside your bones (bone marrow) makes too much blood. It also can occur if your blood does not have enough liquid, or plasma. This can make the number of red blood cells seem higher than normal. The extra red blood cells make your blood thicker than normal. This may raise your risk for blood clots that can cause heart attacks or strokes. Clots can form in the deep veins of the body, a condition called deep vein thrombosis. Or, a clot can travel through the blood to a lung (a pulmonary embolism). Your doctor may treat you by taking out some of your blood (phlebotomy). The process is like donating blood. Your doctor may even recommend that you donate blood. You may take pills to stop your body from making red blood cells. You also will get treatment for any other conditions that may cause your body to make too many red blood cells. Follow-up care is a key part of your treatment and safety. Be sure to make and go to all appointments, and call your doctor if you are having problems. It's also a good idea to know your test results and keep a list of the medicines you take. How can you care for yourself at home? · Be safe with medicines. Take your medicines exactly as prescribed. Call your doctor if you think you are having a problem with your medicine. · Drink plenty of fluids, enough so that your urine is light yellow or clear like water, before and after you have blood removed. If you have kidney, heart, or liver disease and have to limit fluids, talk with your doctor before you increase the amount of fluids you drink. · Take it easy after you have had blood removed. Do not do vigorous exercise. · If your doctor recommends aspirin, take it exactly as prescribed.  Call your doctor if you think you are having a problem with your medicine. · Do not smoke. Smoking increases the risk of blood clots and may reduce the amount of oxygen in your blood. If you need help quitting, talk to your doctor about stop-smoking programs and medicines. These can increase your chances of quitting for good. · Take an antihistamine, such as a nondrowsy one like loratadine (Claritin) or one that might make you sleepy like diphenhydramine (Benadryl), if your skin is itchy. Some people who have this condition have itching. · Wear medical alert jewelry that lists your clotting problem. You can buy this at most [employer]. When should you call for help? Call 911 anytime you think you may need emergency care. For example, call if:    · You have sudden chest pain and shortness of breath, or you cough up blood.     · You have symptoms of a stroke. These may include:  ? Sudden numbness, tingling, weakness, or loss of movement in your face, arm, or leg, especially on only one side of your body. ? Sudden vision changes. ? Sudden trouble speaking. ? Sudden confusion or trouble understanding simple statements. ? Sudden problems with walking or balance. ? A sudden, severe headache that is different from past headaches.     · You have symptoms of a heart attack. These may include:  ? Chest pain or pressure, or a strange feeling in the chest.  ? Sweating. ? Shortness of breath. ? Nausea or vomiting. ? Pain, pressure, or a strange feeling in the back, neck, jaw, or upper belly or in one or both shoulders or arms. ? Lightheadedness or sudden weakness. ? A fast or irregular heartbeat. After you call 911, the  may tell you to chew 1 adult-strength or 2 to 4 low-dose aspirin. Wait for an ambulance. Do not try to drive yourself.    Call your doctor now or seek immediate medical care if:    · You have signs of a blood clot, such as:  ? Pain in your calf, back of knee, thigh, or groin. ?  Redness and swelling in your leg or groin.    Watch closely for changes in your health, and be sure to contact your doctor if you have any problems. Where can you learn more? Go to http://amaya-pepito.info/. Enter C362 in the search box to learn more about \"Polycythemia: Care Instructions. \"  Current as of: March 28, 2019  Content Version: 12.1  © 9684-0173 [employer], [employer]. Care instructions adapted under license by [employer] (which disclaims liability or warranty for this information). If you have questions about a medical condition or this instruction, always ask your healthcare professional. [address] any warranty or liability for your use of this information.

## 2019-09-16 NOTE — PROGRESS NOTES
Hematology/Oncology  Progress Note    Name: Saad Daniels  Date: 2019  : 1955    PCP: Laura Green MD     Mr. Shellie Andrade is a 59 y.o. man with a history of polycythemia/erythrocytosis. He received therapeutic phlebotomy whenever his hematocrit exceeds 45%. Current therapy: Therapeutic phlebotomy as needed when the hematocrit exceeds 45%. Subjective:     Mr. Shellie Andrade is a 63-year-old man with erythrocytosis/polycythemia. He is doing well at this time. He has no physical complaints to report. He denies having any shortness of breath, headache, blurred vision, or abdominal pain. He reports that his appetite is good and his activity level is excellent. Past medical history, family history, and social history: these were reviewed and remains unchanged. 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 Dr. Israel Darnell at [facility].     CO COLONOSCOPY FLX DX W/COLLJ SPEC WHEN PFRMD       Social History     Socioeconomic History    Marital status:      Spouse name: Not on file    Number of children: Not on file    Years of education: Not on file    Highest education level: Not on file   Occupational History    Not on file   Social Needs    Financial resource strain: Not on file    Food insecurity:     Worry: Not on file Inability: Not on file    Transportation needs:     Medical: Not on file     Non-medical: Not on file   Tobacco Use    Smoking status: Never Smoker    Smokeless tobacco: Never Used   Substance and Sexual Activity    Alcohol use: No     Comment: Occasionally    Drug use: No    Sexual activity: Yes   Lifestyle    Physical activity:     Days per week: Not on file     Minutes per session: Not on file    Stress: Not on file   Relationships    Social connections:     Talks on phone: Not on file     Gets together: Not on file     Attends Buddhism service: Not on file     Active member of club or organization: Not on file     Attends meetings of clubs or organizations: Not on file     Relationship status: Not on file    Intimate partner violence:     Fear of current or ex partner: Not on file     Emotionally abused: Not on file     Physically abused: Not on file     Forced sexual activity: Not on file   Other Topics Concern    Not on file   Social History Narrative    Not on file     Family History   Problem Relation Age of Onset    Cancer Mother     Hypertension Mother     Heart Disease Father     Diabetes Brother      Current Outpatient Medications   Medication Sig Dispense Refill    candesartan (ATACAND) 32 mg tablet TAKE 1 TABLET BY MOUTH DAILY 90 Tab 2    hydroCHLOROthiazide (HYDRODIURIL) 25 mg tablet TAKE 1 TABLET BY MOUTH DAILY 90 Tab 3    nitrofurantoin, macrocrystal-monohydrate, (MACROBID) 100 mg capsule Take 1 Cap by mouth as needed (Take 1 Cap by mouth as needed). 90 Cap 0    acetaminophen (TYLENOL) 500 mg tablet Take  by mouth every six (6) hours as needed for Pain.  cholecalciferol (VITAMIN D3) 1,000 unit cap Take  by mouth daily.  nitrofurantoin (MACRODANTIN) 100 mg capsule Take 1 Cap by mouth two (2) times a day. 14 Cap 0    aspirin (ASPIRIN) 325 mg tablet 162 mg.      ascorbic acid, vitamin C, (VITAMIN C) 250 mg tablet Take  by mouth.       cyanocobalamin (VITAMIN B-12) 1,000 mcg tablet Take 1,000 mcg by mouth daily.  multivitamin (ONE A DAY) tablet Take 1 Tab by mouth daily. Review of Systems  Constitutional: The patient has no acute distress or discomfort. HEENT: The patient denies recent head trauma, eye pain, blurred vision,  hearing deficit, oropharyngeal mucosal pain or lesions, and the patient denies throat pain or discomfort. Lymphatics: The patient denies palpable peripheral lymphadenopathy. Hematologic: The patient denies having bruising, bleeding, or progressive fatigue. Respiratory: Patient denies having shortness of breath, cough, sputum production, fever, or dyspnea on exertion. Cardiovascular: The patient denies having leg pain, leg swelling, heart palpitations, chest permit, chest pain, or lightheadedness. The patient denies having dyspnea on exertion. Gastrointestinal: The patient denies having nausea, emesis, or diarrhea. The patient denies having any hematemesis or blood in the stool. Genitourinary: Patient denies having urinary urgency, frequency, or dysuria. The patient denies having blood in the urine. Psychological: The patient denies having symptoms of nervousness, anxiety, depression, or thoughts of harming self. Skin: Patient denies having skin rashes, skin, ulcerations, or unexplained itching or pruritus. Musculoskeletal: The patient denies having pain in the joints or bones. Objective:     Visit Vitals  /84   Pulse 72   Temp 97.6 °F (36.4 °C) (Oral)   Resp 18   Ht 6' (1.829 m)   Wt 105.7 kg (233 lb)   SpO2 100%   BMI 31.60 kg/m²     ECOG PS=0  Physical Exam:   Gen. Appearance: The patient is in no acute distress. Skin: There is no bruise or rash. HEENT: The exam is unremarkable. Neck: Supple without lymphadenopathy or thyromegaly. Lungs: Clear to auscultation and percussion; there are no wheezes or rhonchi. Heart: Regular rate and rhythm; there are no murmurs, gallops, or rubs. Abdomen:  Bowel sounds are present and normal.  There is no guarding, tenderness, or hepatosplenomegaly. Extremities: There is no clubbing, cyanosis, or edema. Neurologic: There are no focal neurologic deficits. Lymphatics: There is no palpable peripheral lymphadenopathy. Musculoskeletal: The patient has full range of motion at all joints. There is no evidence of joint deformity or effusions. There is no focal joint tenderness. Psychological/psychiatric: There is no clinical evidence of anxiety, depression, or melancholy. Lab data:      Results for orders placed or performed during the hospital encounter of 06/17/19   CBC WITH 3 PART DIFF     Status: None   Result Value Ref Range Status    WBC 8.2 4.5 - 13.0 K/uL Final    RBC 4.96 4.10 - 5.10 M/uL Final    HGB 13.5 12.0 - 16 g/dL Final    HCT 41.5 36 - 48 % Final    MCV 83.7 78 - 102 FL Final    MCH 27.2 25.0 - 35.0 PG Final    MCHC 32.5 31 - 37 g/dL Final    RDW 14.1 11.5 - 14.5 % Final    PLATELET 939 301 - 502 K/uL Final    NEUTROPHILS 66 40 - 70 % Final    MIXED CELLS 11 0.1 - 17 % Final    LYMPHOCYTES 23 14 - 44 % Final    ABS. NEUTROPHILS 5.4 1.8 - 9.5 K/UL Final    ABS. MIXED CELLS 0.9 0.0 - 2.3 K/uL Final    ABS. LYMPHOCYTES 1.9 1.1 - 5.9 K/UL Final     Comment: Test performed at [address] [place] [address] or Outpatient Infusion Center Location. Reviewed by Medical Director. DF AUTOMATED   Final           Assessment:     1. Polycythemia    2. Erythrocytosis    3. Elevated blood protein        Plan:   Polycythemia/erythrocytosis: The CBC from today is pending. We will send the results from today's blood count to the infusion center. If his hematocrit is below 45% he will not need to receive phlebotomy this month. A comprehensive metabolic panel will be obtained. The most recent CBC from 8/14/2019 showed that his hemoglobin was 14.4 g/dL with hematocrit of 43.9%.    Phlebotomy will began when his HCT exceeds  45% and above and will continue weekly until his hematocrit is below 42%. Follow-up in 3 months  Orders Placed This Encounter    CBC WITH AUTOMATED DIFF     Standing Status:   Future     Number of Occurrences:   1     Standing Expiration Date:   8/42/8270    METABOLIC PANEL, COMPREHENSIVE     Standing Status:   Future     Number of Occurrences:   1     Standing Expiration Date:   9/16/2020    IRON PROFILE     Standing Status:   Future     Number of Occurrences:   1     Standing Expiration Date:   9/16/2020    FERRITIN     Standing Status:   Future     Number of Occurrences:   1     Standing Expiration Date:   9/16/2020    InHouse CBC ([facility])     Standing Status:   Future     Number of Occurrences:   1     Standing Expiration Date:   9/23/2019       Jack Taylor MD  9/16/2019     I have assessed the patient independently and  agree with the full assessment as outlined. Michelet Ortiz MD, FACP      Please note: This document has been produced using voice recognition software. Unrecognized errors in transcription may be present.

## 2019-09-17 LAB
ALBUMIN SERPL-MCNC: 4.8 G/DL (ref 3.6–4.8)
ALBUMIN/GLOB SERPL: 2.1 {RATIO} (ref 1.2–2.2)
ALP SERPL-CCNC: 62 IU/L (ref 39–117)
ALT SERPL-CCNC: 23 IU/L (ref 0–44)
AST SERPL-CCNC: 20 IU/L (ref 0–40)
BASOPHILS # BLD AUTO: NORMAL 10*3/UL
BILIRUB SERPL-MCNC: 0.4 MG/DL (ref 0–1.2)
BUN SERPL-MCNC: 23 MG/DL (ref 8–27)
BUN/CREAT SERPL: 21 (ref 10–24)
CALCIUM SERPL-MCNC: 9.7 MG/DL (ref 8.6–10.2)
CHLORIDE SERPL-SCNC: 100 MMOL/L (ref 96–106)
CO2 SERPL-SCNC: 25 MMOL/L (ref 20–29)
CREAT SERPL-MCNC: 1.12 MG/DL (ref 0.76–1.27)
EOSINOPHIL # BLD AUTO: NORMAL 10*3/UL
EOSINOPHIL NFR BLD AUTO: NORMAL %
FERRITIN SERPL-MCNC: 15 NG/ML (ref 30–400)
GLOBULIN SER CALC-MCNC: 2.3 G/DL (ref 1.5–4.5)
GLUCOSE SERPL-MCNC: 96 MG/DL (ref 65–99)
HCT VFR BLD AUTO: NORMAL %
HGB BLD-MCNC: NORMAL G/DL
IRON SATN MFR SERPL: 11 % (ref 15–55)
IRON SERPL-MCNC: 36 UG/DL (ref 38–169)
LYMPHOCYTES # BLD AUTO: NORMAL 10*3/UL
LYMPHOCYTES NFR BLD AUTO: NORMAL %
MONOCYTES NFR BLD AUTO: NORMAL %
NEUTROPHILS NFR BLD AUTO: NORMAL %
PLATELET # BLD AUTO: NORMAL 10*3/UL
POTASSIUM SERPL-SCNC: 4 MMOL/L (ref 3.5–5.2)
PROT SERPL-MCNC: 7.1 G/DL (ref 6–8.5)
RBC # BLD AUTO: NORMAL 10*6/UL
SODIUM SERPL-SCNC: 144 MMOL/L (ref 134–144)
TIBC SERPL-MCNC: 335 UG/DL (ref 250–450)
UIBC SERPL-MCNC: 299 UG/DL (ref 111–343)
WBC # BLD AUTO: NORMAL X10E3/UL

## 2019-11-12 PROBLEM — R33.8 BENIGN PROSTATIC HYPERPLASIA WITH URINARY RETENTION: Status: ACTIVE | Noted: 2017-10-11

## 2019-11-12 PROBLEM — N40.1 BENIGN PROSTATIC HYPERPLASIA WITH URINARY RETENTION: Status: ACTIVE | Noted: 2017-10-11

## 2019-12-16 ENCOUNTER — HOSPITAL ENCOUNTER (OUTPATIENT)
Dept: ONCOLOGY | Age: 64
Discharge: HOME | End: 2019-12-16

## 2019-12-16 ENCOUNTER — OFFICE VISIT (OUTPATIENT)
Dept: ONCOLOGY | Age: 64
End: 2019-12-16

## 2019-12-16 VITALS
OXYGEN SATURATION: 96 % | TEMPERATURE: 98.2 F | RESPIRATION RATE: 18 BRPM | WEIGHT: 237 LBS | BODY MASS INDEX: 31.41 KG/M2 | HEART RATE: 76 BPM | DIASTOLIC BLOOD PRESSURE: 78 MMHG | HEIGHT: 73 IN | SYSTOLIC BLOOD PRESSURE: 152 MMHG

## 2019-12-16 DIAGNOSIS — D75.1 ERYTHROCYTOSIS: ICD-10-CM

## 2019-12-16 DIAGNOSIS — D75.1 POLYCYTHEMIA: ICD-10-CM

## 2019-12-16 DIAGNOSIS — D75.1 POLYCYTHEMIA: Primary | ICD-10-CM

## 2019-12-16 LAB
BASO+EOS+MONOS # BLD AUTO: 0.7 K/UL (ref 0–2.3)
BASO+EOS+MONOS NFR BLD AUTO: 8 % (ref 0.1–17)
DIFFERENTIAL METHOD BLD: ABNORMAL
ERYTHROCYTE [DISTWIDTH] IN BLOOD BY AUTOMATED COUNT: 14.8 % (ref 11.5–14.5)
HCT VFR BLD AUTO: 43.6 % (ref 36–48)
HGB BLD-MCNC: 14.5 G/DL (ref 12–16)
LYMPHOCYTES # BLD: 2.3 K/UL (ref 1.1–5.9)
LYMPHOCYTES NFR BLD: 27 % (ref 14–44)
MCH RBC QN AUTO: 27.6 PG (ref 25–35)
MCHC RBC AUTO-ENTMCNC: 33.3 G/DL (ref 31–37)
MCV RBC AUTO: 83 FL (ref 78–102)
NEUTS SEG # BLD: 5.6 K/UL (ref 1.8–9.5)
NEUTS SEG NFR BLD: 65 % (ref 40–70)
PLATELET # BLD AUTO: 197 K/UL (ref 140–440)
RBC # BLD AUTO: 5.25 M/UL (ref 4.1–5.1)
WBC # BLD AUTO: 8.6 K/UL (ref 4.5–13)

## 2019-12-16 NOTE — PROGRESS NOTES
Hematology/Oncology  Progress Note    Name: Sera Mcfarlane  Date: 2019  : 1955    PCP: Marissa Chambers MD     Mr. Rona Chowdhury is a 59 y.o. man with a history of polycythemia/erythrocytosis. He received therapeutic phlebotomy whenever his hematocrit exceeds 45%. Current therapy: Therapeutic phlebotomy as needed when the hematocrit exceeds 45%. Subjective:     Mr. Rona Chowdhury is a 80-year-old man with erythrocytosis/polycythemia. He is doing well at this time. He has no physical complaints to report. He denies having any shortness of breath, headache, blurred vision, or abdominal pain. He reports that his appetite is good and his activity level is excellent. Past medical history, family history, and social history: these were reviewed and remains unchanged. 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 Dr. Pablo De La Cruz at [facility].   UROLOGICAL  10/28/2019    Cystoscopy and UroLift (transprostatic implants # 7).  Dr. Ca Luong at [facility] 45 FLX DX W/COLLJ [address] WHEN PFRMD       Social History     Socioeconomic History    Marital status:      Spouse name: Not on file    Number of children: Not on file    Years of education: Not on file    Highest education level: Not on file   Occupational History    Not on file   Social Needs    Financial resource strain: Not on file    Food insecurity:     Worry: Not on file     Inability: Not on file    Transportation needs:     Medical: Not on file     Non-medical: Not on file   Tobacco Use    Smoking status: Never Smoker    Smokeless tobacco: Never Used   Substance and Sexual Activity    Alcohol use: No     Comment: Occasionally    Drug use: No    Sexual activity: Yes   Lifestyle    Physical activity:     Days per week: Not on file     Minutes per session: Not on file    Stress: Not on file   Relationships    Social connections:     Talks on phone: Not on file     Gets together: Not on file     Attends [employer] service: Not on file     Active member of club or organization: Not on file     Attends meetings of clubs or organizations: Not on file     Relationship status: Not on file    Intimate partner violence:     Fear of current or ex partner: Not on file     Emotionally abused: Not on file     Physically abused: Not on file     Forced sexual activity: Not on file   Other Topics Concern    Not on file   Social History Narrative    Not on file     Family History   Problem Relation Age of Onset    Cancer Mother     Hypertension Mother     Heart Disease Father     Diabetes Brother      Current Outpatient Medications   Medication Sig Dispense Refill    candesartan (ATACAND) 32 mg tablet TAKE 1 TABLET BY MOUTH DAILY 90 Tab 2    hydroCHLOROthiazide (HYDRODIURIL) 25 mg tablet TAKE 1 TABLET BY MOUTH DAILY 90 Tab 3    acetaminophen (TYLENOL) 500 mg tablet Take  by mouth every six (6) hours as needed for Pain.  cholecalciferol (VITAMIN D3) 1,000 unit cap Take  by mouth daily.  cyanocobalamin (VITAMIN B-12) 1,000 mcg tablet Take 1,000 mcg by mouth daily.  multivitamin (ONE A DAY) tablet Take 1 Tab by mouth daily.          Review of Systems  Constitutional: The patient has no acute distress or discomfort. HEENT: The patient denies recent head trauma, eye pain, blurred vision,  hearing deficit, oropharyngeal mucosal pain or lesions, and the patient denies throat pain or discomfort. Lymphatics: The patient denies palpable peripheral lymphadenopathy. Hematologic: The patient denies having bruising, bleeding, or progressive fatigue. Respiratory: Patient denies having shortness of breath, cough, sputum production, fever, or dyspnea on exertion. Cardiovascular: The patient denies having leg pain, leg swelling, heart palpitations, chest permit, chest pain, or lightheadedness. The patient denies having dyspnea on exertion. Gastrointestinal: The patient denies having nausea, emesis, or diarrhea. The patient denies having any hematemesis or blood in the stool. Genitourinary: Patient denies having urinary urgency, frequency, or dysuria. The patient denies having blood in the urine. Psychological: The patient denies having symptoms of nervousness, anxiety, depression, or thoughts of harming self. Skin: Patient denies having skin rashes, skin, ulcerations, or unexplained itching or pruritus. Musculoskeletal: The patient denies having pain in the joints or bones. Objective:     Visit Vitals  /78   Pulse 76   Temp 98.2 °F (36.8 °C) (Oral)   Resp 18   Ht 6' 1\" (1.854 m)   Wt 107.5 kg (237 lb)   SpO2 96%   BMI 31.27 kg/m²     ECOG PS=0  Physical Exam:   Gen. Appearance: The patient is in no acute distress. Skin: There is no bruise or rash. HEENT: The exam is unremarkable. Neck: Supple without lymphadenopathy or thyromegaly. Lungs: Clear to auscultation and percussion; there are no wheezes or rhonchi. Heart: Regular rate and rhythm; there are no murmurs, gallops, or rubs. Abdomen: Bowel sounds are present and normal.  There is no guarding, tenderness, or hepatosplenomegaly. Extremities: There is no clubbing, cyanosis, or edema.   Neurologic: There are no focal neurologic deficits. Lymphatics: There is no palpable peripheral lymphadenopathy. Musculoskeletal: The patient has full range of motion at all joints. There is no evidence of joint deformity or effusions. There is no focal joint tenderness. Psychological/psychiatric: There is no clinical evidence of anxiety, depression, or melancholy. Lab data:      Results for orders placed or performed during the hospital encounter of 12/16/19   CBC WITH 3 PART DIFF     Status: Abnormal   Result Value Ref Range Status    WBC 8.6 4.5 - 13.0 K/uL Final    RBC 5.25 (H) 4.10 - 5.10 M/uL Final    HGB 14.5 12.0 - 16 g/dL Final    HCT 43.6 36 - 48 % Final    MCV 83.0 78 - 102 FL Final    MCH 27.6 25.0 - 35.0 PG Final    MCHC 33.3 31 - 37 g/dL Final    RDW 14.8 (H) 11.5 - 14.5 % Final    PLATELET 654 641 - 898 K/uL Final    NEUTROPHILS 65 40 - 70 % Final    MIXED CELLS 8 0.1 - 17 % Final    LYMPHOCYTES 27 14 - 44 % Final    ABS. NEUTROPHILS 5.6 1.8 - 9.5 K/UL Final    ABS. MIXED CELLS 0.7 0.0 - 2.3 K/uL Final    ABS. LYMPHOCYTES 2.3 1.1 - 5.9 K/UL Final     Comment: Test performed at [address] [place] [address] or Outpatient Infusion Center Location. Reviewed by Medical Director. DF AUTOMATED   Final           Assessment:     1. Polycythemia    2. Erythrocytosis        Plan:   Polycythemia/erythrocytosis: The CBC from today shows a WBC of 8.6, Hemoglobin of 43.6 g/dl and hematocrit of 43.6%. If his hematocrit is below 45% he will not need to receive phlebotomy this month. A comprehensive metabolic panel will be obtained. Phlebotomy will began when his HCT exceeds  45% and above and will continue weekly until his hematocrit is below 42%. His last H&H on 12/10 was 15.8 g/dl and 47.5%. he does have appointment with [facility] on 12/17/2019.      Follow-up in 3 months  Orders Placed This Encounter    COMPLETE CBC & AUTO DIFF WBC    InHouse CBC ([facility])     Standing Status:   Future     Number of Occurrences:   1     Standing Expiration Date:   28/10/9680    METABOLIC PANEL, COMPREHENSIVE     Standing Status:   Future     Number of Occurrences:   1     Standing Expiration Date:   12/16/2020    FERRITIN     Standing Status:   Future     Number of Occurrences:   1     Standing Expiration Date:   12/16/2020    IRON PROFILE     Standing Status:   Future     Number of Occurrences:   1     Standing Expiration Date:   12/16/2020       Krystal Zhou NP  12/16/2019     I have assessed the patient independently and  agree with the full assessment as outlined. Geeta Mathews MD, FACP      Please note: This document has been produced using voice recognition software. Unrecognized errors in transcription may be present.

## 2019-12-16 NOTE — PATIENT INSTRUCTIONS
Polycythemia: Care Instructions  Your Care Instructions    Polycythemia (say \"paw-leonor-sy-THEE-jane-uh) is an abnormal increase in red blood cells. It happens when the tissue inside your bones (bone marrow) makes too much blood. It also can occur if your blood does not have enough liquid, or plasma. This can make the number of red blood cells seem higher than normal. The extra red blood cells make your blood thicker than normal. This may raise your risk for blood clots that can cause heart attacks or strokes. Clots can form in the deep veins of the body, a condition called deep vein thrombosis. Or, a clot can travel through the blood to a lung (a pulmonary embolism). Your doctor may treat you by taking out some of your blood (phlebotomy). The process is like donating blood. Your doctor may even recommend that you donate blood. You may take pills to stop your body from making red blood cells. You also will get treatment for any other conditions that may cause your body to make too many red blood cells. Follow-up care is a key part of your treatment and safety. Be sure to make and go to all appointments, and call your doctor if you are having problems. It's also a good idea to know your test results and keep a list of the medicines you take. How can you care for yourself at home? · Be safe with medicines. Take your medicines exactly as prescribed. Call your doctor if you think you are having a problem with your medicine. · Drink plenty of fluids, enough so that your urine is light yellow or clear like water, before and after you have blood removed. If you have kidney, heart, or liver disease and have to limit fluids, talk with your doctor before you increase the amount of fluids you drink. · Take it easy after you have had blood removed. Do not do vigorous exercise. · If your doctor recommends aspirin, take it exactly as prescribed.  Call your doctor if you think you are having a problem with your medicine. · Do not smoke. Smoking increases the risk of blood clots and may reduce the amount of oxygen in your blood. If you need help quitting, talk to your doctor about stop-smoking programs and medicines. These can increase your chances of quitting for good. · Take an antihistamine, such as a nondrowsy one like loratadine (Claritin) or one that might make you sleepy like diphenhydramine (Benadryl), if your skin is itchy. Some people who have this condition have itching. · Wear medical alert jewelry that lists your clotting problem. You can buy this at most [employer]dakes. When should you call for help? Call 911 anytime you think you may need emergency care. For example, call if:    · You have sudden chest pain and shortness of breath, or you cough up blood.     · You have symptoms of a stroke. These may include:  ? Sudden numbness, tingling, weakness, or loss of movement in your face, arm, or leg, especially on only one side of your body. ? Sudden vision changes. ? Sudden trouble speaking. ? Sudden confusion or trouble understanding simple statements. ? Sudden problems with walking or balance. ? A sudden, severe headache that is different from past headaches.     · You have symptoms of a heart attack. These may include:  ? Chest pain or pressure, or a strange feeling in the chest.  ? Sweating. ? Shortness of breath. ? Nausea or vomiting. ? Pain, pressure, or a strange feeling in the back, neck, jaw, or upper belly or in one or both shoulders or arms. ? Lightheadedness or sudden weakness. ? A fast or irregular heartbeat. After you call 911, the  may tell you to chew 1 adult-strength or 2 to 4 low-dose aspirin. Wait for an ambulance. Do not try to drive yourself.    Call your doctor now or seek immediate medical care if:    · You have signs of a blood clot, such as:  ? Pain in your calf, back of knee, thigh, or groin. ?  Redness and swelling in your leg or groin.    Watch closely for changes in your health, and be sure to contact your doctor if you have any problems. Where can you learn more? Go to http://amaya-pepito.info/. Enter C878 in the search box to learn more about \"Polycythemia: Care Instructions. \"  Current as of: March 28, 2019  Content Version: 12.2  © 8266-6152 [employer]. Care instructions adapted under license by [employer] (which disclaims liability or warranty for this information). If you have questions about a medical condition or this instruction, always ask your healthcare professional. [address] any warranty or liability for your use of this information. Complete Blood Count (CBC): About This Test  What is it? A complete blood count (CBC) is a blood test that gives important information about your blood cells, especially red blood cells, white blood cells, and platelets. Why is this test done? A CBC may be done as part of a regular physical exam. There are many other reasons that a doctor may want this blood test, including to:  · Find the cause of symptoms such as fatigue, weakness, fever, bruising, or weight loss. · Find anemia or an infection. · See how much blood has been lost if there is bleeding. · Diagnose diseases of the blood, such as leukemia or polycythemia. How can you prepare for the test?  You do not need to do anything before having this test.  What happens during the test?  The health professional taking a sample of your blood will:  · Wrap an elastic band around your upper arm. This makes the veins below the band larger so it is easier to put a needle into the vein. · Clean the needle site with alcohol. · Put the needle into the vein. · Attach a tube to the needle to fill it with blood. · Remove the band from your arm when enough blood is collected. · Put a gauze pad or cotton ball over the needle site as the needle is removed.   · Put pressure on the site and then put on a bandage. If this blood test is done on a baby, a heel stick may be done instead of a blood draw from a vein. What happens after the test?  · You will probably be able to go home right away. · You can go back to your usual activities right away. Follow-up care is a key part of your treatment and safety. Be sure to make and go to all appointments, and call your doctor if you are having problems. It's also a good idea to keep a list of the medicines you take. Ask your doctor when you can expect to have your test results. Where can you learn more? Go to http://amaya-pepito.info/. Enter X675 in the search box to learn more about \"Complete Blood Count (CBC): About This Test.\"  Current as of: March 28, 2019  Content Version: 12.2  © 4212-3759 [employer]. Care instructions adapted under license by [employer] (which disclaims liability or warranty for this information). If you have questions about a medical condition or this instruction, always ask your healthcare professional. [address] any warranty or liability for your use of this information.

## 2019-12-17 LAB
ALBUMIN SERPL-MCNC: 4.2 G/DL (ref 3.6–4.8)
ALBUMIN/GLOB SERPL: 1.9 {RATIO} (ref 1.2–2.2)
ALP SERPL-CCNC: 56 IU/L (ref 39–117)
ALT SERPL-CCNC: 25 IU/L (ref 0–44)
AST SERPL-CCNC: 18 IU/L (ref 0–40)
BILIRUB SERPL-MCNC: 0.8 MG/DL (ref 0–1.2)
BUN SERPL-MCNC: 18 MG/DL (ref 8–27)
BUN/CREAT SERPL: 14 (ref 10–24)
CALCIUM SERPL-MCNC: 8.9 MG/DL (ref 8.6–10.2)
CHLORIDE SERPL-SCNC: 103 MMOL/L (ref 96–106)
CO2 SERPL-SCNC: 23 MMOL/L (ref 20–29)
CREAT SERPL-MCNC: 1.26 MG/DL (ref 0.76–1.27)
FERRITIN SERPL-MCNC: 25 NG/ML (ref 30–400)
GLOBULIN SER CALC-MCNC: 2.2 G/DL (ref 1.5–4.5)
GLUCOSE SERPL-MCNC: 99 MG/DL (ref 65–99)
IRON SATN MFR SERPL: 17 % (ref 15–55)
IRON SERPL-MCNC: 53 UG/DL (ref 38–169)
POTASSIUM SERPL-SCNC: 3.7 MMOL/L (ref 3.5–5.2)
PROT SERPL-MCNC: 6.4 G/DL (ref 6–8.5)
SODIUM SERPL-SCNC: 141 MMOL/L (ref 134–144)
TIBC SERPL-MCNC: 314 UG/DL (ref 250–450)
UIBC SERPL-MCNC: 261 UG/DL (ref 111–343)

## 2020-03-16 ENCOUNTER — OFFICE VISIT (OUTPATIENT)
Dept: ONCOLOGY | Age: 65
End: 2020-03-16

## 2020-03-16 ENCOUNTER — HOSPITAL ENCOUNTER (OUTPATIENT)
Dept: ONCOLOGY | Age: 65
Discharge: HOME | End: 2020-03-16

## 2020-03-16 VITALS
SYSTOLIC BLOOD PRESSURE: 146 MMHG | TEMPERATURE: 98.4 F | BODY MASS INDEX: 30.32 KG/M2 | HEART RATE: 77 BPM | DIASTOLIC BLOOD PRESSURE: 81 MMHG | RESPIRATION RATE: 18 BRPM | WEIGHT: 229.8 LBS

## 2020-03-16 DIAGNOSIS — D75.1 POLYCYTHEMIA: ICD-10-CM

## 2020-03-16 DIAGNOSIS — D75.1 ERYTHROCYTOSIS: ICD-10-CM

## 2020-03-16 DIAGNOSIS — D75.1 POLYCYTHEMIA: Primary | ICD-10-CM

## 2020-03-16 LAB
BASO+EOS+MONOS # BLD AUTO: 0.9 K/UL (ref 0–2.3)
BASO+EOS+MONOS NFR BLD AUTO: 12 % (ref 0.1–17)
DIFFERENTIAL METHOD BLD: ABNORMAL
ERYTHROCYTE [DISTWIDTH] IN BLOOD BY AUTOMATED COUNT: 14.4 % (ref 11.5–14.5)
HCT VFR BLD AUTO: 45.9 % (ref 36–48)
HGB BLD-MCNC: 14.7 G/DL (ref 12–16)
LYMPHOCYTES # BLD: 1.8 K/UL (ref 1.1–5.9)
LYMPHOCYTES NFR BLD: 23 % (ref 14–44)
MCH RBC QN AUTO: 26.3 PG (ref 25–35)
MCHC RBC AUTO-ENTMCNC: 32 G/DL (ref 31–37)
MCV RBC AUTO: 82.3 FL (ref 78–102)
NEUTS SEG # BLD: 5 K/UL (ref 1.8–9.5)
NEUTS SEG NFR BLD: 65 % (ref 40–70)
PLATELET # BLD AUTO: 207 K/UL (ref 140–440)
RBC # BLD AUTO: 5.58 M/UL (ref 4.1–5.1)
WBC # BLD AUTO: 7.7 K/UL (ref 4.5–13)

## 2020-03-16 RX ORDER — LORATADINE 10 MG/1
10 TABLET ORAL
COMMUNITY
End: 2021-05-17

## 2020-03-16 NOTE — PROGRESS NOTES
Hematology/Oncology  Progress Note    Name: Shirlene Rice  Date: 3/16/2020  : 1955    PCP: Olga Mtz MD     Mr. Mainor Mckenzie is a 59 y.o. man with a history of polycythemia/erythrocytosis. He received therapeutic phlebotomy whenever his hematocrit exceeds 45%. Current therapy: Therapeutic phlebotomy as needed when the hematocrit exceeds 45%. Subjective:     Mr. Mainor Mckenzie is a 79-year-old man with erythrocytosis/polycythemia. He is doing well at this time. He has no physical complaints to report. He denies having any shortness of breath, headache, blurred vision, or abdominal pain. He reports that his appetite is good and his activity level is excellent. Past medical history, family history, and social history: these were reviewed and remains unchanged. 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 Dr. Homero Gurrloa at [facility].  HX UROLOGICAL  10/28/2019    Cystoscopy and UroLift (transprostatic implants # 7).  Dr. Maria Fernanda Frye at [facility] 45 FLX DX W/COLLJ [address] PFRMD       Social History     Socioeconomic History    Marital status:      Spouse name: Not on file    Number of children: Not on file    Years of education: Not on file    Highest education level: Not on file   Occupational History    Not on file   Social Needs    Financial resource strain: Not on file    Food insecurity     Worry: Not on file     Inability: Not on file    Transportation needs     Medical: Not on file     Non-medical: Not on file   Tobacco Use    Smoking status: Never Smoker    Smokeless tobacco: Never Used   Substance and Sexual Activity    Alcohol use: No     Comment: Occasionally    Drug use: No    Sexual activity: Yes   Lifestyle    Physical activity     Days per week: Not on file     Minutes per session: Not on file    Stress: Not on file   Relationships    Social connections     Talks on phone: Not on file     Gets together: Not on file     Attends Scientologist service: Not on file     Active member of club or organization: Not on file     Attends meetings of clubs or organizations: Not on file     Relationship status: Not on file    Intimate partner violence     Fear of current or ex partner: Not on file     Emotionally abused: Not on file     Physically abused: Not on file     Forced sexual activity: Not on file   Other Topics Concern    Not on file   Social History Narrative    Not on file     Family History   Problem Relation Age of Onset    Cancer Mother     Hypertension Mother     Heart Disease Father     Diabetes Brother      Current Outpatient Medications   Medication Sig Dispense Refill    loratadine (CLARITIN) 10 mg tablet Take 10 mg by mouth.  candesartan (ATACAND) 32 mg tablet TAKE 1 TABLET BY MOUTH DAILY 90 Tab 2    hydroCHLOROthiazide (HYDRODIURIL) 25 mg tablet TAKE 1 TABLET BY MOUTH DAILY 90 Tab 3    acetaminophen (TYLENOL) 500 mg tablet Take  by mouth every six (6) hours as needed for Pain.  cholecalciferol (VITAMIN D3) 1,000 unit cap Take  by mouth daily.  cyanocobalamin (VITAMIN B-12) 1,000 mcg tablet Take 1,000 mcg by mouth daily.  multivitamin (ONE A DAY) tablet Take 1 Tab by mouth daily. Review of Systems  Constitutional: The patient has no acute distress or discomfort. HEENT: The patient denies recent head trauma, eye pain, blurred vision,  hearing deficit, oropharyngeal mucosal pain or lesions, and the patient denies throat pain or discomfort. Lymphatics: The patient denies palpable peripheral lymphadenopathy. Hematologic: The patient denies having bruising, bleeding, or progressive fatigue. Respiratory: Patient denies having shortness of breath, cough, sputum production, fever, or dyspnea on exertion. Cardiovascular: The patient denies having leg pain, leg swelling, heart palpitations, chest permit, chest pain, or lightheadedness. The patient denies having dyspnea on exertion. Gastrointestinal: The patient denies having nausea, emesis, or diarrhea. The patient denies having any hematemesis or blood in the stool. Genitourinary: Patient denies having urinary urgency, frequency, or dysuria. The patient denies having blood in the urine. Psychological: The patient denies having symptoms of nervousness, anxiety, depression, or thoughts of harming self. Skin: Patient denies having skin rashes, skin, ulcerations, or unexplained itching or pruritus. Musculoskeletal: The patient denies having pain in the joints or bones. Objective:     Visit Vitals  /81   Pulse 77   Temp 98.4 °F (36.9 °C)   Resp 18   Wt 104.2 kg (229 lb 12.8 oz)   BMI 30.32 kg/m²     ECOG PS=0  Physical Exam:   Gen. Appearance: The patient is in no acute distress. Skin: There is no bruise or rash. HEENT: The exam is unremarkable. Neck: Supple without lymphadenopathy or thyromegaly. Lungs: Clear to auscultation and percussion; there are no wheezes or rhonchi. Heart: Regular rate and rhythm; there are no murmurs, gallops, or rubs. Abdomen: Bowel sounds are present and normal.  There is no guarding, tenderness, or hepatosplenomegaly. Extremities: There is no clubbing, cyanosis, or edema.   Neurologic: There are no focal neurologic deficits. Lymphatics: There is no palpable peripheral lymphadenopathy. Musculoskeletal: The patient has full range of motion at all joints. There is no evidence of joint deformity or effusions. There is no focal joint tenderness. Psychological/psychiatric: There is no clinical evidence of anxiety, depression, or melancholy. Lab data:      Results for orders placed or performed during the hospital encounter of 03/16/20   CBC WITH 3 PART DIFF     Status: Abnormal   Result Value Ref Range Status    WBC 7.7 4.5 - 13.0 K/uL Final    RBC 5.58 (H) 4.10 - 5.10 M/uL Final    HGB 14.7 12.0 - 16 g/dL Final    HCT 45.9 36 - 48 % Final    MCV 82.3 78 - 102 FL Final    MCH 26.3 25.0 - 35.0 PG Final    MCHC 32.0 31 - 37 g/dL Final    RDW 14.4 11.5 - 14.5 % Final    PLATELET 031 942 - 847 K/uL Final    NEUTROPHILS 65 40 - 70 % Final    MIXED CELLS 12 0.1 - 17 % Final    LYMPHOCYTES 23 14 - 44 % Final    ABS. NEUTROPHILS 5.0 1.8 - 9.5 K/UL Final    ABS. MIXED CELLS 0.9 0.0 - 2.3 K/uL Final    ABS. LYMPHOCYTES 1.8 1.1 - 5.9 K/UL Final     Comment: Test performed at [address] [place] [address] or Outpatient Infusion Center Location. Reviewed by Medical Director. DF AUTOMATED   Final           Assessment:     1. Polycythemia        Plan:   Polycythemia/erythrocytosis: The CBC from today shows a WBC of 7.7, the hemoglobin is 14.7 g/dL, hematocrit is 45.9%, and the platelet count is 740,316. If his hematocrit is below 45% he will not need to receive phlebotomy this month. A comprehensive metabolic panel will be obtained. Phlebotomy will began when his HCT exceeds  45% and above and will continue weekly until his hematocrit is below 42%. [facility] on 3/17/2020.     Follow-up in 3 months  Orders Placed This Encounter    COMPLETE CBC & AUTO DIFF WBC    InHouse CBC ([employer])     Standing Status:   Future     Number of Occurrences:   1     Standing Expiration Date:   3/23/2020    loratadine (CLARITIN) 10 mg tablet     Sig: Take 10 mg by mouth. Regino Paredes MD  3/16/2020       Please note: This document has been produced using voice recognition software. Unrecognized errors in transcription may be present.

## 2020-03-16 NOTE — PATIENT INSTRUCTIONS
Polycythemia: Care Instructions  Your Care Instructions    Polycythemia (say \"paw-leonor-sy-THEE-jane-uh) is an abnormal increase in red blood cells. It happens when the tissue inside your bones (bone marrow) makes too much blood. It also can occur if your blood does not have enough liquid, or plasma. This can make the number of red blood cells seem higher than normal. The extra red blood cells make your blood thicker than normal. This may raise your risk for blood clots that can cause heart attacks or strokes. Clots can form in the deep veins of the body, a condition called deep vein thrombosis. Or, a clot can travel through the blood to a lung (a pulmonary embolism). Your doctor may treat you by taking out some of your blood (phlebotomy). The process is like donating blood. Your doctor may even recommend that you donate blood. You may take pills to stop your body from making red blood cells. You also will get treatment for any other conditions that may cause your body to make too many red blood cells. Follow-up care is a key part of your treatment and safety. Be sure to make and go to all appointments, and call your doctor if you are having problems. It's also a good idea to know your test results and keep a list of the medicines you take. How can you care for yourself at home? · Be safe with medicines. Take your medicines exactly as prescribed. Call your doctor if you think you are having a problem with your medicine. · Drink plenty of fluids, enough so that your urine is light yellow or clear like water, before and after you have blood removed. If you have kidney, heart, or liver disease and have to limit fluids, talk with your doctor before you increase the amount of fluids you drink. · Take it easy after you have had blood removed. Do not do vigorous exercise. · If your doctor recommends aspirin, take it exactly as prescribed.  Call your doctor if you think you are having a problem with your medicine. · Do not smoke. Smoking increases the risk of blood clots and may reduce the amount of oxygen in your blood. If you need help quitting, talk to your doctor about stop-smoking programs and medicines. These can increase your chances of quitting for good. · Take an antihistamine, such as a nondrowsy one like loratadine (Claritin) or one that might make you sleepy like diphenhydramine (Benadryl), if your skin is itchy. Some people who have this condition have itching. · Wear medical alert jewelry that lists your clotting problem. You can buy this at most drugsScannteches. When should you call for help? Call 911 anytime you think you may need emergency care. For example, call if:    · You have sudden chest pain and shortness of breath, or you cough up blood.     · You have symptoms of a stroke. These may include:  ? Sudden numbness, tingling, weakness, or loss of movement in your face, arm, or leg, especially on only one side of your body. ? Sudden vision changes. ? Sudden trouble speaking. ? Sudden confusion or trouble understanding simple statements. ? Sudden problems with walking or balance. ? A sudden, severe headache that is different from past headaches.     · You have symptoms of a heart attack. These may include:  ? Chest pain or pressure, or a strange feeling in the chest.  ? Sweating. ? Shortness of breath. ? Nausea or vomiting. ? Pain, pressure, or a strange feeling in the back, neck, jaw, or upper belly or in one or both shoulders or arms. ? Lightheadedness or sudden weakness. ? A fast or irregular heartbeat. After you call  911, the  may tell you to chew 1 adult-strength or 2 to 4 low-dose aspirin. Wait for an ambulance. Do not try to drive yourself.    Call your doctor now or seek immediate medical care if:    · You have signs of a blood clot, such as:  ? Pain in your calf, back of knee, thigh, or groin. ?  Redness and swelling in your leg or groin.    Watch closely for changes in your health, and be sure to contact your doctor if you have any problems. Where can you learn more? Go to http://amaya-pepito.info/  Enter [id number] in the search box to learn more about \"Polycythemia: Care Instructions. \"  Current as of: November 7, 2019Content Version: 12.4  © 5999-6870 [employer]. Care instructions adapted under license by [employer] (which disclaims liability or warranty for this information). If you have questions about a medical condition or this instruction, always ask your healthcare professional. [address] any warranty or liability for your use of this information.

## 2020-03-18 LAB
ALBUMIN SERPL-MCNC: 4.1 G/DL (ref 3.8–4.8)
ALBUMIN/GLOB SERPL: 1.7 {RATIO} (ref 1.2–2.2)
ALP SERPL-CCNC: 68 IU/L (ref 39–117)
ALT SERPL-CCNC: 21 IU/L (ref 0–44)
AST SERPL-CCNC: 15 IU/L (ref 0–40)
BILIRUB SERPL-MCNC: 0.6 MG/DL (ref 0–1.2)
BUN SERPL-MCNC: 20 MG/DL (ref 8–27)
BUN/CREAT SERPL: 18 (ref 10–24)
CALCIUM SERPL-MCNC: 9.3 MG/DL (ref 8.6–10.2)
CHLORIDE SERPL-SCNC: 97 MMOL/L (ref 96–106)
CO2 SERPL-SCNC: 22 MMOL/L (ref 20–29)
CREAT SERPL-MCNC: 1.12 MG/DL (ref 0.76–1.27)
FERRITIN SERPL-MCNC: 17 NG/ML (ref 30–400)
GLOBULIN SER CALC-MCNC: 2.4 G/DL (ref 1.5–4.5)
GLUCOSE SERPL-MCNC: 143 MG/DL (ref 65–99)
IRON SATN MFR SERPL: 21 % (ref 15–55)
IRON SERPL-MCNC: 63 UG/DL (ref 38–169)
POTASSIUM SERPL-SCNC: 3.7 MMOL/L (ref 3.5–5.2)
PROT SERPL-MCNC: 6.5 G/DL (ref 6–8.5)
SODIUM SERPL-SCNC: 137 MMOL/L (ref 134–144)
TIBC SERPL-MCNC: 307 UG/DL (ref 250–450)
UIBC SERPL-MCNC: 244 UG/DL (ref 111–343)

## 2020-04-05 RX ORDER — HYDROCHLOROTHIAZIDE 25 MG/1
TABLET ORAL
Qty: 90 TAB | Refills: 0 | Status: SHIPPED | OUTPATIENT
Start: 2020-04-05 | End: 2020-07-06

## 2020-06-12 ENCOUNTER — TELEPHONE (OUTPATIENT)
Dept: FAMILY MEDICINE CLINIC | Age: 65
End: 2020-06-12

## 2020-06-12 NOTE — TELEPHONE ENCOUNTER
Patient called stating Dr. Milind Laguna is retiring. He is requesting to be referred to [facility].

## 2020-06-15 DIAGNOSIS — D75.1 POLYCYTHEMIA: Primary | ICD-10-CM

## 2020-07-03 DIAGNOSIS — I10 ESSENTIAL HYPERTENSION: ICD-10-CM

## 2020-07-06 RX ORDER — HYDROCHLOROTHIAZIDE 25 MG/1
TABLET ORAL
Qty: 90 TAB | Refills: 0 | Status: SHIPPED | OUTPATIENT
Start: 2020-07-06 | End: 2020-10-01 | Stop reason: SDUPTHER

## 2020-07-06 RX ORDER — CANDESARTAN 32 MG/1
TABLET ORAL
Qty: 90 TAB | Refills: 0 | Status: SHIPPED | OUTPATIENT
Start: 2020-07-06 | End: 2020-10-01 | Stop reason: SDUPTHER

## 2020-07-06 NOTE — TELEPHONE ENCOUNTER
Patient needs to have an appointment and labs checked prior to any further refills. No further refills without appointment.   Cece Hardin MD

## 2020-10-01 ENCOUNTER — TELEPHONE (OUTPATIENT)
Dept: FAMILY MEDICINE CLINIC | Age: 65
End: 2020-10-01

## 2020-10-01 DIAGNOSIS — I10 ESSENTIAL HYPERTENSION: ICD-10-CM

## 2020-10-01 RX ORDER — CANDESARTAN 32 MG/1
TABLET ORAL
Qty: 90 TAB | Refills: 0 | Status: SHIPPED | OUTPATIENT
Start: 2020-10-01 | End: 2020-12-30 | Stop reason: SDUPTHER

## 2020-10-01 RX ORDER — HYDROCHLOROTHIAZIDE 25 MG/1
TABLET ORAL
Qty: 90 TAB | Refills: 0 | Status: SHIPPED | OUTPATIENT
Start: 2020-10-01 | End: 2020-12-30 | Stop reason: SDUPTHER

## 2020-10-01 NOTE — TELEPHONE ENCOUNTER
[facility] FAX    PRESCRIPTION REFILL REQUES    CANDESARTAN 32MG TABLETS  QTY: 90  TAKE 1 TABLET BY MOUTH EVERY DAY    HYDROCHLOROTHIAZIDE 25MG TABLET  QTY: 90  TAKE 1 TABLET BY MOUTH EVERY DAY

## 2020-10-01 NOTE — TELEPHONE ENCOUNTER
Please let patient know he needs to set up an appointment to come in for follow-up care and check of his renal functions. He should do this within the next 2 months as no further refills without having an appointment.   He has not been seen for more than 20 months  Nat Malhotra MD

## 2020-11-17 ENCOUNTER — OFFICE VISIT (OUTPATIENT)
Dept: FAMILY MEDICINE CLINIC | Age: 65
End: 2020-11-17
Payer: MEDICARE

## 2020-11-17 ENCOUNTER — HOSPITAL ENCOUNTER (OUTPATIENT)
Dept: LAB | Age: 65
Discharge: HOME | End: 2020-11-17
Payer: MEDICARE

## 2020-11-17 VITALS
RESPIRATION RATE: 16 BRPM | WEIGHT: 234 LBS | HEART RATE: 77 BPM | DIASTOLIC BLOOD PRESSURE: 85 MMHG | TEMPERATURE: 98.1 F | OXYGEN SATURATION: 97 % | BODY MASS INDEX: 31.01 KG/M2 | SYSTOLIC BLOOD PRESSURE: 136 MMHG | HEIGHT: 73 IN

## 2020-11-17 DIAGNOSIS — Z71.89 ADVANCED DIRECTIVES, COUNSELING/DISCUSSION: ICD-10-CM

## 2020-11-17 DIAGNOSIS — I10 ESSENTIAL HYPERTENSION: ICD-10-CM

## 2020-11-17 DIAGNOSIS — Z00.00 WELCOME TO MEDICARE PREVENTIVE VISIT: ICD-10-CM

## 2020-11-17 DIAGNOSIS — E78.5 DYSLIPIDEMIA: ICD-10-CM

## 2020-11-17 DIAGNOSIS — N40.1 BENIGN LOCALIZED PROSTATIC HYPERPLASIA WITH LOWER URINARY TRACT SYMPTOMS (LUTS): ICD-10-CM

## 2020-11-17 DIAGNOSIS — Z12.5 SCREENING FOR MALIGNANT NEOPLASM OF PROSTATE: ICD-10-CM

## 2020-11-17 DIAGNOSIS — R10.13 DYSPEPSIA: ICD-10-CM

## 2020-11-17 DIAGNOSIS — D75.1 POLYCYTHEMIA: ICD-10-CM

## 2020-11-17 DIAGNOSIS — L98.9 SKIN LESION: Primary | ICD-10-CM

## 2020-11-17 DIAGNOSIS — Z71.89 ACP (ADVANCE CARE PLANNING): ICD-10-CM

## 2020-11-17 DIAGNOSIS — G47.30 SLEEP APNEA, UNSPECIFIED TYPE: ICD-10-CM

## 2020-11-17 DIAGNOSIS — Z13.39 SCREENING FOR ALCOHOLISM: ICD-10-CM

## 2020-11-17 LAB
CHOLEST SERPL-MCNC: 206 MG/DL
HDLC SERPL-MCNC: 34 MG/DL (ref 40–60)
HDLC SERPL: 6.1 {RATIO} (ref 0–5)
LDLC SERPL CALC-MCNC: 145.2 MG/DL (ref 0–100)
LIPID PROFILE,FLP: ABNORMAL
PSA SERPL-MCNC: 5.8 NG/ML (ref 0–4)
TRIGL SERPL-MCNC: 134 MG/DL (ref ?–150)
VLDLC SERPL CALC-MCNC: 26.8 MG/DL

## 2020-11-17 PROCEDURE — G8510 SCR DEP NEG, NO PLAN REQD: HCPCS | Performed by: FAMILY MEDICINE

## 2020-11-17 PROCEDURE — 99497 ADVNCD CARE PLAN 30 MIN: CPT | Performed by: FAMILY MEDICINE

## 2020-11-17 PROCEDURE — G8417 CALC BMI ABV UP PARAM F/U: HCPCS | Performed by: FAMILY MEDICINE

## 2020-11-17 PROCEDURE — 1101F PT FALLS ASSESS-DOCD LE1/YR: CPT | Performed by: FAMILY MEDICINE

## 2020-11-17 PROCEDURE — G8427 DOCREV CUR MEDS BY ELIG CLIN: HCPCS | Performed by: FAMILY MEDICINE

## 2020-11-17 PROCEDURE — G8752 SYS BP LESS 140: HCPCS | Performed by: FAMILY MEDICINE

## 2020-11-17 PROCEDURE — 99214 OFFICE O/P EST MOD 30 MIN: CPT | Performed by: FAMILY MEDICINE

## 2020-11-17 PROCEDURE — G8536 NO DOC ELDER MAL SCRN: HCPCS | Performed by: FAMILY MEDICINE

## 2020-11-17 PROCEDURE — G8754 DIAS BP LESS 90: HCPCS | Performed by: FAMILY MEDICINE

## 2020-11-17 PROCEDURE — 84153 ASSAY OF PSA TOTAL: CPT

## 2020-11-17 PROCEDURE — 36415 COLL VENOUS BLD VENIPUNCTURE: CPT

## 2020-11-17 PROCEDURE — G0402 INITIAL PREVENTIVE EXAM: HCPCS | Performed by: FAMILY MEDICINE

## 2020-11-17 PROCEDURE — 3017F COLORECTAL CA SCREEN DOC REV: CPT | Performed by: FAMILY MEDICINE

## 2020-11-17 PROCEDURE — 80061 LIPID PANEL: CPT

## 2020-11-17 RX ORDER — ASCORBIC ACID 500 MG
TABLET ORAL
COMMUNITY
End: 2021-05-17

## 2020-11-17 NOTE — ACP (ADVANCE CARE PLANNING)
Advance Care Planning     Advance Care Planning (ACP) Physician/NP/PA Conversation      Date of Conversation: 11/17/2020  Conducted with: Patient with Decision Making Capacity    Healthcare Decision Maker:     Click here to complete [address] including selection of the Healthcare Decision Maker Relationship (ie \"Primary\")  Today we documented Decision Maker(s). The patient will provide ACP documents. Clarice Ganser [phone]      Care Preferences:    Hospitalization: \"If your health worsens and it becomes clear that your chance of recovery is unlikely, what would be your preference regarding hospitalization? \"  The patient would prefer hospitalization. Ventilation: \"If you were unable to breathe on your own and your chance of recovery was unlikely, what would be your preference about the use of a ventilator (breathing machine) if it was available to you? \"   The patient would desire the use of a ventilator. Resuscitation: \"In the event your heart stopped as a result of an underlying serious health condition, would you want attempts to be made to restart your heart, or would you prefer a natural death? \"   Yes, attempt to resuscitate.     Additional topics discussed: treatment goals, ventilation preferences, hospitalization preferences and resuscitation preferences    Conversation Outcomes / Follow-Up Plan:   ACP in process - completing/providing documents   Reviewed DNR/DNI and patient elects Full Code (Attempt Resuscitation)     Length of Voluntary ACP Conversation in minutes:  16 minutes    Nat Pete MD

## 2020-11-17 NOTE — PROGRESS NOTES
This is a \"Welcome to [employer]"  Initial Preventive Physical Examination (IPPE) providing Personalized Prevention Plan Services (Performed in the first 12 months of enrollment)    I have reviewed the patient's medical history in detail and updated the computerized patient record. Depression Risk Screen     3 most recent PHQ Screens 11/17/2020   Little interest or pleasure in doing things Not at all   Feeling down, depressed, irritable, or hopeless Not at all   Total Score PHQ 2 0       Alcohol Risk Screen   Do you average more than 1 drink per night or more than 7 drinks a week: No    In the past three months have you have had more than 4 drinks containing alcohol on one occasion: No          Functional Ability and Level of Safety   1. Was the patient's timed Up and GO test unsteady or longer than 30 seconds? Yes  2. Does the patient need help with the phone, transportation, shopping, preparing meals, housework, laundry, medications or managing money? No  3. Does the patients' home have rugs in the hallway, lack grab bars in the bathroom, lack handrails on the stairs or have poor lighting? No  4. Have you noticed any hearing difficulties? No  Hearing Evaluation:    Diet: No special diet     Hearing: Hearing is good. Vision Screening:  Vision is good. No exam data present     Activities of Daily Living: The home contains: no safety equipment. Patient does total self care     Ambulation: with no difficulty     Exercise level: moderately active     Fall Risk Screen:  Fall Risk Assessment, last 12 mths 11/17/2020   Able to walk? Yes   Fall in past 12 months? No     Abuse Screen:  Patient is not abused       Screening EKG   EKG order placed: No    End of Life Planning   Advanced care planning directives were discussed with the patient and /or family/caregiver. Assessment/Plan   Education and counseling provided:  Are appropriate based on today's review and evaluation    1.  Welcome to Medicare preventive visit    2. ACP (advance care planning)  - FULL CODE    3. Advanced directives, counseling/discussion  - FULL CODE    4. Screening for alcoholism    Health Maintenance Due     Health Maintenance Due   Topic Date Due    Medicare Yearly Exam  11/17/2020       Patient Care Team   Patient Care Team:  Maria Eugenia Harkins MD as PCP - General (Family Medicine)  Maria Eugenia Harkins MD as PCP - [facility] Empaneled Provider  Tre Parker MD (Urology)    History   Paul Vallejo comes in for welcome to Medicare exam.    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 Dr. Britany Pinto at [facility].   UROLOGICAL  10/28/2019    Cystoscopy and UroLift (transprostatic implants # 7). Dr. Dayne Snellen at [facility] 45 FLX DX W/COLLJ SPEC WHEN PFRMD       Current Outpatient Medications   Medication Sig Dispense Refill    ascorbic acid, vitamin C, (Vitamin C) 500 mg tablet Take  by mouth.       hydroCHLOROthiazide (HYDRODIURIL) 25 mg tablet TAKE 1 TABLET BY MOUTH EVERY DAY no further refills without appointment 90 Tab 0    candesartan (ATACAND) 32 mg tablet TAKE 1 TABLET BY MOUTH EVERY DAY no further refills without appointment 90 Tab 0    loratadine (CLARITIN) 10 mg tablet Take 10 mg by mouth.  acetaminophen (TYLENOL) 500 mg tablet Take  by mouth every six (6) hours as needed for Pain.  cholecalciferol (VITAMIN D3) 1,000 unit cap Take  by mouth daily.  cyanocobalamin (VITAMIN B-12) 1,000 mcg tablet Take 1,000 mcg by mouth daily.  multivitamin (ONE A DAY) tablet Take 1 Tab by mouth daily. Allergies   Allergen Reactions    Penicillins Other (comments)     Pt says he isn't allergic to pcn    Scallops Nausea and Vomiting       Family History   Problem Relation Age of Onset   24 [facility] Cancer Mother     Hypertension Mother     Heart Disease Father     Diabetes Brother      Social History     Tobacco Use    Smoking status: Never Smoker    Smokeless tobacco: Never Used   Substance Use Topics    Alcohol use: No     Comment: Occasionally     I have discussed the diagnosis with the patient and the intended plan of care as seen in the above orders. The patient has received an after-visit summary and questions were answered concerning future plans. I have discussed medication, side effects, and warnings with the patient in detail. The patient verbalized understanding and is in agreement with the plan of care. The patient will contact the office with any additional concerns. Personalized preventative plan of care was discussed, printed and given to patient.     Bailee Mathew MD

## 2020-11-17 NOTE — PROGRESS NOTES
Chief Complaint   Patient presents with    Follow Up Chronic Condition     1. Have you been to the ER, urgent care clinic since your last visit? Hospitalized since your last visit? No    2. Have you seen or consulted any other health care providers outside of the [address] [place] [address] since your last visit? Include any pap smears or colon screening. No     HPI  Veloren Meth comes in for f/u care. Polycythemia: Patient has a history of polycythemia. He has been followed by the hematologist.  Previously he was seen by Dr. Marissa Hazel. Currently he is being seen by Dr Daniel Desai. He had phlebotomy done but this has been suspended for now. States that he was told he is stable. I will request his records. BPH with LUTS: Patient has a history of BPH and LUTS. He has been followed up by the urologist.  He has had urolift procedure done. Symptoms have stabilized. He does get occasional urinary frequency but denies dysuria or hematuria. Skin lesion: Patient has a skin lesion right leg. This is a raised lesion that has darkened recently. Occasionally does have some bleeding especially when he bumps it or scratches it. Given the bleeding and the darkening I will refer him to a dermatologist to get this taken out. Patient requests referral.  Dyslipidemia: We will check his lipid panel today. He should exercise and take a diet low in polysaturated fats. Sleep apnea: seen by the sleep specialist.  He is on CPAP. He has had the machine and tubing changed several times. Before he was on nasal cannula but this did not help much. He was put on a mask. Unfortunately with the mask he does get the sensation of chest congestion and abdominal fullness and bloating. He will discuss with his sleep specialist for changes in the pressure or delivery method. HTN: Patient has hypertension. Blood pressure is stable. He is on Atacand and HCTZ. Denies headache, changes in vision or focal weakness.   He has come in with a blood pressure log and his numbers are stable. Abdominal discomfort: he has abdominal discomfort with gaseous dyspepsia, not related to any food intake. States that this is also different from what he feels when he has used his CPAP. He has been seen by the gastroenterologist in the past and wonders about irritable bowel syndrome. Would like to see the gastroenterologist. Denies diarrhea, nausea or vomiting. Denies fever or chills or blood in stool. 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 Dr. Brayden Kulkarn[facility] at [facility].  HX UROLOGICAL  10/28/2019    Cystoscopy and UroLift (transprostatic implants # 7). Dr. Martinez  at [facility] 45 FLX DX W/COLLJ SPEC WHEN PFRMD          Medications  Current Outpatient Medications   Medication Sig Dispense Refill    ascorbic acid, vitamin C, (Vitamin C) 500 mg tablet Take  by mouth.       hydroCHLOROthiazide (HYDRODIURIL) 25 mg tablet TAKE 1 TABLET BY MOUTH EVERY DAY no further refills without appointment 90 Tab 0    candesartan (ATACAND) 32 mg tablet TAKE 1 TABLET BY MOUTH EVERY DAY no further refills without appointment 90 Tab 0    loratadine (CLARITIN) 10 mg tablet Take 10 mg by mouth.  acetaminophen (TYLENOL) 500 mg tablet Take  by mouth every six (6) hours as needed for Pain.  cholecalciferol (VITAMIN D3) 1,000 unit cap Take  by mouth daily.  cyanocobalamin (VITAMIN B-12) 1,000 mcg tablet Take 1,000 mcg by mouth daily.  multivitamin (ONE A DAY) tablet Take 1 Tab by mouth daily.          Allergies  Allergies   Allergen Reactions    Penicillins Other (comments)     Pt says he isn't allergic to pcn    Scallops Nausea and Vomiting       Family History  Family History   Problem Relation Age of Onset    Cancer Mother     Hypertension Mother     Heart Disease Father     Diabetes Brother        Social History  Social History     Socioeconomic History    Marital status:      Spouse name: Not on file    Number of children: Not on file    Years of education: Not on file    Highest education level: Not on file   Occupational History    Not on file   Social Needs    Financial resource strain: Not on file    Food insecurity     Worry: Not on file     Inability: Not on file    Transportation needs     Medical: Not on file     Non-medical: Not on file   Tobacco Use    Smoking status: Never Smoker    Smokeless tobacco: Never Used   Substance and Sexual Activity    Alcohol use: No     Comment: Occasionally    Drug use: No    Sexual activity: Yes   Lifestyle    Physical activity     Days per week: Not on file     Minutes per session: Not on file    Stress: Not on file   Relationships    Social connections     Talks on phone: Not on file     Gets together: Not on file     Attends Zoroastrian service: Not on file     Active member of club or organization: Not on file     Attends meetings of clubs or organizations: Not on file     Relationship status: Not on file    Intimate partner violence     Fear of current or ex partner: Not on file     Emotionally abused: Not on file     Physically abused: Not on file     Forced sexual activity: Not on file   Other Topics Concern    Not on file   Social History Narrative    Not on file       Review of Systems  Review of Systems - Review of all systems is negative except as noted above in the HPI. Vital Signs  Visit Vitals  /85 (BP 1 Location: Left arm, BP Patient Position: Sitting)   Pulse 77   Temp 98.1 °F (36.7 °C) (Oral)   Resp 16   Ht 6' 1\" (1.854 m)   Wt 234 lb (106.1 kg)   SpO2 97%   BMI 30.87 kg/m²         Physical Exam  Physical Examination: General appearance - alert, well appearing, and in no distress, oriented to person, place, and time, acyanotic, in no respiratory distress and well hydrated  Mental status - alert, oriented to person, place, and time, affect appropriate to mood  Mouth - mucous membranes moist, pharynx normal without lesions  Neck - supple, no significant adenopathy  Lymphatics - no palpable lymphadenopathy  Chest - no tachypnea, retractions or cyanosis  Heart - S1 and S2 normal  Abdomen - no rebound tenderness noted  bowel sounds normal  Back exam - limited range of motion  Neurological - motor and sensory grossly normal bilaterally  Musculoskeletal - no muscular tenderness noted  Extremities - no pedal edema noted  Skin -raised dark lesion measuring 2 x 2 cm that is on the right lower leg medial aspect, no bleeding or drainage noted. Results  Results for orders placed or performed during the hospital encounter of 03/16/20   CBC WITH 3 PART DIFF   Result Value Ref Range    WBC 7.7 4.5 - 13.0 K/uL    RBC 5.58 (H) 4.10 - 5.10 M/uL    HGB 14.7 12.0 - 16 g/dL    HCT 45.9 36 - 48 %    MCV 82.3 78 - 102 FL    MCH 26.3 25.0 - 35.0 PG    MCHC 32.0 31 - 37 g/dL    RDW 14.4 11.5 - 14.5 %    PLATELET 546 643 - 337 K/uL    NEUTROPHILS 65 40 - 70 %    MIXED CELLS 12 0.1 - 17 %    LYMPHOCYTES 23 14 - 44 %    ABS. NEUTROPHILS 5.0 1.8 - 9.5 K/UL    ABS. MIXED CELLS 0.9 0.0 - 2.3 K/uL    ABS.  LYMPHOCYTES 1.8 1.1 - 5.9 K/UL    DF AUTOMATED         ASSESSMENT and PLAN    ICD-10-CM ICD-9-CM    1. Skin lesion  L98.9 709.9 REFERRAL TO DERMATOLOGY   2. Essential hypertension  I10 401.9 LIPID PANEL   3. Dyslipidemia  E78.5 272.4    4. Screening for malignant neoplasm of prostate  Z12.5 V76.44 PSA SCREENING (SCREENING)   5. Dyspepsia  R10.13 536.8 REFERRAL TO GASTROENTEROLOGY   6. Polycythemia  D75.1 238.4    7. Benign localized prostatic hyperplasia with lower urinary tract symptoms (LUTS)  N40.1 600.21      599.69    8. Sleep apnea, unspecified type  G47.30 780.57      lab results and schedule of future lab studies reviewed with patient  reviewed diet, exercise and weight control  cardiovascular risk and specific lipid/LDL goals reviewed  reviewed medications and side effects in detail    I have discussed the diagnosis with the patient and the intended plan of care as seen in the above orders. The patient has received an after-visit summary and questions were answered concerning future plans. I have discussed medication, side effects, and warnings with the patient in detail. The patient verbalized understanding and is in agreement with the plan of care. The patient will contact the office with any additional concerns. Brandee Giraldo MD    PLEASE NOTE:   This document has been produced using voice recognition software.  Unrecognized errors in transcription may be present

## 2020-11-17 NOTE — PATIENT INSTRUCTIONS
Medicare Wellness Visit, Male    The best way to live healthy is to have a lifestyle where you eat a well-balanced diet, exercise regularly, limit alcohol use, and quit all forms of tobacco/nicotine, if applicable. Regular preventive services are another way to keep healthy. Preventive services (vaccines, screening tests, monitoring & exams) can help personalize your care plan, which helps you manage your own care. Screening tests can find health problems at the earliest stages, when they are easiest to treat. [facility] follows the current, evidence-based guidelines published by the [facility] E[facility] Ladarius ([facility]) when recommending preventive services for our patients. Because we follow these guidelines, sometimes recommendations change over time as research supports it. (For example, a prostate screening blood test is no longer routinely recommended for men with no symptoms). Of course, you and your doctor may decide to screen more often for some diseases, based on your risk and co-morbidities (chronic disease you are already diagnosed with). Preventive services for you include:  - Medicare offers their members a free annual wellness visit, which is time for you and your primary care provider to discuss and plan for your preventive service needs. Take advantage of this benefit every year!  -All adults over age 72 should receive the recommended pneumonia vaccines. Current USPSTF guidelines recommend a series of two vaccines for the best pneumonia protection.   -All adults should have a flu vaccine yearly and tetanus vaccine every 10 years.  -All adults age 48 and older should receive the shingles vaccines (series of two vaccines).        -All adults age 38-68 who are overweight should have a diabetes screening test once every three years.   -Other screening tests & preventive services for persons with diabetes include: an eye exam to screen for diabetic retinopathy, a kidney function test, a foot exam, and stricter control over your cholesterol.   -Cardiovascular screening for adults with routine risk involves an electrocardiogram (ECG) at intervals determined by the provider.   -Colorectal cancer screening should be done for adults age 54-65 with no increased risk factors for colorectal cancer. There are a number of acceptable methods of screening for this type of cancer. Each test has its own benefits and drawbacks. Discuss with your provider what is most appropriate for you during your annual wellness visit. The different tests include: colonoscopy (considered the best screening method), a fecal occult blood test, a fecal DNA test, and sigmoidoscopy.  -All adults born between [facility] should be screened once for Hepatitis C.  -An Abdominal Aortic Aneurysm (AAA) Screening is recommended for men age 73-68 who has ever smoked in their lifetime. Here is a list of your current Health Maintenance items (your personalized list of preventive services) with a due date:  Health Maintenance Due   Topic Date Due    Annual Well Visit  11/17/2020     Medicare Wellness Visit, Male    The best way to live healthy is to have a lifestyle where you eat a well-balanced diet, exercise regularly, limit alcohol use, and quit all forms of tobacco/nicotine, if applicable. Regular preventive services are another way to keep healthy. Preventive services (vaccines, screening tests, monitoring & exams) can help personalize your care plan, which helps you manage your own care. Screening tests can find health problems at the earliest stages, when they are easiest to treat. [facility] follows the current, evidence-based guidelines published by the [facility] Lad[facility] ([facility]) when recommending preventive services for our patients.  Because we follow these guidelines, sometimes recommendations change over time as research supports it. (For example, a prostate screening blood test is no longer routinely recommended for men with no symptoms). Of course, you and your doctor may decide to screen more often for some diseases, based on your risk and co-morbidities (chronic disease you are already diagnosed with). Preventive services for you include:  - Medicare offers their members a free annual wellness visit, which is time for you and your primary care provider to discuss and plan for your preventive service needs. Take advantage of this benefit every year!  -All adults over age 72 should receive the recommended pneumonia vaccines. Current USPSTF guidelines recommend a series of two vaccines for the best pneumonia protection.   -All adults should have a flu vaccine yearly and tetanus vaccine every 10 years.  -All adults age 48 and older should receive the shingles vaccines (series of two vaccines). -All adults age 38-68 who are overweight should have a diabetes screening test once every three years.   -Other screening tests & preventive services for persons with diabetes include: an eye exam to screen for diabetic retinopathy, a kidney function test, a foot exam, and stricter control over your cholesterol.   -Cardiovascular screening for adults with routine risk involves an electrocardiogram (ECG) at intervals determined by the provider.   -Colorectal cancer screening should be done for adults age 54-65 with no increased risk factors for colorectal cancer. There are a number of acceptable methods of screening for this type of cancer. Each test has its own benefits and drawbacks. Discuss with your provider what is most appropriate for you during your annual wellness visit.  The different tests include: colonoscopy (considered the best screening method), a fecal occult blood test, a fecal DNA test, and sigmoidoscopy.  -All adults born between [facility] should be screened once for Hepatitis C.  -An Abdominal Aortic Aneurysm (AAA) Screening is recommended for men age 73-68 who has ever smoked in their lifetime.      Here is a list of your current Health Maintenance items (your personalized list of preventive services) with a due date:  Health Maintenance Due   Topic Date Due    Annual Well Visit  11/17/2020

## 2020-11-19 NOTE — PROGRESS NOTES
Please let patient know his LDL cholesterol is elevated. I would recommend he take a cholesterol-lowering medication. If he is agreeable he should let me know and I will send in Crestor to take 20 mg daily. He should also exercise and take a diet low in polysaturated fats. His PSA is elevated at 5.8. He should follow-up with the urologist on this.   Luiza Mayorga MD

## 2020-11-23 NOTE — PROGRESS NOTES
Spoke with patient at at this time. Patient has agreed to take medication. Informed patient Rx will be sent in next week due to provider out of office this week. Patient verbalized understanding. Patient also states he will see his urologist on 12/01/20

## 2020-12-16 DIAGNOSIS — E78.5 DYSLIPIDEMIA: Primary | ICD-10-CM

## 2020-12-16 RX ORDER — ROSUVASTATIN CALCIUM 20 MG/1
20 TABLET, COATED ORAL
Qty: 30 TAB | Refills: 2 | Status: SHIPPED | OUTPATIENT
Start: 2020-12-16 | End: 2021-03-15 | Stop reason: SDUPTHER

## 2020-12-30 DIAGNOSIS — I10 ESSENTIAL HYPERTENSION: ICD-10-CM

## 2020-12-30 NOTE — TELEPHONE ENCOUNTER
Please see refill request    Patient was last seen on 11-    Last filled  10-1-2020   #90 X 0 on both medications    Call and advised patient that it would be next week before the request could be addressed, due to provider being out of the office  Patient advised that would be fine he has enough.   Patient verbalized understanding

## 2020-12-30 NOTE — TELEPHONE ENCOUNTER
[address] Refill Request    Requested Prescriptions     Pending Prescriptions Disp Refills    hydroCHLOROthiazide (HYDRODIURIL) 25 mg tablet 90 Tab 0     Sig: TAKE 1 TABLET BY MOUTH EVERY DAY no further refills without appointment    candesartan (ATACAND) 32 mg tablet 90 Tab 0     Sig: TAKE 1 TABLET BY MOUTH EVERY DAY no further refills without appointment

## 2021-01-04 RX ORDER — CANDESARTAN 32 MG/1
TABLET ORAL
Qty: 90 TAB | Refills: 0 | Status: SHIPPED | OUTPATIENT
Start: 2021-01-04 | End: 2021-03-30 | Stop reason: SDUPTHER

## 2021-01-04 RX ORDER — HYDROCHLOROTHIAZIDE 25 MG/1
TABLET ORAL
Qty: 90 TAB | Refills: 0 | Status: SHIPPED | OUTPATIENT
Start: 2021-01-04 | End: 2021-03-30 | Stop reason: SDUPTHER

## 2021-01-18 ENCOUNTER — TELEPHONE (OUTPATIENT)
Dept: FAMILY MEDICINE CLINIC | Age: 66
End: 2021-01-18

## 2021-01-18 DIAGNOSIS — I10 ESSENTIAL HYPERTENSION: Primary | ICD-10-CM

## 2021-01-18 NOTE — TELEPHONE ENCOUNTER
Please let patient know it is not possible to give him a note as we have not seen him here in the clinic. There is no record that he was seen here in this clinic last such a note that he was undermedicated care cannot be generated. Patient has been seen by the urologist and the he should call to set up an appointment for follow-up of kidney stone. The previous time we checked his renal function it was stable. He can come in and gets labs done so that we can see what his current renal function is.   Augusto Whitney MD

## 2021-01-18 NOTE — TELEPHONE ENCOUNTER
Patient called to speak with nurse. He wants to know if there is another virus going around. He's been sick for about a week. He also wants referral to Kidney specialist in [facility]. He is also asking about a quarantine note.

## 2021-01-18 NOTE — TELEPHONE ENCOUNTER
Spoke with patient at this time. Patient states he has been running a high fever at 101, diarrhea and abdominal pain. Symtoms started on 01/11/21. Patient states he has been in quarantine since the 01/11/21 and he missed an meeting due to his symptoms. Patient declined being tested at this time or evaluation at urgent care. Patient is requesting a note stating he was under medical care for 01/11/21-01/21/2. Informed patient he can go to our [facility] to be assess. Patient declined. Patient also requesting a referral a nephrologist for evaluation of kidney due to past kidney stone he states he has been having pain in his back area.

## 2021-03-15 DIAGNOSIS — E78.5 DYSLIPIDEMIA: ICD-10-CM

## 2021-03-15 NOTE — TELEPHONE ENCOUNTER
Requested Prescriptions     Pending Prescriptions Disp Refills    rosuvastatin (CRESTOR) 20 mg tablet 30 Tab 2     Sig: Take 1 Tab by mouth nightly.

## 2021-03-17 RX ORDER — ROSUVASTATIN CALCIUM 20 MG/1
20 TABLET, COATED ORAL
Qty: 90 TAB | Refills: 1 | Status: SHIPPED | OUTPATIENT
Start: 2021-03-17 | End: 2021-05-17 | Stop reason: SDDI

## 2021-03-17 NOTE — TELEPHONE ENCOUNTER
Please see refill request    Patient was last seen on 11-    Last prescribed on 12-  #30 X 2    Thank you

## 2021-03-30 DIAGNOSIS — I10 ESSENTIAL HYPERTENSION: ICD-10-CM

## 2021-03-30 NOTE — TELEPHONE ENCOUNTER
Requested Prescriptions     Pending Prescriptions Disp Refills    hydroCHLOROthiazide (HYDRODIURIL) 25 mg tablet 90 Tab 0     Sig: TAKE 1 TABLET BY MOUTH EVERY DAY no further refills without appointment    candesartan (ATACAND) 32 mg tablet 90 Tab 0     Sig: TAKE 1 TABLET BY MOUTH EVERY DAY no further refills without appointment     Patient requesting the following medication refill         Date last prescribed: 01/04/2021    Date patient last seen: 11/17/20    Follow up appointment scheduled: 05/17/2021    Please Advise

## 2021-03-30 NOTE — TELEPHONE ENCOUNTER
Requested Prescriptions     Pending Prescriptions Disp Refills    hydroCHLOROthiazide (HYDRODIURIL) 25 mg tablet 90 Tab 0     Sig: TAKE 1 TABLET BY MOUTH EVERY DAY no further refills without appointment    candesartan (ATACAND) 32 mg tablet 90 Tab 0     Sig: TAKE 1 TABLET BY MOUTH EVERY DAY no further refills without appointment

## 2021-03-31 RX ORDER — CANDESARTAN 32 MG/1
TABLET ORAL
Qty: 90 TAB | Refills: 0 | Status: SHIPPED | OUTPATIENT
Start: 2021-03-31 | End: 2021-06-28

## 2021-03-31 RX ORDER — HYDROCHLOROTHIAZIDE 25 MG/1
TABLET ORAL
Qty: 90 TAB | Refills: 0 | Status: SHIPPED | OUTPATIENT
Start: 2021-03-31 | End: 2021-06-28

## 2021-05-17 ENCOUNTER — OFFICE VISIT (OUTPATIENT)
Dept: FAMILY MEDICINE CLINIC | Age: 66
End: 2021-05-17
Payer: MEDICARE

## 2021-05-17 ENCOUNTER — HOSPITAL ENCOUNTER (OUTPATIENT)
Dept: LAB | Age: 66
Discharge: HOME | End: 2021-05-17
Payer: MEDICARE

## 2021-05-17 VITALS
DIASTOLIC BLOOD PRESSURE: 72 MMHG | OXYGEN SATURATION: 97 % | TEMPERATURE: 97 F | RESPIRATION RATE: 16 BRPM | SYSTOLIC BLOOD PRESSURE: 128 MMHG | WEIGHT: 238 LBS | BODY MASS INDEX: 31.54 KG/M2 | HEART RATE: 89 BPM | HEIGHT: 73 IN

## 2021-05-17 DIAGNOSIS — G47.30 SLEEP APNEA, UNSPECIFIED TYPE: ICD-10-CM

## 2021-05-17 DIAGNOSIS — I10 ESSENTIAL HYPERTENSION: ICD-10-CM

## 2021-05-17 DIAGNOSIS — D75.1 POLYCYTHEMIA: ICD-10-CM

## 2021-05-17 DIAGNOSIS — R10.13 DYSPEPSIA: ICD-10-CM

## 2021-05-17 DIAGNOSIS — I10 ESSENTIAL HYPERTENSION: Primary | ICD-10-CM

## 2021-05-17 DIAGNOSIS — E78.5 DYSLIPIDEMIA: ICD-10-CM

## 2021-05-17 LAB
ALBUMIN SERPL-MCNC: 4.1 G/DL (ref 3.4–5)
ALBUMIN/GLOB SERPL: 1.2 {RATIO} (ref 0.8–1.7)
ALP SERPL-CCNC: 67 U/L (ref 45–117)
ALT SERPL-CCNC: 28 U/L (ref 16–61)
ANION GAP SERPL CALC-SCNC: 7 MMOL/L (ref 3–18)
AST SERPL-CCNC: 19 U/L (ref 10–38)
BASOPHILS # BLD: 0.1 K/UL (ref 0–0.1)
BASOPHILS NFR BLD: 1 % (ref 0–2)
BILIRUB SERPL-MCNC: 1.1 MG/DL (ref 0.2–1)
BUN SERPL-MCNC: 16 MG/DL (ref 7–18)
BUN/CREAT SERPL: 15 (ref 12–20)
CALCIUM SERPL-MCNC: 8.5 MG/DL (ref 8.5–10.1)
CHLORIDE SERPL-SCNC: 102 MMOL/L (ref 100–111)
CHOLEST SERPL-MCNC: 217 MG/DL
CO2 SERPL-SCNC: 28 MMOL/L (ref 21–32)
CREAT SERPL-MCNC: 1.09 MG/DL (ref 0.6–1.3)
DIFFERENTIAL METHOD BLD: ABNORMAL
EOSINOPHIL # BLD: 0.2 K/UL (ref 0–0.4)
EOSINOPHIL NFR BLD: 2 % (ref 0–5)
ERYTHROCYTE [DISTWIDTH] IN BLOOD BY AUTOMATED COUNT: 13.6 % (ref 11.6–14.5)
GLOBULIN SER CALC-MCNC: 3.3 G/DL (ref 2–4)
GLUCOSE SERPL-MCNC: 110 MG/DL (ref 74–99)
HCT VFR BLD AUTO: 50.3 % (ref 36–48)
HDLC SERPL-MCNC: 35 MG/DL (ref 40–60)
HDLC SERPL: 6.2 {RATIO} (ref 0–5)
HGB BLD-MCNC: 17.2 G/DL (ref 13–16)
LDLC SERPL CALC-MCNC: 162 MG/DL (ref 0–100)
LIPID PROFILE,FLP: ABNORMAL
LYMPHOCYTES # BLD: 2.2 K/UL (ref 0.9–3.6)
LYMPHOCYTES NFR BLD: 25 % (ref 21–52)
MCH RBC QN AUTO: 29.4 PG (ref 24–34)
MCHC RBC AUTO-ENTMCNC: 34.2 G/DL (ref 31–37)
MCV RBC AUTO: 86 FL (ref 74–97)
MONOCYTES # BLD: 0.8 K/UL (ref 0.05–1.2)
MONOCYTES NFR BLD: 9 % (ref 3–10)
NEUTS SEG # BLD: 5.5 K/UL (ref 1.8–8)
NEUTS SEG NFR BLD: 62 % (ref 40–73)
PLATELET # BLD AUTO: 214 K/UL (ref 135–420)
PMV BLD AUTO: 11.3 FL (ref 9.2–11.8)
POTASSIUM SERPL-SCNC: 3.4 MMOL/L (ref 3.5–5.5)
PROT SERPL-MCNC: 7.4 G/DL (ref 6.4–8.2)
RBC # BLD AUTO: 5.85 M/UL (ref 4.35–5.65)
SODIUM SERPL-SCNC: 137 MMOL/L (ref 136–145)
TRIGL SERPL-MCNC: 100 MG/DL (ref ?–150)
VLDLC SERPL CALC-MCNC: 20 MG/DL
WBC # BLD AUTO: 8.8 K/UL (ref 4.6–13.2)

## 2021-05-17 PROCEDURE — G8754 DIAS BP LESS 90: HCPCS | Performed by: FAMILY MEDICINE

## 2021-05-17 PROCEDURE — 36415 COLL VENOUS BLD VENIPUNCTURE: CPT | Performed by: FAMILY MEDICINE

## 2021-05-17 PROCEDURE — G8417 CALC BMI ABV UP PARAM F/U: HCPCS | Performed by: FAMILY MEDICINE

## 2021-05-17 PROCEDURE — G8536 NO DOC ELDER MAL SCRN: HCPCS | Performed by: FAMILY MEDICINE

## 2021-05-17 PROCEDURE — 99213 OFFICE O/P EST LOW 20 MIN: CPT | Performed by: FAMILY MEDICINE

## 2021-05-17 PROCEDURE — G8510 SCR DEP NEG, NO PLAN REQD: HCPCS | Performed by: FAMILY MEDICINE

## 2021-05-17 PROCEDURE — 80053 COMPREHEN METABOLIC PANEL: CPT

## 2021-05-17 PROCEDURE — G8427 DOCREV CUR MEDS BY ELIG CLIN: HCPCS | Performed by: FAMILY MEDICINE

## 2021-05-17 PROCEDURE — G8752 SYS BP LESS 140: HCPCS | Performed by: FAMILY MEDICINE

## 2021-05-17 PROCEDURE — 85025 COMPLETE CBC W/AUTO DIFF WBC: CPT

## 2021-05-17 PROCEDURE — 80061 LIPID PANEL: CPT

## 2021-05-17 PROCEDURE — 1101F PT FALLS ASSESS-DOCD LE1/YR: CPT | Performed by: FAMILY MEDICINE

## 2021-05-17 PROCEDURE — 3017F COLORECTAL CA SCREEN DOC REV: CPT | Performed by: FAMILY MEDICINE

## 2021-05-17 NOTE — PROGRESS NOTES
Chief Complaint   Patient presents with    Follow Up Chronic Condition     HTN     1. Have you been to the ER, urgent care clinic since your last visit? Hospitalized since your last visit? No    2. Have you seen or consulted any other health care providers outside of the [address] [place] [address] since your last visit? Include any pap smears or colon screening.  No

## 2021-05-17 NOTE — PROGRESS NOTES
BAILEY Lin comes in for f/u care. HTN: Patient has hypertension. Blood pressure is stable. He denies headache, changes in vision or focal weakness. He is on HCTZ and Atacand. I will send in a refill of medication. We will continue with the current treatment plan. We will recheck labs. Dyslipidemia: Patient has dyslipidemia. He is doing lifestyle and dietary modification. He will exercise and take a diet low in polysaturated fats. I will recheck lipid panel. BPH/LUTS: Patient has a history of BPH and lower urinary tract symptoms. Has urgency, hesitancy and occasionally post micturition dribbling. He did have some urinary incontinence. Has had the UroLift procedure done. Prior to this he was having multiple UTIs. After the procedure frequency of UTIs has reduced markedly. He continues to do self-catheterization. He will follow up with a urologist.  Abdominal pain: Patient has abdominal pain that comes on and off. This is generalized. He denies nausea or vomiting. No fever, chills, abdominal distention or blood in the stool. He has been seen by the gastroenterologist.  He has a lot of gaseous dyspepsia and bloating sensation. He denies epigastric discomfort or heartburn. Still having evaluation done by the gastroenterologist.  We will request the records. Sleep apnea: Patient has a history of sleep apnea. He has been followed up with a sleep specialist.  Denies daytime somnolence. Obesity: Patient has a BMI of 31.40. We will intensify lifestyle and dietary modification. Polycythemia: Patient has a history of polycythemia. We will recheck CBC.       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 Dr. Miracle Link at [facility].  HX UROLOGICAL  10/28/2019    Cystoscopy and UroLift (transprostatic implants # 7). Dr. Ginger Lindsay at [facility] 45 FLX DX W/COLLJ SPEC WHEN PFRMD          Medications  Current Outpatient Medications   Medication Sig Dispense Refill    hydroCHLOROthiazide (HYDRODIURIL) 25 mg tablet TAKE 1 TABLET BY MOUTH EVERY DAY no further refills without appointment 90 Tab 0    candesartan (ATACAND) 32 mg tablet TAKE 1 TABLET BY MOUTH EVERY DAY no further refills without appointment 90 Tab 0    nitrofurantoin, macrocrystal-monohydrate, (MACROBID) 100 mg capsule Take 1 Cap by mouth two (2) times a day. Take as directed when symptoms are present. 42 Cap 0    loratadine (CLARITIN) 10 mg tablet Take 10 mg by mouth.  cholecalciferol (VITAMIN D3) 1,000 unit cap Take  by mouth daily.  cyanocobalamin (VITAMIN B-12) 1,000 mcg tablet Take 1,000 mcg by mouth daily.  multivitamin (ONE A DAY) tablet Take 1 Tab by mouth daily.  rosuvastatin (CRESTOR) 20 mg tablet Take 1 Tab by mouth nightly. 90 Tab 1    ascorbic acid, vitamin C, (Vitamin C) 500 mg tablet Take  by mouth.  acetaminophen (TYLENOL) 500 mg tablet Take  by mouth every six (6) hours as needed for Pain.          Allergies  Allergies   Allergen Reactions    Penicillins Other (comments)     Pt says he isn't allergic to pcn    Scallops Nausea and Vomiting       Family History  Family History   Problem Relation Age of Onset    Cancer Mother     Hypertension Mother     Heart Disease Father     Diabetes Brother        Social History  Social History     Socioeconomic History    Marital status:      Spouse name: Not on file    Number of children: Not on file    Years of education: Not on file    Highest education level: Not on file   Occupational History    Not on file   Social Needs    Financial resource strain: Not on file    Food insecurity     Worry: Not on file     Inability: Not on file   [employer] needs     Medical: Not on file     Non-medical: Not on file   Tobacco Use    Smoking status: Never Smoker    Smokeless tobacco: Never Used   Substance and Sexual Activity    Alcohol use: No     Comment: Occasionally    Drug use: No    Sexual activity: Yes   Lifestyle    Physical activity     Days per week: Not on file     Minutes per session: Not on file    Stress: Not on file   Relationships    Social connections     Talks on phone: Not on file     Gets together: Not on file     Attends Yarsani service: Not on file     Active member of club or organization: Not on file     Attends meetings of clubs or organizations: Not on file     Relationship status: Not on file    Intimate partner violence     Fear of current or ex partner: Not on file     Emotionally abused: Not on file     Physically abused: Not on file     Forced sexual activity: Not on file   Other Topics Concern    Not on file   Social History Narrative    Not on file       Review of Systems  Review of Systems - Review of all systems is negative except as noted above in the HPI.     Vital Signs  Visit Vitals  /72 (BP 1 Location: Left upper arm, BP Patient Position: Sitting, BP Cuff Size: Adult)   Pulse 89   Temp 97 °F (36.1 °C) (Oral)   Resp 16   Ht 6' 1\" (1.854 m)   Wt 238 lb (108 kg)   SpO2 97%   BMI 31.40 kg/m²         Physical Exam  Physical Examination: General appearance - alert, well appearing, and in no distress, oriented to person, place, and time and overweight  Mental status - alert, oriented to person, place, and time, affect appropriate to mood  Neck - supple, no significant adenopathy  Lymphatics - no palpable lymphadenopathy, no hepatosplenomegaly  Chest - clear to auscultation, no wheezes, rales or rhonchi, symmetric air entry  Heart - normal rate and regular rhythm, S1 and S2 normal  Abdomen - no rebound tenderness noted  Back exam - limited range of motion  Neurological - motor and sensory grossly normal bilaterally  Musculoskeletal - no muscular tenderness noted  Extremities - intact peripheral pulses          Results  Results for orders placed or performed in visit on 12/01/20   AMB POC URINALYSIS DIP STICK AUTO W/O MICRO   Result Value Ref Range    Color (UA POC) Yellow     Clarity (UA POC) Clear     Glucose (UA POC) Negative Negative    Bilirubin (UA POC) Negative Negative    Ketones (UA POC) Negative Negative    Specific gravity (UA POC) 1.020 1.001 - 1.035    Blood (UA POC) 1+ Negative    pH (UA POC) 6.0 4.6 - 8.0    Protein (UA POC) Negative Negative    Urobilinogen (UA POC) 0.2 mg/dL 0.2 - 1    Nitrites (UA POC) Negative Negative    Leukocyte esterase (UA POC) 1+ Negative       ASSESSMENT and PLAN    ICD-10-CM ICD-9-CM    1. Essential hypertension  I10 401.9 COLLECTION VENOUS BLOOD,VENIPUNCTURE   2. Dyslipidemia  E78.5 272.4 COLLECTION VENOUS BLOOD,VENIPUNCTURE   3. Sleep apnea, unspecified type  G47.30 780.57    4. Dyspepsia  R10.13 536.8    5. Polycythemia  D75.1 238.4 COLLECTION VENOUS BLOOD,VENIPUNCTURE     lab results and schedule of future lab studies reviewed with patient  reviewed diet, exercise and weight control  cardiovascular risk and specific lipid/LDL goals reviewed  reviewed medications and side effects in detail      I have discussed the diagnosis with the patient and the intended plan of care as seen in the above orders.  The patient has received an after-visit summary and questions were answered concerning future plans. I have discussed medication, side effects, and warnings with the patient in detail. The patient verbalized understanding and is in agreement with the plan of care. The patient will contact the office with any additional concerns. Kalpana Vick MD    PLEASE NOTE:   This document has been produced using voice recognition software.  Unrecognized errors in transcription may be present

## 2021-05-25 RX ORDER — POTASSIUM CHLORIDE 750 MG/1
10 TABLET, EXTENDED RELEASE ORAL DAILY
Qty: 30 TABLET | Refills: 3 | Status: SHIPPED | OUTPATIENT
Start: 2021-05-25 | End: 2022-01-27

## 2021-05-25 RX ORDER — ATORVASTATIN CALCIUM 20 MG/1
20 TABLET, FILM COATED ORAL DAILY
Qty: 30 TABLET | Refills: 3 | Status: SHIPPED | OUTPATIENT
Start: 2021-05-25 | End: 2021-11-17

## 2021-05-25 NOTE — PROGRESS NOTES
Please let patient know his potassium is low at 3.4. He is on HCTZ and this can lower his potassium. I will send in potassium to take daily with the HCTZ. His LDL cholesterol is elevated. He needs to be on cholesterol-lowering medication. I will send in Lipitor to take daily. He should exercise and take a diet low in polysaturated fats. Recheck labs in 3 to 6 months. His hemoglobin was slightly elevated at 17.2. We will recheck this at next visit.   Zoila Marie MD

## 2021-06-28 DIAGNOSIS — I10 ESSENTIAL HYPERTENSION: ICD-10-CM

## 2021-06-28 RX ORDER — HYDROCHLOROTHIAZIDE 25 MG/1
TABLET ORAL
Qty: 90 TABLET | Refills: 0 | Status: SHIPPED | OUTPATIENT
Start: 2021-06-28 | End: 2021-09-27

## 2021-06-28 RX ORDER — CANDESARTAN 32 MG/1
TABLET ORAL
Qty: 90 TABLET | Refills: 0 | Status: SHIPPED | OUTPATIENT
Start: 2021-06-28 | End: 2022-03-03 | Stop reason: SDUPTHER

## 2021-06-28 RX ORDER — CANDESARTAN 32 MG/1
TABLET ORAL
Qty: 90 TABLET | Refills: 0 | Status: SHIPPED | OUTPATIENT
Start: 2021-06-28 | End: 2021-09-27

## 2021-11-17 ENCOUNTER — OFFICE VISIT (OUTPATIENT)
Dept: FAMILY MEDICINE CLINIC | Age: 66
End: 2021-11-17
Payer: MEDICARE

## 2021-11-17 VITALS
WEIGHT: 238 LBS | DIASTOLIC BLOOD PRESSURE: 85 MMHG | RESPIRATION RATE: 18 BRPM | SYSTOLIC BLOOD PRESSURE: 134 MMHG | TEMPERATURE: 97.6 F | HEART RATE: 85 BPM | HEIGHT: 73 IN | OXYGEN SATURATION: 94 % | BODY MASS INDEX: 31.54 KG/M2

## 2021-11-17 DIAGNOSIS — N40.1 BENIGN LOCALIZED PROSTATIC HYPERPLASIA WITH LOWER URINARY TRACT SYMPTOMS (LUTS): ICD-10-CM

## 2021-11-17 DIAGNOSIS — U07.1 COVID-19: ICD-10-CM

## 2021-11-17 DIAGNOSIS — I10 ESSENTIAL HYPERTENSION: Primary | ICD-10-CM

## 2021-11-17 DIAGNOSIS — E78.5 DYSLIPIDEMIA: ICD-10-CM

## 2021-11-17 DIAGNOSIS — Z71.89 ADVANCED DIRECTIVES, COUNSELING/DISCUSSION: ICD-10-CM

## 2021-11-17 DIAGNOSIS — Z00.00 INITIAL MEDICARE ANNUAL WELLNESS VISIT: ICD-10-CM

## 2021-11-17 DIAGNOSIS — R97.20 ELEVATED PSA: ICD-10-CM

## 2021-11-17 DIAGNOSIS — G47.30 SLEEP APNEA, UNSPECIFIED TYPE: ICD-10-CM

## 2021-11-17 DIAGNOSIS — D75.1 POLYCYTHEMIA: ICD-10-CM

## 2021-11-17 PROCEDURE — G8536 NO DOC ELDER MAL SCRN: HCPCS | Performed by: FAMILY MEDICINE

## 2021-11-17 PROCEDURE — 99214 OFFICE O/P EST MOD 30 MIN: CPT | Performed by: FAMILY MEDICINE

## 2021-11-17 PROCEDURE — G8510 SCR DEP NEG, NO PLAN REQD: HCPCS | Performed by: FAMILY MEDICINE

## 2021-11-17 PROCEDURE — G8427 DOCREV CUR MEDS BY ELIG CLIN: HCPCS | Performed by: FAMILY MEDICINE

## 2021-11-17 PROCEDURE — G8754 DIAS BP LESS 90: HCPCS | Performed by: FAMILY MEDICINE

## 2021-11-17 PROCEDURE — G8752 SYS BP LESS 140: HCPCS | Performed by: FAMILY MEDICINE

## 2021-11-17 PROCEDURE — G0438 PPPS, INITIAL VISIT: HCPCS | Performed by: FAMILY MEDICINE

## 2021-11-17 PROCEDURE — 1101F PT FALLS ASSESS-DOCD LE1/YR: CPT | Performed by: FAMILY MEDICINE

## 2021-11-17 PROCEDURE — G8417 CALC BMI ABV UP PARAM F/U: HCPCS | Performed by: FAMILY MEDICINE

## 2021-11-17 PROCEDURE — 3017F COLORECTAL CA SCREEN DOC REV: CPT | Performed by: FAMILY MEDICINE

## 2021-11-17 PROCEDURE — 99497 ADVNCD CARE PLAN 30 MIN: CPT | Performed by: FAMILY MEDICINE

## 2021-11-17 NOTE — PATIENT INSTRUCTIONS
Medicare Wellness Visit, Male    The best way to live healthy is to have a lifestyle where you eat a well-balanced diet, exercise regularly, limit alcohol use, and quit all forms of tobacco/nicotine, if applicable. Regular preventive services are another way to keep healthy. Preventive services (vaccines, screening tests, monitoring & exams) can help personalize your care plan, which helps you manage your own care. Screening tests can find health problems at the earliest stages, when they are easiest to treat. [facility] follows the current, evidence-based guidelines published by the [facility] Lad[facility] ([facility]) when recommending preventive services for our patients. Because we follow these guidelines, sometimes recommendations change over time as research supports it. (For example, a prostate screening blood test is no longer routinely recommended for men with no symptoms). Of course, you and your doctor may decide to screen more often for some diseases, based on your risk and co-morbidities (chronic disease you are already diagnosed with). Preventive services for you include:  - Medicare offers their members a free annual wellness visit, which is time for you and your primary care provider to discuss and plan for your preventive service needs. Take advantage of this benefit every year!  -All adults over age 72 should receive the recommended pneumonia vaccines. Current USPSTF guidelines recommend a series of two vaccines for the best pneumonia protection.   -All adults should have a flu vaccine yearly and tetanus vaccine every 10 years.  -All adults age 48 and older should receive the shingles vaccines (series of two vaccines).        -All adults age 38-68 who are overweight should have a diabetes screening test once every three years.   -Other screening tests & preventive services for persons with diabetes include: an eye exam to screen for diabetic retinopathy, a kidney function test, a foot exam, and stricter control over your cholesterol.   -Cardiovascular screening for adults with routine risk involves an electrocardiogram (ECG) at intervals determined by the provider.   -Colorectal cancer screening should be done for adults age 54-65 with no increased risk factors for colorectal cancer. There are a number of acceptable methods of screening for this type of cancer. Each test has its own benefits and drawbacks. Discuss with your provider what is most appropriate for you during your annual wellness visit. The different tests include: colonoscopy (considered the best screening method), a fecal occult blood test, a fecal DNA test, and sigmoidoscopy.  -All adults born between [facility] should be screened once for Hepatitis C.  -An Abdominal Aortic Aneurysm (AAA) Screening is recommended for men age 73-68 who has ever smoked in their lifetime. Here is a list of your current Health Maintenance items (your personalized list of preventive services) with a due date:  Health Maintenance Due   Topic Date Due    COVID-19 Vaccine (1) Never done    Shingles Vaccine (1 of 2) Never done    Yearly Flu Vaccine (1) Never done    Annual Well Visit  11/18/2021     Medicare Wellness Visit, Male    The best way to live healthy is to have a lifestyle where you eat a well-balanced diet, exercise regularly, limit alcohol use, and quit all forms of tobacco/nicotine, if applicable. Regular preventive services are another way to keep healthy. Preventive services (vaccines, screening tests, monitoring & exams) can help personalize your care plan, which helps you manage your own care. Screening tests can find health problems at the earliest stages, when they are easiest to treat.    [facility] follows the current, evidence-based guidelines published by the [facility] ([facility]STF) when recommending preventive services for our patients. Because we follow these guidelines, sometimes recommendations change over time as research supports it. (For example, a prostate screening blood test is no longer routinely recommended for men with no symptoms). Of course, you and your doctor may decide to screen more often for some diseases, based on your risk and co-morbidities (chronic disease you are already diagnosed with). Preventive services for you include:  - Medicare offers their members a free annual wellness visit, which is time for you and your primary care provider to discuss and plan for your preventive service needs. Take advantage of this benefit every year!  -All adults over age 72 should receive the recommended pneumonia vaccines. Current USPSTF guidelines recommend a series of two vaccines for the best pneumonia protection.   -All adults should have a flu vaccine yearly and tetanus vaccine every 10 years.  -All adults age 48 and older should receive the shingles vaccines (series of two vaccines). -All adults age 38-68 who are overweight should have a diabetes screening test once every three years.   -Other screening tests & preventive services for persons with diabetes include: an eye exam to screen for diabetic retinopathy, a kidney function test, a foot exam, and stricter control over your cholesterol.   -Cardiovascular screening for adults with routine risk involves an electrocardiogram (ECG) at intervals determined by the provider.   -Colorectal cancer screening should be done for adults age 54-65 with no increased risk factors for colorectal cancer. There are a number of acceptable methods of screening for this type of cancer. Each test has its own benefits and drawbacks. Discuss with your provider what is most appropriate for you during your annual wellness visit.  The different tests include: colonoscopy (considered the best screening method), a fecal occult blood test, a fecal DNA test, and sigmoidoscopy.  -All adults born between [birth year] and 1965 should be screened once for Hepatitis C.  -An Abdominal Aortic Aneurysm (AAA) Screening is recommended for men age 73-68 who has ever smoked in their lifetime.      Here is a list of your current Health Maintenance items (your personalized list of preventive services) with a due date:  Health Maintenance Due   Topic Date Due    COVID-19 Vaccine (1) Never done

## 2021-11-17 NOTE — PROGRESS NOTES
This is an Initial Medicare Annual Wellness Exam (AWV) (Performed 12 months after IPPE or effective date of Medicare Part B enrollment, Once in a lifetime)    I have reviewed the patient's medical history in detail and updated the computerized patient record. Chief Complaint   Patient presents with   [facility] Annual Wellness Visit     1. \"Have you been to the ER, urgent care clinic since your last visit? Hospitalized since your last visit? \" No    2. \"Have you seen or consulted any other health care providers outside of the [address] [place] [address] since your last visit? \" No     3. For patients aged 39-70: Has the patient had a colonoscopy? Yes, HM satisfied with blue hyperlink     I  f the patient is female:    4. For patients aged 41-77: Has the patient had a mammogram within the past 2 years? No    5. For patients aged 21-65: Has the patient had a pap smear? No      Assessment/Plan   Education and counseling provided:  Are appropriate based on today's review and evaluation    1. Initial Medicare annual wellness visit    2.  Advanced directives, counseling/discussion  - ADVANCE CARE PLANNING FIRST 30 MINS  - FULL CODE       Depression Risk Factor Screening     3 most recent PHQ Screens 11/17/2021   Little interest or pleasure in doing things Not at all   Feeling down, depressed, irritable, or hopeless Not at all   Total Score PHQ 2 0   Trouble falling or staying asleep, or sleeping too much More than half the days   Feeling tired or having little energy Several days   Poor appetite, weight loss, or overeating Not at all   Feeling bad about yourself - or that you are a failure or have let yourself or your family down Not at all   Trouble concentrating on things such as school, work, reading, or watching TV Not at all   Moving or speaking so slowly that other people could have noticed; or the opposite being so fidgety that others notice Not at all   Thoughts of being better off dead, or hurting yourself in some way Not at all   PHQ 9 Score 3   How difficult have these problems made it for you to do your work, take care of your home and get along with others Not difficult at all   8 minutes taken on depression screening. Alcohol Risk Screen    Do you average more than 1 drink per night or more than 7 drinks a week: No    In the past three months have you have had more than 4 drinks containing alcohol on one occasion: Yes         Functional Ability and Level of Safety     1. Was the patient's timed Up and GO test unsteady or longer than 30 seconds? No  2. Does the patient need help with the phone, transportation, shopping, preparing meals, housework, laundry, medications or managing money? No  3. Does the patients' home have rugs in the hallway, lack grab bars in the bathroom, lack handrails on the stairs or have poor lighting? No  4. Have you noticed any hearing difficulties? No  Hearing Evaluation:      Hearing: Hearing is good. Activities of Daily Living: The home contains: no safety equipment. Patient does total self care     Ambulation: with no difficulty      Fall Risk:  Fall Risk Assessment, last 12 mths 11/17/2021   Able to walk? Yes   Fall in past 12 months? 0   Do you feel unsteady?  0   Are you worried about falling 0      Abuse Screen:  Patient is not abused       Cognitive Screening    Has your family/caregiver stated any concerns about your memory: no     Cognitive Screening: Normal - Verbal Fluency Test    Health Maintenance Due     Health Maintenance Due   Topic Date Due    COVID-19 Vaccine (1) Never done    Shingrix Vaccine Age 50> (1 of 2) Never done    Flu Vaccine (1) Never done    Medicare Yearly Exam  11/18/2021       Patient Care Team   Patient Care Team:  Robson Hector MD as PCP - General (Family Medicine)  Robson Hector MD as PCP - [facility] Empaneled Provider  Shellie Felix MD (Urology)    History   Mearl Bills comes in for [facility] wellness exam.    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     Past Medical History:   Diagnosis Date    Atonic bladder     Bladder stone     BPH with obstruction/lower urinary tract symptoms     Elevated PSA     Hematuria 4/9/2012    HLD (hyperlipidemia)     Hydronephrosis     Hypercholesteremia     Hypertension     Kidney stones     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 Dr. Scarlet Perez at [facility].   UROLOGICAL  10/28/2019    Cystoscopy and UroLift (transprostatic implants # 7). Dr. Shukri Serrato at [facility] 45 FLX DX W/COLLJ SPEC WHEN PFRMD       Current Outpatient Medications   Medication Sig Dispense Refill    hydroCHLOROthiazide (HYDRODIURIL) 25 mg tablet TAKE 1 TABLET BY MOUTH EVRY DAY 90 Tablet 1    candesartan (ATACAND) 32 mg tablet TAKE 1 TABLET BY MOUTH EVERY DAY 90 Tablet 1    candesartan (ATACAND) 32 mg tablet TAKE 1 TABLET BY MOUTH EVERY DAY 90 Tablet 0    potassium chloride (KLOR-CON) 10 mEq tablet Take 1 Tablet by mouth daily.  30 Tablet 3    atorvastatin (LIPITOR) 20 mg tablet Take 1 Tablet by mouth daily. 30 Tablet 3    nitrofurantoin, macrocrystal-monohydrate, (MACROBID) 100 mg capsule Take 1 Cap by mouth two (2) times a day. Take as directed when symptoms are present. 42 Cap 0    acetaminophen (TYLENOL) 500 mg tablet Take  by mouth every six (6) hours as needed for Pain.  cholecalciferol (VITAMIN D3) 1,000 unit cap Take  by mouth daily.  cyanocobalamin (VITAMIN B-12) 1,000 mcg tablet Take 1,000 mcg by mouth daily.  multivitamin (ONE A DAY) tablet Take 1 Tab by mouth daily. Allergies   Allergen Reactions    Penicillins Other (comments)     Pt says he isn't allergic to pcn    Scallops Nausea and Vomiting       Family History   Problem Relation Age of Onset   Dossie Corine Cancer Mother     Hypertension Mother     Heart Disease Father     Diabetes Brother      Social History     Tobacco Use    Smoking status: Never Smoker    Smokeless tobacco: Never Used   Substance Use Topics    Alcohol use: No     Comment: Occasionally   I have discussed the diagnosis with the patient and the intended plan of care as seen in the above orders. The patient has received an after-visit summary and questions were answered concerning future plans. I have discussed medication, side effects, and warnings with the patient in detail. The patient verbalized understanding and is in agreement with the plan of care. The patient will contact the office with any additional concerns. Personalized preventative plan of care was discussed, printed and given to patient.     All Pagan MD

## 2021-11-17 NOTE — ACP (ADVANCE CARE PLANNING)
Advance Care Planning     Advance Care Planning (ACP) Physician/NP/PA Conversation      Date of Conversation: 11/17/2021  Conducted with: Patient with Decision Making Capacity    Healthcare Decision Maker:     Primary Decision Maker (Active): [facility] - [phone]  Click here to complete [address] including selection of the Healthcare Decision Maker Relationship (ie \"Primary\")      Today we documented Decision Maker(s). The patient will provide ACP documents. Care Preferences:    Hospitalization: \"If your health worsens and it becomes clear that your chance of recovery is unlikely, what would be your preference regarding hospitalization? \"  The patient would prefer hospitalization. Ventilation: \"If you were unable to breathe on your own and your chance of recovery was unlikely, what would be your preference about the use of a ventilator (breathing machine) if it was available to you? \"   The patient would desire the use of a ventilator. Resuscitation: \"In the event your heart stopped as a result of an underlying serious health condition, would you want attempts to be made to restart your heart, or would you prefer a natural death? \"   Yes, attempt to resuscitate.     Additional topics discussed: treatment goals, ventilation preferences, hospitalization preferences and resuscitation preferences    Conversation Outcomes / Follow-Up Plan:   ACP in process - completing/providing documents   Reviewed DNR/DNI and patient elects Full Code (Attempt Resuscitation)     Length of Voluntary ACP Conversation in minutes:  16 minutes    Nat Pedro MD

## 2021-11-17 NOTE — PROGRESS NOTES
HPI  Gina Schroeder comes in for f/u care. Sleep apnea: Patient has sleep apnea. Has been seen at the sleep clinic. His settings on the CPAP machine have been adjusted to eliminate aerophagia that he has been having. Feels more comfortable and sleeps better. He will continue with the current management plan. Polycythemia: Patient has a history of polycythemia. We will recheck his CBC levels. HTN: Patient has hypertension. He is on HCTZ and candesartan. Stable on the medication. I will send in a refill of the same. We will check labs. He denies headache, changes in vision or focal weakness. Dyslipidemia: Patient has dyslipidemia. He does exercise and take a diet low in polysaturated fats. He is not on Lipitor. We will recheck labs. Elevated PSA: Patient has a history of elevated PSA. I will recheck labs. LUTS: Patient has no urinary tract symptoms. He has atonic bladder. He does do self catheterizations. He has been followed up by the urologist and he uses Macrobid as needed. Obesity: Patient has a BMI of 31.40. He will intensify lifestyle and dietary modification. COVID-19: Patient requests to have COVID-19 antibodies checked. Health maintenance: Patient declines to have the flu vaccine. Declines to have the shingles vaccine.       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 Dr. Irvine Litten at [facility].   UROLOGICAL  10/28/2019    Cystoscopy and UroLift (transprostatic implants # 7). Dr. Kota Daniels at [facility] 45 FLX DX W/COLLJ SPEC WHEN PFRMD          Medications  Current Outpatient Medications   Medication Sig Dispense Refill    hydroCHLOROthiazide (HYDRODIURIL) 25 mg tablet TAKE 1 TABLET BY MOUTH EVRY DAY 90 Tablet 1    candesartan (ATACAND) 32 mg tablet TAKE 1 TABLET BY MOUTH EVERY DAY 90 Tablet 1    candesartan (ATACAND) 32 mg tablet TAKE 1 TABLET BY MOUTH EVERY DAY 90 Tablet 0    potassium chloride (KLOR-CON) 10 mEq tablet Take 1 Tablet by mouth daily. 30 Tablet 3    atorvastatin (LIPITOR) 20 mg tablet Take 1 Tablet by mouth daily. 30 Tablet 3    nitrofurantoin, macrocrystal-monohydrate, (MACROBID) 100 mg capsule Take 1 Cap by mouth two (2) times a day. Take as directed when symptoms are present. 42 Cap 0    acetaminophen (TYLENOL) 500 mg tablet Take  by mouth every six (6) hours as needed for Pain.  cholecalciferol (VITAMIN D3) 1,000 unit cap Take  by mouth daily.  cyanocobalamin (VITAMIN B-12) 1,000 mcg tablet Take 1,000 mcg by mouth daily.  multivitamin (ONE A DAY) tablet Take 1 Tab by mouth daily.          Allergies  Allergies   Allergen Reactions    Penicillins Other (comments)     Pt says he isn't allergic to pcn    Scallops Nausea and Vomiting       Family History  Family History   Problem Relation Age of Onset    Cancer Mother     Hypertension Mother     Heart Disease Father     Diabetes Brother        Social History  Social History     Socioeconomic History    Marital status:      Spouse name: Not on file    Number of children: Not on file    Years of education: Not on file    Highest education level: Not on file   Occupational History    Not on file   Tobacco Use    Smoking status: Never Smoker    Smokeless tobacco: Never Used   Vaping Use    Vaping Use: Never used   Substance and Sexual Activity    Alcohol use: No     Comment: Occasionally    Drug use: No    Sexual activity: Yes   Other Topics Concern    Not on file   Social History Narrative    Not on file     Social Determinants of Health     Financial Resource Strain:     Difficulty of Paying Living Expenses: Not on file   Food Insecurity:     Worried About Running Out of Food in the Last Year: Not on file    Ca of Food in the Last Year: Not on file   Transportation Needs:     Lack of Transportation (Medical): Not on file    Lack of Transportation (Non-Medical): Not on file   Physical Activity:     Days of Exercise per Week: Not on file    Minutes of Exercise per Session: Not on file   Stress:     Feeling of Stress : Not on file   Social Connections:     Frequency of Communication with Friends and Family: Not on file    Frequency of Social Gatherings with Friends and Family: Not on file    Attends Church Services: Not on file    Active Member of [address] [place] [address] or Organizations: Not on file    Attends Club or Organization Meetings: Not on file    Marital Status: Not on file   Intimate Partner Violence:     Fear of Current or Ex-Partner: Not on file    Emotionally Abused: Not on file    Physically Abused: Not on file    Sexually Abused: Not on file   Housing Stability:     Unable to Pay for Housing in the Last Year: Not on file    Number of [place] in the Last Year: Not on file    Unstable Housing in the Last Year: Not on file       Review of Systems  Review of Systems - Review of all systems is negative except as noted above in the HPI.     Vital Signs  Visit Vitals  /85   Pulse 85   Temp 97.6 °F (36.4 °C) (Oral)   Resp 18   Ht 6' 1\" (1.854 m)   Wt 238 lb (108 kg)   SpO2 94%   BMI 31.40 kg/m²         Physical Exam  Physical Examination: General appearance - oriented to person, place, and time, overweight, acyanotic, in no respiratory distress and well hydrated  Mental status - alert, oriented to person, place, and time, normal mood, behavior, speech, dress, motor activity, and thought processes  Neck - supple, no significant adenopathy  Lymphatics - no palpable lymphadenopathy, no hepatosplenomegaly  Chest - clear to auscultation, no wheezes, rales or rhonchi, symmetric air entry  Heart - normal rate, regular rhythm, normal S1, S2, no murmurs, rubs, clicks or gallops  Abdomen - soft, nontender, nondistended, no masses or organomegaly  Back exam - limited range of motion  Neurological - motor and sensory grossly normal bilaterally  Musculoskeletal - osteoarthritic changes noted in both hands  Extremities - no pedal edema noted, intact peripheral pulses      Results  Results for orders placed or performed during the hospital encounter of 90/96/27   METABOLIC PANEL, COMPREHENSIVE   Result Value Ref Range    Sodium 137 136 - 145 mmol/L    Potassium 3.4 (L) 3.5 - 5.5 mmol/L    Chloride 102 100 - 111 mmol/L    CO2 28 21 - 32 mmol/L    Anion gap 7 3.0 - 18 mmol/L    Glucose 110 (H) 74 - 99 mg/dL    BUN 16 7.0 - 18 MG/DL    Creatinine 1.09 0.6 - 1.3 MG/DL    BUN/Creatinine ratio 15 12 - 20      GFR est AA >60 >60 ml/min/1.73m2    GFR est non-AA >60 >60 ml/min/1.73m2    Calcium 8.5 8.5 - 10.1 MG/DL    Bilirubin, total 1.1 (H) 0.2 - 1.0 MG/DL    ALT (SGPT) 28 16 - 61 U/L    AST (SGOT) 19 10 - 38 U/L    Alk.  phosphatase 67 45 - 117 U/L    Protein, total 7.4 6.4 - 8.2 g/dL    Albumin 4.1 3.4 - 5.0 g/dL    Globulin 3.3 2.0 - 4.0 g/dL    A-G Ratio 1.2 0.8 - 1.7     LIPID PANEL   Result Value Ref Range    LIPID PROFILE          Cholesterol, total 217 (H) <200 MG/DL    Triglyceride 100 <150 MG/DL    HDL Cholesterol 35 (L) 40 - 60 MG/DL    LDL, calculated 162 (H) 0 - 100 MG/DL    VLDL, calculated 20 MG/DL    CHOL/HDL Ratio 6.2 (H) 0 - 5.0     CBC WITH AUTOMATED DIFF   Result Value Ref Range    WBC 8.8 4.6 - 13.2 K/uL    RBC 5.85 (H) 4.35 - 5.65 M/uL    HGB 17.2 (H) 13.0 - 16.0 g/dL    HCT 50.3 (H) 36.0 - 48.0 %    MCV 86.0 74.0 - 97.0 FL    MCH 29.4 24.0 - 34.0 PG    MCHC 34.2 31.0 - 37.0 g/dL    RDW 13.6 11.6 - 14.5 %    PLATELET 068 679 - 498 K/uL    MPV 11.3 9.2 - 11.8 FL    NEUTROPHILS 62 40 - 73 %    LYMPHOCYTES 25 21 - 52 %    MONOCYTES 9 3 - 10 %    EOSINOPHILS 2 0 - 5 %    BASOPHILS 1 0 - 2 %    ABS. NEUTROPHILS 5.5 1.8 - 8.0 K/UL    ABS. LYMPHOCYTES 2.2 0.9 - 3.6 K/UL    ABS. MONOCYTES 0.8 0.05 - 1.2 K/UL    ABS. EOSINOPHILS 0.2 0.0 - 0.4 K/UL    ABS. BASOPHILS 0.1 0.0 - 0.1 K/UL    DF AUTOMATED         ASSESSMENT and PLAN    ICD-10-CM ICD-9-CM    1. Essential hypertension  Z35 546.3 METABOLIC PANEL, COMPREHENSIVE   2. Dyslipidemia  E78.5 272.4 LIPID PANEL   3. Sleep apnea, unspecified type  G47.30 780.57    4. Polycythemia  D75.1 238.4 CBC WITH AUTOMATED DIFF   5. Benign localized prostatic hyperplasia with lower urinary tract symptoms (LUTS)  N40.1 600.21      599.69    6. Elevated PSA  R97.20 790.93 PSA, DIAGNOSTIC (PROSTATE SPECIFIC AG)   7. COVID-19  U07.1 079.89 SARS-COV-2 AB, TOTAL   8. Initial Medicare annual wellness visit  Z00.00 V70.0    9. Advanced directives, counseling/discussion  Z71.89 V65.49 ADVANCE CARE PLANNING FIRST 30 MINS      FULL CODE     lab results and schedule of future lab studies reviewed with patient  reviewed diet, exercise and weight control  cardiovascular risk and specific lipid/LDL goals reviewed  reviewed medications and side effects in detail      I have discussed the diagnosis with the patient and the intended plan of care as seen in the above orders. The patient has received an after-visit summary and questions were answered concerning future plans. I have discussed medication, side effects, and warnings with the patient in detail. The patient verbalized understanding and is in agreement with the plan of care.  The patient will contact the office with any additional concerns. Bob Byrd MD    PLEASE NOTE:   This document has been produced using voice recognition software.  Unrecognized errors in transcription may be present

## 2021-12-02 ENCOUNTER — HOSPITAL ENCOUNTER (OUTPATIENT)
Dept: LAB | Age: 66
Discharge: HOME | End: 2021-12-02
Payer: MEDICARE

## 2021-12-02 ENCOUNTER — LAB ONLY (OUTPATIENT)
Dept: FAMILY MEDICINE CLINIC | Age: 66
End: 2021-12-02
Payer: MEDICARE

## 2021-12-02 DIAGNOSIS — I10 ESSENTIAL HYPERTENSION: ICD-10-CM

## 2021-12-02 DIAGNOSIS — U07.1 COVID-19: ICD-10-CM

## 2021-12-02 DIAGNOSIS — D75.1 POLYCYTHEMIA: ICD-10-CM

## 2021-12-02 DIAGNOSIS — Z01.89 ENCOUNTER FOR LABORATORY TEST: Primary | ICD-10-CM

## 2021-12-02 DIAGNOSIS — E78.5 DYSLIPIDEMIA: ICD-10-CM

## 2021-12-02 DIAGNOSIS — R97.20 ELEVATED PSA: ICD-10-CM

## 2021-12-02 LAB
ALBUMIN SERPL-MCNC: 4.3 G/DL (ref 3.4–5)
ALBUMIN/GLOB SERPL: 1.2 {RATIO} (ref 0.8–1.7)
ALP SERPL-CCNC: 68 U/L (ref 45–117)
ALT SERPL-CCNC: 39 U/L (ref 16–61)
ANION GAP SERPL CALC-SCNC: 5 MMOL/L (ref 3–18)
AST SERPL-CCNC: 23 U/L (ref 10–38)
BASOPHILS # BLD: 0.1 K/UL (ref 0–0.1)
BASOPHILS NFR BLD: 1 % (ref 0–2)
BILIRUB SERPL-MCNC: 1 MG/DL (ref 0.2–1)
BUN SERPL-MCNC: 16 MG/DL (ref 7–18)
BUN/CREAT SERPL: 13 (ref 12–20)
CALCIUM SERPL-MCNC: 9.8 MG/DL (ref 8.5–10.1)
CHLORIDE SERPL-SCNC: 101 MMOL/L (ref 100–111)
CHOLEST SERPL-MCNC: 197 MG/DL
CO2 SERPL-SCNC: 32 MMOL/L (ref 21–32)
CREAT SERPL-MCNC: 1.19 MG/DL (ref 0.6–1.3)
DIFFERENTIAL METHOD BLD: ABNORMAL
EOSINOPHIL # BLD: 0.2 K/UL (ref 0–0.4)
EOSINOPHIL NFR BLD: 2 % (ref 0–5)
ERYTHROCYTE [DISTWIDTH] IN BLOOD BY AUTOMATED COUNT: 13.4 % (ref 11.6–14.5)
GLOBULIN SER CALC-MCNC: 3.5 G/DL (ref 2–4)
GLUCOSE SERPL-MCNC: 129 MG/DL (ref 74–99)
HCT VFR BLD AUTO: 54 % (ref 36–48)
HDLC SERPL-MCNC: 34 MG/DL (ref 40–60)
HDLC SERPL: 5.8 {RATIO} (ref 0–5)
HGB BLD-MCNC: 17.6 G/DL (ref 13–16)
IMM GRANULOCYTES # BLD AUTO: 0.1 K/UL (ref 0–0.04)
IMM GRANULOCYTES NFR BLD AUTO: 1 % (ref 0–0.5)
LDLC SERPL CALC-MCNC: 142.6 MG/DL (ref 0–100)
LIPID PROFILE,FLP: ABNORMAL
LYMPHOCYTES # BLD: 2.2 K/UL (ref 0.9–3.6)
LYMPHOCYTES NFR BLD: 21 % (ref 21–52)
MCH RBC QN AUTO: 28.9 PG (ref 24–34)
MCHC RBC AUTO-ENTMCNC: 32.6 G/DL (ref 31–37)
MCV RBC AUTO: 88.7 FL (ref 78–100)
MONOCYTES # BLD: 0.9 K/UL (ref 0.05–1.2)
MONOCYTES NFR BLD: 9 % (ref 3–10)
NEUTS SEG # BLD: 7 K/UL (ref 1.8–8)
NEUTS SEG NFR BLD: 67 % (ref 40–73)
NRBC # BLD: 0 K/UL (ref 0–0.01)
NRBC BLD-RTO: 0 PER 100 WBC
PLATELET # BLD AUTO: 235 K/UL (ref 135–420)
PMV BLD AUTO: 11 FL (ref 9.2–11.8)
POTASSIUM SERPL-SCNC: 3.7 MMOL/L (ref 3.5–5.5)
PROT SERPL-MCNC: 7.8 G/DL (ref 6.4–8.2)
PSA SERPL-MCNC: 5.7 NG/ML (ref 0–4)
RBC # BLD AUTO: 6.09 M/UL (ref 4.35–5.65)
SODIUM SERPL-SCNC: 138 MMOL/L (ref 136–145)
TRIGL SERPL-MCNC: 102 MG/DL (ref ?–150)
VLDLC SERPL CALC-MCNC: 20.4 MG/DL
WBC # BLD AUTO: 10.4 K/UL (ref 4.6–13.2)

## 2021-12-02 PROCEDURE — 86769 SARS-COV-2 COVID-19 ANTIBODY: CPT

## 2021-12-02 PROCEDURE — 36415 COLL VENOUS BLD VENIPUNCTURE: CPT | Performed by: FAMILY MEDICINE

## 2021-12-02 PROCEDURE — 85025 COMPLETE CBC W/AUTO DIFF WBC: CPT

## 2021-12-02 PROCEDURE — 84153 ASSAY OF PSA TOTAL: CPT

## 2021-12-02 PROCEDURE — 80053 COMPREHEN METABOLIC PANEL: CPT

## 2021-12-02 PROCEDURE — 80061 LIPID PANEL: CPT

## 2021-12-02 PROCEDURE — 36415 COLL VENOUS BLD VENIPUNCTURE: CPT

## 2021-12-04 LAB — SARS-COV-2 ABS, NUCLEOCAPSID: NEGATIVE

## 2021-12-05 DIAGNOSIS — E78.00 HYPERCHOLESTEREMIA: Primary | ICD-10-CM

## 2021-12-05 DIAGNOSIS — R73.9 HYPERGLYCEMIA: ICD-10-CM

## 2021-12-05 RX ORDER — ATORVASTATIN CALCIUM 20 MG/1
20 TABLET, FILM COATED ORAL DAILY
Qty: 30 TABLET | Refills: 2 | Status: SHIPPED | OUTPATIENT
Start: 2021-12-05 | End: 2022-05-25

## 2021-12-06 NOTE — PROGRESS NOTES
Please let patient know blood glucose elevated at 129. I will place a request for HbA1c to evaluate for diabetes. His  COVID-19 antibodies are positive indicating he has been in contact with the Covid virus in the past.  LDL cholesterol is elevated. He needs to be on cholesterol-lowering medication. I will send in Lipitor to take daily. His PSA is also elevated at 5.7. He should follow-up with his urologist on this. His hemoglobin is also slightly elevated.   He should continue to follow-up with the hematologist.  Roman Gutierrez MD

## 2021-12-14 ENCOUNTER — TELEPHONE (OUTPATIENT)
Dept: FAMILY MEDICINE CLINIC | Age: 66
End: 2021-12-14

## 2022-01-27 RX ORDER — POTASSIUM CHLORIDE 750 MG/1
10 TABLET, EXTENDED RELEASE ORAL DAILY
Qty: 30 TABLET | Refills: 3 | Status: SHIPPED | OUTPATIENT
Start: 2022-01-27

## 2022-03-02 RX ORDER — HYDROCHLOROTHIAZIDE 25 MG/1
TABLET ORAL
Qty: 90 TABLET | Refills: 1 | Status: SHIPPED | OUTPATIENT
Start: 2022-03-02 | End: 2022-03-03 | Stop reason: SDUPTHER

## 2022-03-03 DIAGNOSIS — I10 ESSENTIAL HYPERTENSION: ICD-10-CM

## 2022-03-03 NOTE — TELEPHONE ENCOUNTER
Please see patient refill request    Patient was last seen on 11-    Last prescribed  the Candesartan on 6-8-2021  #90 X 0    The Hydrochlorothyazide was just filled by you yesterday    Thank you

## 2022-03-03 NOTE — TELEPHONE ENCOUNTER
Requested Prescriptions     Pending Prescriptions Disp Refills    candesartan (ATACAND) 32 mg tablet 90 Tablet 0     Sig: TAKE 1 TABLET BY MOUTH EVERY DAY    hydroCHLOROthiazide (HYDRODIURIL) 25 mg tablet 90 Tablet 1

## 2022-03-07 RX ORDER — CANDESARTAN 32 MG/1
TABLET ORAL
Qty: 90 TABLET | Refills: 0 | Status: SHIPPED | OUTPATIENT
Start: 2022-03-07 | End: 2022-03-08 | Stop reason: SDUPTHER

## 2022-03-07 RX ORDER — HYDROCHLOROTHIAZIDE 25 MG/1
TABLET ORAL
Qty: 90 TABLET | Refills: 1 | Status: SHIPPED | OUTPATIENT
Start: 2022-03-07

## 2022-03-08 DIAGNOSIS — I10 ESSENTIAL HYPERTENSION: ICD-10-CM

## 2022-03-08 RX ORDER — CANDESARTAN 32 MG/1
TABLET ORAL
Qty: 90 TABLET | Refills: 0 | Status: SHIPPED | OUTPATIENT
Start: 2022-03-08 | End: 2022-05-25 | Stop reason: SDUPTHER

## 2022-03-19 PROBLEM — R33.8 BENIGN PROSTATIC HYPERPLASIA WITH URINARY RETENTION: Status: ACTIVE | Noted: 2017-10-11

## 2022-03-19 PROBLEM — D75.1 POLYCYTHEMIA: Status: ACTIVE | Noted: 2018-07-18

## 2022-03-19 PROBLEM — N40.1 BENIGN PROSTATIC HYPERPLASIA WITH URINARY RETENTION: Status: ACTIVE | Noted: 2017-10-11

## 2022-03-19 PROBLEM — D75.1 ERYTHROCYTOSIS: Status: ACTIVE | Noted: 2018-07-18

## 2022-05-25 ENCOUNTER — OFFICE VISIT (OUTPATIENT)
Dept: FAMILY MEDICINE CLINIC | Age: 67
End: 2022-05-25
Payer: MEDICARE

## 2022-05-25 VITALS
SYSTOLIC BLOOD PRESSURE: 125 MMHG | RESPIRATION RATE: 20 BRPM | BODY MASS INDEX: 31.54 KG/M2 | TEMPERATURE: 97.7 F | DIASTOLIC BLOOD PRESSURE: 77 MMHG | OXYGEN SATURATION: 97 % | HEART RATE: 84 BPM | HEIGHT: 73 IN | WEIGHT: 238 LBS

## 2022-05-25 DIAGNOSIS — N20.0 NEPHROLITHIASIS: Primary | ICD-10-CM

## 2022-05-25 DIAGNOSIS — N39.0 RECURRENT UTI: ICD-10-CM

## 2022-05-25 DIAGNOSIS — N40.1 BENIGN LOCALIZED PROSTATIC HYPERPLASIA WITH LOWER URINARY TRACT SYMPTOMS (LUTS): ICD-10-CM

## 2022-05-25 DIAGNOSIS — E78.5 DYSLIPIDEMIA: ICD-10-CM

## 2022-05-25 DIAGNOSIS — I10 ESSENTIAL HYPERTENSION: ICD-10-CM

## 2022-05-25 DIAGNOSIS — E66.9 OBESITY (BMI 30-39.9): ICD-10-CM

## 2022-05-25 PROCEDURE — G8417 CALC BMI ABV UP PARAM F/U: HCPCS | Performed by: FAMILY MEDICINE

## 2022-05-25 PROCEDURE — G8752 SYS BP LESS 140: HCPCS | Performed by: FAMILY MEDICINE

## 2022-05-25 PROCEDURE — 1123F ACP DISCUSS/DSCN MKR DOCD: CPT | Performed by: FAMILY MEDICINE

## 2022-05-25 PROCEDURE — G8427 DOCREV CUR MEDS BY ELIG CLIN: HCPCS | Performed by: FAMILY MEDICINE

## 2022-05-25 PROCEDURE — G8754 DIAS BP LESS 90: HCPCS | Performed by: FAMILY MEDICINE

## 2022-05-25 PROCEDURE — 3017F COLORECTAL CA SCREEN DOC REV: CPT | Performed by: FAMILY MEDICINE

## 2022-05-25 PROCEDURE — 99214 OFFICE O/P EST MOD 30 MIN: CPT | Performed by: FAMILY MEDICINE

## 2022-05-25 PROCEDURE — G8510 SCR DEP NEG, NO PLAN REQD: HCPCS | Performed by: FAMILY MEDICINE

## 2022-05-25 PROCEDURE — G8536 NO DOC ELDER MAL SCRN: HCPCS | Performed by: FAMILY MEDICINE

## 2022-05-25 PROCEDURE — 1101F PT FALLS ASSESS-DOCD LE1/YR: CPT | Performed by: FAMILY MEDICINE

## 2022-05-25 RX ORDER — CANDESARTAN 32 MG/1
TABLET ORAL
Qty: 90 TABLET | Refills: 1 | Status: SHIPPED | OUTPATIENT
Start: 2022-05-25

## 2022-05-25 NOTE — PROGRESS NOTES
HPI  Sampson Saucedo comes in for f/u care. Nephrolithiasis: Patient has recurrent nephrolithiasis. Has been followed up by the urologist.  He is due for stenting with JJ placement. He awaits scheduling for this procedure. Continues to have flank pain on and off. He will follow-up with the specialist.  Recurrent UTI: Patient has a history of recurrent UTIs. He is on antibiotics. Denies pyuria, dysuria or hematuria. LUTS/ neurogenic bladder: Patient has a history of lower urinary tract symptoms and a neurogenic bladder. He has had bladder lift procedure done in the past.  He has been doing self-catheterization. He currently is being followed up by the urologist.  He will continue with recommendations as per the specialist.  HTN: Patient has hypertension. Blood pressure is stable. He is on candesartan. Denies headache, changes in vision or focal weakness. We will continue with the current treatment plan. Dyslipidemia: Patient has dyslipidemia. He is doing exercise and taking a diet low in polysaturated fats. We had sent in a prescription of atorvastatin but he is not taking this medication. We will recheck labs. Obesity: Patient has obesity with a BMI of 31.40. He will intensify lifestyle and dietary modification.       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 Dr. Conchita Jc at [facility].   UROLOGICAL  10/28/2019    Cystoscopy and UroLift (transprostatic implants # 7). Dr. Deon Landers at [facility] 45 FLX DX W/COLLJ SPEC WHEN PFRMD          Medications  Current Outpatient Medications   Medication Sig Dispense Refill    candesartan (ATACAND) 32 mg tablet TAKE 1 TABLET BY MOUTH EVERY DAY 90 Tablet 0    hydroCHLOROthiazide (HYDRODIURIL) 25 mg tablet TAKE 1 TABLET BY MOUTH EVRY DAY 90 Tablet 1    potassium chloride (KLOR-CON M10) 10 mEq tablet TAKE 1 TABLET BY MOUTH DAILY 30 Tablet 3    amoxicillin (AMOXIL) 500 mg capsule Take 1 Capsule by mouth two (2) times a day. 14 Capsule 0    acetaminophen (TYLENOL) 500 mg tablet Take  by mouth every six (6) hours as needed for Pain.  cholecalciferol (VITAMIN D3) 1,000 unit cap Take  by mouth daily.  cyanocobalamin (VITAMIN B-12) 1,000 mcg tablet Take 1,000 mcg by mouth daily.  multivitamin (ONE A DAY) tablet Take 1 Tab by mouth daily.  oxybutynin (DITROPAN) 5 mg tablet Take 1 Tablet by mouth three (3) times daily. 42 Tablet 0    atorvastatin (LIPITOR) 20 mg tablet Take 1 Tablet by mouth daily.  (Patient not taking: Reported on 5/23/2022) 30 Tablet 2       Allergies  Allergies   Allergen Reactions    Penicillins Other (comments)     Pt says he isn't allergic to pcn    Scallops Nausea and Vomiting       Family History  Family History   Problem Relation Age of Onset    Cancer Mother     Hypertension Mother     Heart Disease Father     Diabetes Brother        Social History  Social History     Socioeconomic History    Marital status:      Spouse name: Not on file    Number of children: Not on file    Years of education: Not on file    Highest education level: Not on file   Occupational History    Not on file   Tobacco Use    Smoking status: Never Smoker    Smokeless tobacco: Never Used   Vaping Use    Vaping Use: Never used   Substance and Sexual Activity    Alcohol use: No     Comment: Occasionally    Drug use: No    Sexual activity: Yes   Other Topics Concern    Not on file   Social History Narrative    Not on file     Social Determinants of Health     Financial Resource Strain:     Difficulty of Paying Living Expenses: Not on file   Food Insecurity:     Worried About Running Out of Food in the Last Year: Not on file    Ca of Food in the Last Year: Not on file   Transportation Needs:     Lack of Transportation (Medical): Not on file    Lack of Transportation (Non-Medical): Not on file   Physical Activity:     Days of Exercise per Week: Not on file    Minutes of Exercise per Session: Not on file   Stress:     Feeling of Stress : Not on file   Social Connections:     Frequency of Communication with Friends and Family: Not on file    Frequency of Social Gatherings with Friends and Family: Not on file    Attends Yazidism Services: Not on file    Active Member of [address] [place] [address] or Organizations: Not on file    Attends Club or Organization Meetings: Not on file    Marital Status: Not on file   Intimate Partner Violence:     Fear of Current or Ex-Partner: Not on file    Emotionally Abused: Not on file    Physically Abused: Not on file    Sexually Abused: Not on file   Housing Stability:     Unable to Pay for Housing in the Last Year: Not on file    Number of [place] in the Last Year: Not on file    Unstable Housing in the Last Year: Not on file       Review of Systems  Review of Systems - Review of all systems is negative except as noted above in the HPI.     Vital Signs  Visit Vitals  /77 (BP 1 Location: Left upper arm, BP Patient Position: Sitting, BP Cuff Size: Large adult)   Pulse 84   Temp 97.7 °F (36.5 °C) (Temporal)   Resp 20   Ht 6' 1\" (1.854 m)   Wt 238 lb (108 kg)   SpO2 97%   BMI 31.40 kg/m² Physical Exam  Physical Examination: General appearance - alert, well appearing, and in no distress, oriented to person, place, and time and overweight  Mental status - alert, oriented to person, place, and time, normal mood, behavior, speech, dress, motor activity, and thought processes  Chest - clear to auscultation, no wheezes, rales or rhonchi, symmetric air entry  Heart - normal rate and regular rhythm, S1 and S2 normal  Abdomen - soft, nontender, nondistended, no masses or organomegaly  Back exam - limited range of motion  Neurological - motor and sensory grossly normal bilaterally  Musculoskeletal - osteoarthritic changes noted in both hands  Extremities - intact peripheral pulses      Results  Results for orders placed or performed in visit on 05/23/22   AMB POC URINALYSIS DIP STICK AUTO W/O MICRO   Result Value Ref Range    Color (UA POC) Yellow     Clarity (UA POC) Clear     Glucose (UA POC) Negative Negative    Bilirubin (UA POC) Negative Negative    Ketones (UA POC) Negative Negative    Specific gravity (UA POC) 1.015 1.001 - 1.035    Blood (UA POC) 2+ Negative    pH (UA POC) 6.5 4.6 - 8.0    Protein (UA POC) 1+ Negative    Urobilinogen (UA POC) 0.2 mg/dL 0.2 - 1    Nitrites (UA POC) Negative Negative    Leukocyte esterase (UA POC) 1+ Negative   AMB POC PVR, DIANA,POST-VOID RES,US,NON-IMAGING   Result Value Ref Range    PVR 5 cc       ASSESSMENT and PLAN    ICD-10-CM ICD-9-CM    1. Nephrolithiasis  N20.0 592.0    2. Recurrent UTI  N39.0 599.0    3. Essential hypertension  I10 401.9 candesartan (ATACAND) 32 mg tablet   4. Dyslipidemia  E78.5 272.4    5. Obesity (BMI 30-39. 9)  E66.9 278.00    6.  Benign localized prostatic hyperplasia with lower urinary tract symptoms (LUTS)  N40.1 600.21      599.69      lab results and schedule of future lab studies reviewed with patient  reviewed diet, exercise and weight control  cardiovascular risk and specific lipid/LDL goals reviewed  reviewed medications and side effects in detail      I have discussed the diagnosis with the patient and the intended plan of care as seen in the above orders. The patient has received an after-visit summary and questions were answered concerning future plans. I have discussed medication, side effects, and warnings with the patient in detail. The patient verbalized understanding and is in agreement with the plan of care. The patient will contact the office with any additional concerns. Yoni Christian MD    PLEASE NOTE:   This document has been produced using voice recognition software.  Unrecognized errors in transcription may be present

## 2022-05-25 NOTE — PROGRESS NOTES
Chief Complaint   Patient presents with    Follow Up Chronic Condition     1. \"Have you been to the ER, urgent care clinic since your last visit? Hospitalized since your last visit? \" No    2. \"Have you seen or consulted any other health care providers outside of the [address] [place] [address] since your last visit? \" No     3. For patients aged 39-70: Has the patient had a colonoscopy / FIT/ Cologuard? Yes - no Care Gap present      If the patient is female:    4. For patients aged 41-77: Has the patient had a mammogram within the past 2 years? NA - based on age or sex      11. For patients aged 21-65: Has the patient had a pap smear?  NA - based on age or sex

## 2022-11-17 ENCOUNTER — OFFICE VISIT (OUTPATIENT)
Dept: FAMILY MEDICINE CLINIC | Age: 67
End: 2022-11-17
Payer: MEDICARE

## 2022-11-17 ENCOUNTER — TELEPHONE (OUTPATIENT)
Dept: FAMILY MEDICINE CLINIC | Age: 67
End: 2022-11-17

## 2022-11-17 VITALS
OXYGEN SATURATION: 97 % | BODY MASS INDEX: 32.1 KG/M2 | HEIGHT: 72 IN | HEART RATE: 70 BPM | WEIGHT: 237 LBS | DIASTOLIC BLOOD PRESSURE: 89 MMHG | SYSTOLIC BLOOD PRESSURE: 137 MMHG | RESPIRATION RATE: 18 BRPM | TEMPERATURE: 97.8 F

## 2022-11-17 DIAGNOSIS — G47.30 SLEEP APNEA, UNSPECIFIED TYPE: ICD-10-CM

## 2022-11-17 DIAGNOSIS — U07.1 COVID-19: ICD-10-CM

## 2022-11-17 DIAGNOSIS — I10 ESSENTIAL HYPERTENSION: ICD-10-CM

## 2022-11-17 DIAGNOSIS — Z00.00 MEDICARE ANNUAL WELLNESS VISIT, SUBSEQUENT: ICD-10-CM

## 2022-11-17 DIAGNOSIS — N39.0 RECURRENT UTI: ICD-10-CM

## 2022-11-17 DIAGNOSIS — Z12.5 SCREENING FOR MALIGNANT NEOPLASM OF PROSTATE: ICD-10-CM

## 2022-11-17 DIAGNOSIS — Z13.31 SCREENING FOR DEPRESSION: ICD-10-CM

## 2022-11-17 DIAGNOSIS — N20.0 NEPHROLITHIASIS: Primary | ICD-10-CM

## 2022-11-17 DIAGNOSIS — E78.5 DYSLIPIDEMIA: ICD-10-CM

## 2022-11-17 DIAGNOSIS — M72.2 PLANTAR FASCIITIS: ICD-10-CM

## 2022-11-17 PROCEDURE — G8754 DIAS BP LESS 90: HCPCS | Performed by: FAMILY MEDICINE

## 2022-11-17 PROCEDURE — 1101F PT FALLS ASSESS-DOCD LE1/YR: CPT | Performed by: FAMILY MEDICINE

## 2022-11-17 PROCEDURE — 3017F COLORECTAL CA SCREEN DOC REV: CPT | Performed by: FAMILY MEDICINE

## 2022-11-17 PROCEDURE — 1123F ACP DISCUSS/DSCN MKR DOCD: CPT | Performed by: FAMILY MEDICINE

## 2022-11-17 PROCEDURE — G8427 DOCREV CUR MEDS BY ELIG CLIN: HCPCS | Performed by: FAMILY MEDICINE

## 2022-11-17 PROCEDURE — 99214 OFFICE O/P EST MOD 30 MIN: CPT | Performed by: FAMILY MEDICINE

## 2022-11-17 PROCEDURE — 3078F DIAST BP <80 MM HG: CPT | Performed by: FAMILY MEDICINE

## 2022-11-17 PROCEDURE — G0439 PPPS, SUBSEQ VISIT: HCPCS | Performed by: FAMILY MEDICINE

## 2022-11-17 PROCEDURE — G8510 SCR DEP NEG, NO PLAN REQD: HCPCS | Performed by: FAMILY MEDICINE

## 2022-11-17 PROCEDURE — G8752 SYS BP LESS 140: HCPCS | Performed by: FAMILY MEDICINE

## 2022-11-17 PROCEDURE — G0444 DEPRESSION SCREEN ANNUAL: HCPCS | Performed by: FAMILY MEDICINE

## 2022-11-17 PROCEDURE — G8536 NO DOC ELDER MAL SCRN: HCPCS | Performed by: FAMILY MEDICINE

## 2022-11-17 PROCEDURE — 3074F SYST BP LT 130 MM HG: CPT | Performed by: FAMILY MEDICINE

## 2022-11-17 PROCEDURE — G8417 CALC BMI ABV UP PARAM F/U: HCPCS | Performed by: FAMILY MEDICINE

## 2022-11-17 NOTE — TELEPHONE ENCOUNTER
Pt stated he would like his referral sent to Shelby Hagan at [facility]. Please advise.  Thank you!!!

## 2022-11-17 NOTE — PATIENT INSTRUCTIONS
Medicare Wellness Visit, Male    The best way to live healthy is to have a lifestyle where you eat a well-balanced diet, exercise regularly, limit alcohol use, and quit all forms of tobacco/nicotine, if applicable. Regular preventive services are another way to keep healthy. Preventive services (vaccines, screening tests, monitoring & exams) can help personalize your care plan, which helps you manage your own care. Screening tests can find health problems at the earliest stages, when they are easiest to treat. [facility] follows the current, evidence-based guidelines published by the [facility] E[facility] Ladarius ([facility]) when recommending preventive services for our patients. Because we follow these guidelines, sometimes recommendations change over time as research supports it. (For example, a prostate screening blood test is no longer routinely recommended for men with no symptoms). Of course, you and your doctor may decide to screen more often for some diseases, based on your risk and co-morbidities (chronic disease you are already diagnosed with). Preventive services for you include:  - Medicare offers their members a free annual wellness visit, which is time for you and your primary care provider to discuss and plan for your preventive service needs. Take advantage of this benefit every year!  -All adults over age 72 should receive the recommended pneumonia vaccines. Current USPSTF guidelines recommend a series of two vaccines for the best pneumonia protection.   -All adults should have a flu vaccine yearly and tetanus vaccine every 10 years.  -All adults age 48 and older should receive the shingles vaccines (series of two vaccines).        -All adults age 38-68 who are overweight should have a diabetes screening test once every three years.   -Other screening tests & preventive services for persons with diabetes include: an eye exam to screen for diabetic retinopathy, a kidney function test, a foot exam, and stricter control over your cholesterol.   -Cardiovascular screening for adults with routine risk involves an electrocardiogram (ECG) at intervals determined by the provider.   -Colorectal cancer screening should be done for adults age 54-65 with no increased risk factors for colorectal cancer. There are a number of acceptable methods of screening for this type of cancer. Each test has its own benefits and drawbacks. Discuss with your provider what is most appropriate for you during your annual wellness visit. The different tests include: colonoscopy (considered the best screening method), a fecal occult blood test, a fecal DNA test, and sigmoidoscopy.  -All adults born between [facility] should be screened once for Hepatitis C.  -An Abdominal Aortic Aneurysm (AAA) Screening is recommended for men age 73-68 who has ever smoked in their lifetime. Here is a list of your current Health Maintenance items (your personalized list of preventive services) with a due date:  Health Maintenance Due   Topic Date Due    COVID-19 Vaccine (1) Never done       Medicare Wellness Visit, Male    The best way to live healthy is to have a lifestyle where you eat a well-balanced diet, exercise regularly, limit alcohol use, and quit all forms of tobacco/nicotine, if applicable. Regular preventive services are another way to keep healthy. Preventive services (vaccines, screening tests, monitoring & exams) can help personalize your care plan, which helps you manage your own care. Screening tests can find health problems at the earliest stages, when they are easiest to treat. [facility] follows the current, evidence-based guidelines published by the [facility] ([facility]) when recommending preventive services for our patients.  Because we follow these guidelines, sometimes recommendations change over time as research supports it. (For example, a prostate screening blood test is no longer routinely recommended for men with no symptoms). Of course, you and your doctor may decide to screen more often for some diseases, based on your risk and co-morbidities (chronic disease you are already diagnosed with). Preventive services for you include:  - Medicare offers their members a free annual wellness visit, which is time for you and your primary care provider to discuss and plan for your preventive service needs. Take advantage of this benefit every year!  -All adults over age 72 should receive the recommended pneumonia vaccines. Current USPSTF guidelines recommend a series of two vaccines for the best pneumonia protection.   -All adults should have a flu vaccine yearly and tetanus vaccine every 10 years.  -All adults age 48 and older should receive the shingles vaccines (series of two vaccines). -All adults age 38-68 who are overweight should have a diabetes screening test once every three years.   -Other screening tests & preventive services for persons with diabetes include: an eye exam to screen for diabetic retinopathy, a kidney function test, a foot exam, and stricter control over your cholesterol.   -Cardiovascular screening for adults with routine risk involves an electrocardiogram (ECG) at intervals determined by the provider.   -Colorectal cancer screening should be done for adults age 54-65 with no increased risk factors for colorectal cancer. There are a number of acceptable methods of screening for this type of cancer. Each test has its own benefits and drawbacks. Discuss with your provider what is most appropriate for you during your annual wellness visit.  The different tests include: colonoscopy (considered the best screening method), a fecal occult blood test, a fecal DNA test, and sigmoidoscopy.  -All adults born between [facility] should be screened once for Hepatitis C.  -An Abdominal Aortic Aneurysm (AAA) Screening is recommended for men age 73-68 who has ever smoked in their lifetime.      Here is a list of your current Health Maintenance items (your personalized list of preventive services) with a due date:  Health Maintenance Due   Topic Date Due    COVID-19 Vaccine (1) Never done

## 2022-11-17 NOTE — PROGRESS NOTES
This is the Subsequent Medicare Annual Wellness Exam, performed 12 months or more after the Initial AWV or the last Subsequent AWV    I have reviewed the patient's medical history in detail and updated the computerized patient record. Assessment/Plan   Education and counseling provided:  Are appropriate based on today's review and evaluation    1. Medicare annual wellness visit, subsequent    2. Screening for malignant neoplasm of prostate  - PSA SCREENING (SCREENING); Future    3. Screening for depression  - [address] [facility] ANNUAL       Depression Risk Factor Screening     3 most recent PHQ Screens 11/17/2022   Little interest or pleasure in doing things Not at all   Feeling down, depressed, irritable, or hopeless Not at all   Total Score PHQ 2 0   Trouble falling or staying asleep, or sleeping too much Not at all   Feeling tired or having little energy Not at all   Poor appetite, weight loss, or overeating Not at all   Feeling bad about yourself - or that you are a failure or have let yourself or your family down Not at all   Trouble concentrating on things such as school, work, reading, or watching TV Not at all   Moving or speaking so slowly that other people could have noticed; or the opposite being so fidgety that others notice Not at all   Thoughts of being better off dead, or hurting yourself in some way Not at all   PHQ 9 Score 0   How difficult have these problems made it for you to do your work, take care of your home and get along with others Not difficult at all   8 minutes taken on depression screening.     Alcohol & Drug Abuse Risk Screen    Do you average more than 1 drink per night or more than 7 drinks a week: No    In the past three months have you have had more than 4 drinks containing alcohol on one occasion: No       Opioid Risk: (Low risk score <55, High risk score ?55)  Opioid risk score: 6      Click here to complete the Controlled Substance Monitoring SmartForm    Last PDMP Michelle Hu as Reviewed:  Review User Review Instant Review Result            Functional Ability and Level of Safety    Hearing: The patient needs further evaluation. Activities of Daily Living: The home contains: no safety equipment. Patient does total self care      Ambulation: with no difficulty     Fall Risk:  Fall Risk Assessment, last 12 mths 11/17/2022   Able to walk? Yes   Fall in past 12 months? 0   Do you feel unsteady?  0   Are you worried about falling 0      Abuse Screen:  Patient is not abused       Cognitive Screening    Has your family/caregiver stated any concerns about your memory: no     Cognitive Screening: Normal - Verbal Fluency Test    Health Maintenance Due     Health Maintenance Due   Topic Date Due    COVID-19 Vaccine (1) Never done    Shingrix Vaccine Age 50> (1 of 2) Never done    Pneumococcal 65+ years (1 - PCV) Never done    Flu Vaccine (1) Never done    Medicare Yearly Exam  11/18/2022       Patient Care Team   Patient Care Team:  Carol Simmonds, MD as PCP - General (Family Medicine)  Carol Simmonds, MD as PCP - [facility] Empaneled Provider  Pillo Saha MD (Urology)    History   Dioni Almazan comes in for Medicare wellness exam.    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     Past Medical History:   Diagnosis Date    Atonic bladder     Bladder stone     BPH with obstruction/lower urinary tract symptoms     Elevated PSA     Hematuria 4/9/2012    HLD (hyperlipidemia)     Hydronephrosis     Hypercholesteremia Hypertension     Kidney stones     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 Dr. Mariel Kruger at [facility]. HX UROLOGICAL  10/28/2019    Cystoscopy and UroLift (transprostatic implants # 7). Dr. Jacob Crowder at [id number]  06/15/2022    CYSTOSCOPY, RIGHT URETEROSCOPY, LASER LITHOTRIPSY , RIGHT STENT PLACEMENT AND CYSTOLITHOPAXY; Dr. Mendoza Glover COLONOSCOPY FLX DX W/COLLJ SPEC WHEN PFRMD       Current Outpatient Medications   Medication Sig Dispense Refill    cephALEXin (Keflex) 250 mg capsule Take 1 Capsule by mouth daily. 90 Capsule 1    nitrofurantoin, macrocrystal-monohydrate, (MACROBID) 100 mg capsule       candesartan (ATACAND) 32 mg tablet TAKE 1 TABLET BY MOUTH EVERY DAY 90 Tablet 1    hydroCHLOROthiazide (HYDRODIURIL) 25 mg tablet TAKE 1 TABLET BY MOUTH EVRY DAY 90 Tablet 1    potassium chloride (KLOR-CON M10) 10 mEq tablet TAKE 1 TABLET BY MOUTH DAILY 30 Tablet 3    acetaminophen (TYLENOL) 500 mg tablet Take  by mouth every six (6) hours as needed for Pain. cholecalciferol (VITAMIN D3) 25 mcg (1,000 unit) cap Take  by mouth daily. cyanocobalamin 1,000 mcg tablet Take 1,000 mcg by mouth daily. multivitamin (ONE A DAY) tablet Take 1 Tab by mouth daily. cefpodoxime (VANTIN) 200 mg tablet Take 1 Tablet by mouth two (2) times a day.  (Patient not taking: Reported on 11/17/2022) 14 Tablet 0    cephALEXin (KEFLEX) 500 mg capsule TAKE 1 CAPSULE BY MOUTH TWICE DAILY FOR 5 DAYS (Patient not taking: Reported on 11/17/2022)      oxyCODONE-acetaminophen (PERCOCET) 5-325 mg per tablet TAKE 1 TABLET BY MOUTH EVERY 4 HOURS AS NEEDED FOR PAIN (Patient not taking: Reported on 11/17/2022)       Allergies   Allergen Reactions    Scallops Nausea and Vomiting       Family History   Problem Relation Age of Onset    Cancer Mother     Hypertension Mother     Heart Disease Father     Diabetes Brother      Social History     Tobacco Use    Smoking status: Never    Smokeless tobacco: Never   Substance Use Topics    Alcohol use: No     Comment: Occasionally     I have discussed the diagnosis with the patient and the intended plan of care as seen in the above orders. The patient has received an after-visit summary and questions were answered concerning future plans. I have discussed medication, side effects, and warnings with the patient in detail. The patient verbalized understanding and is in agreement with the plan of care. The patient will contact the office with any additional concerns. Personalized preventative plan of care was discussed, printed and given to patient.     Jennifer Lewis MD

## 2022-11-17 NOTE — PROGRESS NOTES
[facility]  Maggy Caldwell comes in for follow up care. Nephrolithiasis: Patient has nephrolithiasis. He is seen by urologist.  He has multiple urinary stones being extruded at the moment. He recently had radiological studies done and is due for follow-up with Dr. Dorothy Severe. Denies flank pain or hematuria. He has had multiple UTIs as a result of the nephrolithiasis and is currently on antibiotics. Denies fever, chills, nausea or vomiting. We will continue with current treatment plan. Neurogenic bladder: Patient has a neurogenic bladder. He is followed up by the urologist.  He does self-catheterization. Hypertension: Patient has hypertension. Blood pressure is stable. He is on Atacand and HCTZ. Denies headache, changes in vision or focal weakness. We will continue with the current treatment plan. He will take a low-sodium diet. Obesity: Patient has a BMI of 32.14. He will intensify lifestyle and dietary modification. Sleep apnea: Patient has sleep apnea. He uses a CPAP machine but this does not seem to be working well. He gets bloated and needs aerophagia. He would like to be referred to a different sleep clinic for evaluation and possible readjustment on his CPAP machine pressures. Referral will be placed. Planter fasciitis: Patient has plantar fasciitis bilateral lower extremities. He has had orthotics made in the past.  Would like referral to specialist so that this can be done again. His previous orthotics are worn out. Referral is placed to podiatrist.  He will let us know which podiatrist he would like to go and see. Health maintenance: Patient will have PSA test done to screen for prostate cancer. Patient declines flu vaccine or pneumonia vaccine or tetanus vaccine. He states that he does not take vaccines.       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 Dr. Sammi Khalil at [facility]. HX UROLOGICAL  10/28/2019    Cystoscopy and UroLift (transprostatic implants # 7). Dr. Trinity Covarrubias at [facility]  06/15/2022    CYSTOSCOPY, RIGHT URETEROSCOPY, LASER LITHOTRIPSY , RIGHT STENT PLACEMENT AND CYSTOLITHOPAXY; Dr. Sara Watts COLONOSCOPY FLX DX W/COLLJ SPEC WHEN PFRMD          Medications  Current Outpatient Medications   Medication Sig Dispense Refill    cephALEXin (Keflex) 250 mg capsule Take 1 Capsule by mouth daily. 90 Capsule 1    nitrofurantoin, macrocrystal-monohydrate, (MACROBID) 100 mg capsule       candesartan (ATACAND) 32 mg tablet TAKE 1 TABLET BY MOUTH EVERY DAY 90 Tablet 1    hydroCHLOROthiazide (HYDRODIURIL) 25 mg tablet TAKE 1 TABLET BY MOUTH EVRY DAY 90 Tablet 1    potassium chloride (KLOR-CON M10) 10 mEq tablet TAKE 1 TABLET BY MOUTH DAILY 30 Tablet 3    acetaminophen (TYLENOL) 500 mg tablet Take  by mouth every six (6) hours as needed for Pain. cholecalciferol (VITAMIN D3) 25 mcg (1,000 unit) cap Take  by mouth daily. cyanocobalamin 1,000 mcg tablet Take 1,000 mcg by mouth daily. multivitamin (ONE A DAY) tablet Take 1 Tab by mouth daily. cefpodoxime (VANTIN) 200 mg tablet Take 1 Tablet by mouth two (2) times a day.  (Patient not taking: Reported on 11/17/2022) 14 Tablet 0    cephALEXin (KEFLEX) 500 mg capsule TAKE 1 CAPSULE BY MOUTH TWICE DAILY FOR 5 DAYS (Patient not taking: Reported on 11/17/2022)      oxyCODONE-acetaminophen (PERCOCET) 5-325 mg per tablet TAKE 1 TABLET BY MOUTH EVERY 4 HOURS AS NEEDED FOR PAIN (Patient not taking: Reported on 11/17/2022)         Allergies  Allergies   Allergen Reactions    Scallops Nausea and Vomiting       Family History  Family History   Problem Relation Age of Onset    Cancer Mother     Hypertension Mother     Heart Disease Father     Diabetes Brother        Social History  Social History     Socioeconomic History    Marital status:      Spouse name: Not on file    Number of children: Not on file    Years of education: Not on file    Highest education level: Not on file   Occupational History    Not on file   Tobacco Use    Smoking status: Never    Smokeless tobacco: Never   Vaping Use    Vaping Use: Never used   Substance and Sexual Activity    Alcohol use: No     Comment: Occasionally    Drug use: No    Sexual activity: Yes   Other Topics Concern    Not on file   Social History Narrative    Not on file     Social Determinants of Health     Financial Resource Strain: Not on file   Food Insecurity: Not on file   Transportation Needs: Not on file   Physical Activity: Not on file   Stress: Not on file   Social Connections: Not on file   Intimate Partner Violence: Not on file   Housing Stability: Not on file       Review of Systems  Review of Systems - Review of all systems is negative except as noted above in the HPI.     Vital Signs  Visit Vitals  /89 (BP 1 Location: Left upper arm, BP Patient Position: Sitting, BP Cuff Size: Adult long)   Pulse 70   Temp 97.8 °F (36.6 °C)   Resp 18   Ht 6' (1.829 m)   Wt 237 lb (107.5 kg)   SpO2 97%   BMI 32.14 kg/m²         Physical Exam  Physical Examination: General appearance - alert, well appearing, and in no distress, oriented to person, place, and time, overweight, and acyanotic, in no respiratory distress  Mental status - affect appropriate to mood  Neck - supple, no significant adenopathy  Lymphatics - no palpable lymphadenopathy  Chest - no tachypnea, retractions or cyanosis  Heart - S1 and S2 normal  Abdomen - no rebound tenderness noted  Back exam - limited range of motion  Neurological - abnormal neurological exam unchanged from prior examinations  Musculoskeletal - osteoarthritic changes noted in both hands  Extremities - no pedal edema noted, intact peripheral pulses        Results  Results for orders placed or performed during the hospital encounter of 06/15/22   STONE ANALYSIS W/O IMAGE   Result Value Ref Range    Component 1 SEE NOTE      Component 2 N/A      Stone weight 0.193 g   STONE ANALYSIS W/O IMAGE   Result Value Ref Range    Component 1 SEE NOTE      Component 2 N/A      Stone weight 0.053 g       ASSESSMENT and PLAN    ICD-10-CM ICD-9-CM    1. Nephrolithiasis  N20.0 592.0       2. Recurrent UTI  N39.0 599.0       3. Essential hypertension  S17 241.4 METABOLIC PANEL, COMPREHENSIVE      CBC WITH AUTOMATED DIFF      4. Dyslipidemia  E78.5 272.4 LIPID PANEL      5. COVID-19  U07.1 079.89 SARS-COV-2 AB, TOTAL      6. Plantar fasciitis  M72.2 728.71 REFERRAL TO PODIATRY      7. Sleep apnea, unspecified type  G47.30 780.57 SLEEP MEDICINE REFERRAL      8. Medicare annual wellness visit, subsequent  Z00.00 V70.0       9. Screening for malignant neoplasm of prostate  Z12.5 V76.44 PSA SCREENING (SCREENING)      10.  Screening for depression  Z13.31 V79.0 [address]        lab results and schedule of future lab studies reviewed with patient  reviewed diet, exercise and weight control  cardiovascular risk and specific lipid/LDL goals reviewed  reviewed medications and side effects in detail  radiology results and schedule of future radiology studies reviewed with patient      I have discussed the diagnosis with the patient and the intended plan of care as seen in the above orders. The patient has received an after-visit summary and questions were answered concerning future plans. I have discussed medication, side effects, and warnings with the patient in detail. The patient verbalized understanding and is in agreement with the plan of care. The patient will contact the office with any additional concerns. Ha Arevalo MD    PLEASE NOTE:   This document has been produced using voice recognition software.  Unrecognized errors in transcription may be present

## 2022-11-23 ENCOUNTER — OFFICE VISIT (OUTPATIENT)
Dept: PULMONOLOGY | Age: 67
End: 2022-11-23
Payer: MEDICARE

## 2022-11-23 VITALS
TEMPERATURE: 97.7 F | OXYGEN SATURATION: 96 % | WEIGHT: 237 LBS | HEIGHT: 72 IN | BODY MASS INDEX: 32.1 KG/M2 | RESPIRATION RATE: 18 BRPM | DIASTOLIC BLOOD PRESSURE: 93 MMHG | HEART RATE: 86 BPM | SYSTOLIC BLOOD PRESSURE: 164 MMHG

## 2022-11-23 DIAGNOSIS — G47.33 OBSTRUCTIVE SLEEP APNEA OF ADULT: Primary | ICD-10-CM

## 2022-11-23 DIAGNOSIS — R35.1 NOCTURIA: ICD-10-CM

## 2022-11-23 DIAGNOSIS — I10 PRIMARY HYPERTENSION: ICD-10-CM

## 2022-11-23 DIAGNOSIS — F45.8 AEROPHAGIA: ICD-10-CM

## 2022-11-23 DIAGNOSIS — D75.1 POLYCYTHEMIA: ICD-10-CM

## 2022-11-23 PROCEDURE — 1123F ACP DISCUSS/DSCN MKR DOCD: CPT | Performed by: OTOLARYNGOLOGY

## 2022-11-23 PROCEDURE — 3074F SYST BP LT 130 MM HG: CPT | Performed by: OTOLARYNGOLOGY

## 2022-11-23 PROCEDURE — 99203 OFFICE O/P NEW LOW 30 MIN: CPT | Performed by: OTOLARYNGOLOGY

## 2022-11-23 PROCEDURE — 3078F DIAST BP <80 MM HG: CPT | Performed by: OTOLARYNGOLOGY

## 2022-11-23 NOTE — PATIENT INSTRUCTIONS
Please call our clinic back at [phone] or send a message on [facility] if you have any questions or concerns or if you are experiencing any of the following: You have not received a follow up appointment within 30 days prior the recommended follow up time. If you are not tolerating treatment plan and/or not able to obtain equipment or prescribed medication(s). if you are experiencing any difficulties with the [employer]  (DME) Company you may be using or is assigned to you. Two weeks have passed and you have not received an appointment for a scheduled procedure. Two weeks have passed since you underwent a test and/or procedure and you have not received your results. If you are using a CPAP/BIPAP, or Home Ventilator Device- Please note the following. Currently, many DMEs are experiencing supply chain difficulties and orders for equipment may be back logged several weeks to months. Your  Durable Medical Equipment (DME ) company is supposed to provide you with replacement filters, tubing and masks. You can either call your DME when you need new supplies or you can arrange for an automatic shipment schedule. Your need to be seen by our office at lat minimum of every 12 months in order to renew the prescription for these supplies. Please make note of who your DME company is and their phone number. Please make sure that you clean your mask and hosing on a regular basis. Your DME can provide you with additional information regarding proper care and cleaning of your device       Safety is strongly encouraged. You should not drive if sleepy, tired, distracted and/or  fatigued. If a procedure or imaging study has been ordered for your by this clinic please call the [facility] at [facility] or Lizzy at  [address] [place] [address] [place] and blood work do not require appointments.   They are considered \"Walk-In\" services and can be obtained at either [facility] or [facility]     Blood work is performed at the Laboratory (Lab) from 08:00am - 04:00pm      -Thank you

## 2022-11-23 NOTE — PROGRESS NOTES
Tyler Lea presents today for   Chief Complaint   Patient presents with    Sleep Problem    Snoring       Is someone accompanying this pt? no    Is the patient using any DME equipment during OV? Yes    -[employer]    Have you ever had a sleep study done before? Yes; Name: THOMAS    Depression Screening:  3 most recent PHQ Screens 11/23/2022   Little interest or pleasure in doing things Not at all   Feeling down, depressed, irritable, or hopeless Not at all   Total Score PHQ 2 0   Trouble falling or staying asleep, or sleeping too much -   Feeling tired or having little energy -   Poor appetite, weight loss, or overeating -   Feeling bad about yourself - or that you are a failure or have let yourself or your family down -   Trouble concentrating on things such as school, work, reading, or watching TV -   Moving or speaking so slowly that other people could have noticed; or the opposite being so fidgety that others notice -   Thoughts of being better off dead, or hurting yourself in some way -   PHQ 9 Score -   How difficult have these problems made it for you to do your work, take care of your home and get along with others -       [place] Sleepiness Scale:  [place] Sleepiness Scale  11/23/2022   Sitting and Reading 1   Watching TV 2   Sitting, inactive in a public place (e.g. a movie theater or meeting) 0   As a passenger in a car for an hour, without a break 0   Lying down to rest in the afternoon, when circumstances permit 2   Sitting and talking to someone 0   Sitting quietly after lunch without alcohol 0   In a car, while stopped for a few minutes in traffic 0   [place] Sleepiness Score 5       Stop-Bang:  Stop Yeyo Stone 11/23/2022   Does the patient snore loudly (louder than talking or loud enough to be heard through closed doors)? 1   Does the patient often feel tired, fatigued, or sleepy during the daytime, even after a \"good\" night's sleep?  1   Has anyone ever observed the patient stop breathing during their sleep? 1   Does the patient have or are they being treated for high blood pressure? 1   Is the patient's BMI greater than 35? 0   Is your neck circumference greater than 17 inches (Male) or 16 inches (Female)? 0   Is the patient older than 48? 1   Is the patient male? 1   NIKKI Score 6   Has the patient been referred to Sleep Medicine? -   Has the patient previously been diagnosed with Obstructive Sleep Apnea? -   Treated or Untreated? -       Neck Circumference: 17 inches. Coordination of Care:  1. Have you been to the ER, urgent care clinic since your last visit? Hospitalized since your last visit? No    2. Have you seen or consulted any other health care providers outside of the [address] [place] [address] since your last visit? Include any pap smears or colon screening. Medication list has been updated according to patient.

## 2022-11-23 NOTE — PROGRESS NOTES
[facility]  Pulmonary, Critical Care, and Sleep Medicine    Office Progress Note - Initial Evaluation      Primary Care Physician: Dillon Mccarty MD     Reason for Visit: Ongoing care for obstructive sleep apnea    Assessment:  1. Obstructive sleep apnea of adult  -     AMB SUPPLY ORDER  2. Aerophagia  3. Nocturia  4. Primary hypertension  5. Polycythemia - history of    Discussion: Mr. Judene Leventhal is a 59-year-old male with a history of obstructive sleep apnea and has been on BiPAP for several years. At this time I do not have his sleep studies to review and can only see his current compliance data for his BiPAP for the last month. He is very compliant with use and is apnea is well controlled. His main complaint today is aerophagia which happens at least once per night and has been worse in the past.  I will need to review his previous settings on his CPAP once we are able to get his [employer] account transferred over to our practice. I did tell him I would try to adjust his settings remotely and let him know when I would be able to do this so he can tell me if it has improved the situation. I also will order him supplies and will see him once a year and follow-up. He did not have any new questions today other than perhaps getting a new machine next year. Plan:  Order on-going DME supplies, will try to adjust his Bipap settings to help with his aerophagia once we can get his [employer] account in our practice. Potential consequences of untreated sleep apnea, and/or excessive daytime sleepiness were discussed with the patient. Educational materials provided. Treatment options including CPAP, dental appliance, weight reduction measures, positional therapy, surgeries etc were at least briefly discussed. Healthy lifestyle changes to include weight loss and exercise discussed. Healthy sleep habits were reviewed and encouraged.    and workplace safety reviewed and discussed as appropriate. Drowsy and/or inattentive driving should be avoided. Follow up with Primary Care Provider (PCP) as directed and for routine health care maintenance. Follow-up: 1-2 months (after sleep study complete), sooner should new symptoms or problems arise. Thank you for allowing me to participate in the care of your patient! Monica Bran DO, FACS  Sleep and Medicine        History of Present Illness: Mr. Theresa Abebe is a 79 y.o. male patient who was referred for on-going [facility]AS care/therapy. The history was provided by the patient. Patient has a longstanding history of obstructive sleep apnea and has been on BiPAP for \"many years\". He does generally pretty well with his nasal mask and his machine but does complain of aerophagia which happens once nightly at least.  He does describe it being worse prior and his machine has been adjusted several times and he is here today to establish care with a new sleep provider as a new patient. He has no new sleep complaints at this time. Occupation: Retired               Work Schedule: N/A    Driving:  yes  Drowsy Driving: is not reported. Motor vehicle accident(s) associated with drowsy driving have not occurred. Snoring:is a problem without his CPAP. This is a Chronic problem which has been ongoing for years. Witnessed apneas are not reported. Daytime Somnolence: is not a problem (see [place] score below). Dental: Teeth clenching or grinding is not reported. Naps: are reported. He takes approximately 1 naps daily. Naps from 15-20 minutes and does find nap(s) refreshing. Vivid dreams do notoccur with naps. Leg Symptoms: He does not have unpleasant or crawling sensation in legs or strong urge to move when inactive. This typically starts at  10 PM if it is present. Pain: Pain typically does not disturb their sleep on most days. GERD: is not reported during the night.     Mood: The patient describes their mood as: stable. Sleep-Wake History:     Mello Trevino usually gets into bed at approximately 8 to 10:30 PM and falls asleep within \"1 minute\". He awakens at least 2 times per night for nocturia and once with aerophagia and gets up for the day between 5 and 6:00 without using an alarm and is somewhat tired but generally \"okay\". Reports sleeping in a Bed with 1-2 pillows under their head. Estimates sleeping approximately 7 hours per night/day. Usually awakens spontaneously and easily      Workdays and weekends are not different in terms of sleep/wake times. Snoring is reported and is reported without using his CPAP. Witnessed apneas are notreported. Patient does not have a bed partner currently. Vivid dreams are not    Waking up from sleep with a headache is not reported. Awakening with a dry mouth is not reported. Symptom(s) suggestive of cataplexy are not reported. Sleep paralysis is not reported. Hallucinations: are not reported. Sleep walking is not reported. Sleep talking is not reported. Restless legs are not reported. Enuresis is not reported. Acting out dreams violently is not reported. \"Clock-watching\" at night is reported. Other unusual and/or parasomnia behaviors is not reported. Family Sleep History (if available/pertinent):    Stop Bang 11/23/2022 10/28/2019 10/14/2019   Does the patient snore loudly (louder than talking or loud enough to be heard through closed doors)? 1 1 1   Does the patient often feel tired, fatigued, or sleepy during the daytime, even after a \"good\" night's sleep? 1 0 0   Has anyone ever observed the patient stop breathing during their sleep? 1 1 1   Does the patient have or are they being treated for high blood pressure? 1 1 1   Is the patient's BMI greater than 35? 0 0 0   Is your neck circumference greater than 17 inches (Male) or 16 inches (Female)? 0 0 0   Is the patient older than 48? 1 1 1   Is the patient male?  1 1 1 NIKKI Score 6 5 5   Has the patient been referred to Sleep Medicine? - 1 1   Has the patient previously been diagnosed with Obstructive Sleep Apnea? - 1 1   Treated or Untreated? - Treated Treated       3 most recent PHQ Screens 11/23/2022   Little interest or pleasure in doing things Not at all   Feeling down, depressed, irritable, or hopeless Not at all   Total Score PHQ 2 0   Trouble falling or staying asleep, or sleeping too much -   Feeling tired or having little energy -   Poor appetite, weight loss, or overeating -   Feeling bad about yourself - or that you are a failure or have let yourself or your family down -   Trouble concentrating on things such as school, work, reading, or watching TV -   Moving or speaking so slowly that other people could have noticed; or the opposite being so fidgety that others notice -   Thoughts of being better off dead, or hurting yourself in some way -   PHQ 9 Score -   How difficult have these problems made it for you to do your work, take care of your home and get along with others -        [place] Sleepiness Score: 5   which reflects no current daytime drowsiness. Immunization History: There is no immunization history on file for this patient.     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 Dr. Mariel Kruger at [facility]. HX UROLOGICAL  10/28/2019    Cystoscopy and UroLift (transprostatic implants # 7). Dr. Jacob Crowder at [id number]  06/15/2022    CYSTOSCOPY, RIGHT URETEROSCOPY, LASER LITHOTRIPSY , RIGHT STENT PLACEMENT AND CYSTOLITHOPAXY; Dr. Mendoza [place] COLONOSCOPY FLX DX W/COLLJ SPEC WHEN PFRMD         Family History:  Family History   Problem Relation Age of Onset    Cancer Mother     Hypertension Mother     Heart Disease Father     Diabetes Brother        Social History:  Social History     Tobacco Use    Smoking status: Never    Smokeless tobacco: Never   Vaping Use    Vaping Use: Never used   Substance Use Topics    Alcohol use: No     Comment: Occasionally    Drug use: No        Caffeine Amount Time of last Intake Comments   Coffee 2 cups coffee q am     Soda 0     Tea 0     Energy Drinks 0     Over- the - counter stimulant pills 0     Other Substances      Alcohol 0     Tobacco 0     Drugs 0     Other: 0         Medications:  Current Outpatient Medications on File Prior to Visit   Medication Sig Dispense Refill    candesartan (ATACAND) 32 mg tablet TAKE 1 TABLET BY MOUTH EVERY DAY 90 Tablet 1    hydroCHLOROthiazide (HYDRODIURIL) 25 mg tablet TAKE 1 TABLET BY MOUTH EVRY DAY 90 Tablet 1    potassium chloride (KLOR-CON M10) 10 mEq tablet TAKE 1 TABLET BY MOUTH DAILY 30 Tablet 3    acetaminophen (TYLENOL) 500 mg tablet Take  by mouth every six (6) hours as needed for Pain. cholecalciferol (VITAMIN D3) 25 mcg (1,000 unit) cap Take  by mouth daily. cyanocobalamin 1,000 mcg tablet Take 1,000 mcg by mouth daily. multivitamin (ONE A DAY) tablet Take 1 Tab by mouth daily. No current facility-administered medications on file prior to visit.         Allergy:  Allergies   Allergen Reactions    Scallops Nausea and Vomiting       Review of Systems:  General: Negative for fatigue, malaise, chills, fever, hot flashes and night sweats. Eyes: No dry eyes, eye irritation, eye disease/injury. ENT: Negative for headaches, epistaxis, nasal congestion, nasal discharge, nasal polys, oral lesions sinus pain/infections, tinnitus, vertigo, hearing loss. Hematological and Lymphatic: negative for bleeding problems, blood clots, bruising, jaundice, swollen lymph nodes, anemia  Endocrine: negative for polydipsia/polyuria, skin changes, temperature intolerance or unexpected weight changes  Respiratory: Negative for cough, sneezing, shortness of breath or wheezing  Cardiovascular: Negative for chest pain, dyspnea on exertion, no palpitations,no known heart murmur  Gastrointestinal: No abdominal pain, no recent change in bowel habits  Genito-Urinary: No dysuria, trouble voiding, or hematuria  Musculoskeletal: Negative for joint pain, limb weakness, no arthralgias, no swelling in extremities, no neck pain, no osteoporosis  Neurological: No TIA or stroke symptoms, no weakness, no numbness, no seizures, no dizziness, no tremors, no gait dysfunction, no paralysis, no headaches  Dermatological: Negative for - pruritus, rash or skin lesion changes   Psychological: Negative for mood lability  Otherwise negative and per HPI (see HPI summary)      Physical Exam:  Blood pressure (!) 164/93, pulse 86, temperature 97.7 °F (36.5 °C), temperature source Temporal, resp. rate 18, height 6' (1.829 m), weight 107.5 kg (237 lb), SpO2 96 %. on room air, Body mass index is 32.14 kg/m². General: No distress, acyanotic, appears stated age, cooperative, pleasant  HEENT: PERRL, EOMI, OC/OP: Tongue without lesions, Mallampati score 2+, Uvula- midline,Tonsils- 1, FTP III, class I occlusion  Neck: Supple, no lymphadenopathy, thyroid is not enlarged/no nodules noted   Lungs: Clear to auscultation bilaterally  Heart: Regular rate and rhythm, S1/S2 present, without murmur.   Abdomen: Abdomen is soft without significant tenderness, or guarding. Extremity: Negative for cyanosis, edema, or clubbing. Skin: Skin color, texture, turgor normal. No rashes or lesions.   Neurological: CN 2-12 grossly intact, normal muscle tone, no focal deficits    Data Reviewed:  CBC:   Lab Results   Component Value Date/Time    WBC 10.4 12/02/2021 08:03 AM    HGB 17.6 (H) 12/02/2021 08:03 AM    HCT 54.0 (H) 12/02/2021 08:03 AM    PLATELET 435 53/41/3901 08:03 AM    MCV 88.7 12/02/2021 08:03 AM       BMP:   Lab Results   Component Value Date/Time    Sodium 138 12/02/2021 08:03 AM    Potassium 3.7 12/02/2021 08:03 AM    Chloride 101 12/02/2021 08:03 AM    CO2 32 12/02/2021 08:03 AM    Anion gap 5 12/02/2021 08:03 AM    Glucose 129 (H) 12/02/2021 08:03 AM    BUN 16 12/02/2021 08:03 AM    Creatinine 1.19 12/02/2021 08:03 AM    BUN/Creatinine ratio 13 12/02/2021 08:03 AM    GFR est AA >60 12/02/2021 08:03 AM    GFR est non-AA >60 12/02/2021 08:03 AM    Calcium 9.8 12/02/2021 08:03 AM        TSH:  Lab Results   Component Value Date/Time    TSH 1.48 03/29/2010 09:20 AM      Positive Airway Pressure (PAP) Compliance  Report & Summary Trends  DME:     Date >4 hr use % Time  (Total Time%) AHI PAP Rx Pressure @ 95% Median Use Time Leak-Median  (95%) Notes   8/25/22-11/22/22 86% 2.8 8/5, easy breathe 10.5 5 hrs, 45 min 2.5-10.6 Aircurve 10 Auto                                      Historical Sleep Testing Data: Not available    Mann Amaya DO, FACS  Sleep and Medicine

## 2022-12-08 DIAGNOSIS — I10 ESSENTIAL HYPERTENSION: ICD-10-CM

## 2022-12-08 NOTE — TELEPHONE ENCOUNTER
Please see refill requests    Patient was last seen on 11-    Last prescribed Candesartan on 5-  #90 X 1    Last prescribed the Hydrochlorothiazide on 5-7-2022  #90 X 1    Thank you

## 2022-12-08 NOTE — TELEPHONE ENCOUNTER
Requested Prescriptions     Pending Prescriptions Disp Refills    hydroCHLOROthiazide (HYDRODIURIL) 25 mg tablet [Pharmacy Med Name: HYDROCHLOROTHIAZIDE 25MG TABLETS] 90 Tablet 1     Sig: TAKE 1 TABLET BY MOUTH EVERY DAY    candesartan (ATACAND) 32 mg tablet [Pharmacy Med Name: CANDESARTAN 32MG TABLETS] 90 Tablet 1     Sig: TAKE 1 TABLET BY MOUTH EVERY DAY

## 2022-12-13 RX ORDER — HYDROCHLOROTHIAZIDE 25 MG/1
TABLET ORAL
Qty: 90 TABLET | Refills: 1 | Status: SHIPPED | OUTPATIENT
Start: 2022-12-13

## 2022-12-13 RX ORDER — CANDESARTAN 32 MG/1
TABLET ORAL
Qty: 90 TABLET | Refills: 1 | Status: SHIPPED | OUTPATIENT
Start: 2022-12-13

## 2023-01-04 LAB
A-G RATIO,AGRAT: 1.5 RATIO (ref 1.1–2.6)
ABSOLUTE LYMPHOCYTE COUNT, 10803: 3.2 K/UL (ref 1–4.8)
ALBUMIN SERPL-MCNC: 4.4 G/DL (ref 3.5–5)
ALP SERPL-CCNC: 74 U/L (ref 40–125)
ALT SERPL-CCNC: 21 U/L (ref 5–40)
ANION GAP SERPL CALC-SCNC: 13 MMOL/L (ref 3–15)
AST SERPL W P-5'-P-CCNC: 16 U/L (ref 10–37)
BASOPHILS # BLD: 0.1 K/UL (ref 0–0.2)
BASOPHILS NFR BLD: 1 % (ref 0–2)
BILIRUB SERPL-MCNC: 0.5 MG/DL (ref 0.2–1.2)
BUN SERPL-MCNC: 17 MG/DL (ref 6–22)
CALCIUM SERPL-MCNC: 9.6 MG/DL (ref 8.4–10.5)
CHLORIDE SERPL-SCNC: 97 MMOL/L (ref 98–110)
CO2 SERPL-SCNC: 29 MMOL/L (ref 20–32)
CREAT SERPL-MCNC: 1.1 MG/DL (ref 0.8–1.6)
EOSINOPHIL # BLD: 0.2 K/UL (ref 0–0.5)
EOSINOPHIL NFR BLD: 2 % (ref 0–6)
ERYTHROCYTE [DISTWIDTH] IN BLOOD BY AUTOMATED COUNT: 13.4 % (ref 10–15.5)
GLOBULIN,GLOB: 3 G/DL (ref 2–4)
GLOMERULAR FILTRATION RATE: >60 ML/MIN/1.73 SQ.M.
GLUCOSE SERPL-MCNC: 109 MG/DL (ref 70–99)
GRANULOCYTES,GRANS: 63 % (ref 40–75)
HCT VFR BLD AUTO: 50.8 % (ref 37.8–52.2)
HGB BLD-MCNC: 17.5 G/DL (ref 12.6–17.1)
LYMPHOCYTES, LYMLT: 27 % (ref 20–45)
MCH RBC QN AUTO: 30 PG (ref 26–34)
MCHC RBC AUTO-ENTMCNC: 34 G/DL (ref 31–36)
MCV RBC AUTO: 86 FL (ref 80–95)
MONOCYTES # BLD: 1 K/UL (ref 0.1–1)
MONOCYTES NFR BLD: 8 % (ref 3–12)
NEUTROPHILS # BLD AUTO: 7.6 K/UL (ref 1.8–7.7)
PLATELET # BLD AUTO: 256 K/UL (ref 140–440)
PMV BLD AUTO: 10.5 FL (ref 9–13)
POTASSIUM SERPL-SCNC: 3.5 MMOL/L (ref 3.5–5.5)
PROT SERPL-MCNC: 7.4 G/DL (ref 6.2–8.1)
RBC # BLD AUTO: 5.93 M/UL (ref 3.8–5.8)
SODIUM SERPL-SCNC: 139 MMOL/L (ref 133–145)
WBC # BLD AUTO: 12 K/UL (ref 4–11)

## 2023-01-05 LAB
CHOLEST SERPL-MCNC: 207 MG/DL (ref 110–200)
HDLC SERPL-MCNC: 27 MG/DL
HDLC SERPL-MCNC: 7.7 MG/DL (ref 0–5)
LDL/HDL RATIO,LDHD: 4.7
LDLC SERPL CALC-MCNC: 128 MG/DL (ref 50–99)
NON-HDL CHOLESTEROL, 011976: 180 MG/DL
PSA SERPL-MCNC: 5.2 NG/ML
SARS-COV-2 AB: NON REACTIVE
TRIGL SERPL-MCNC: 260 MG/DL (ref 40–149)
VLDLC SERPL CALC-MCNC: 52 MG/DL (ref 8–30)

## 2023-01-11 DIAGNOSIS — D72.829 LEUKOCYTOSIS, UNSPECIFIED TYPE: Primary | ICD-10-CM

## 2023-01-11 DIAGNOSIS — R97.20 ELEVATED PSA: ICD-10-CM

## 2023-01-11 DIAGNOSIS — D75.1 POLYCYTHEMIA: ICD-10-CM

## 2023-01-11 DIAGNOSIS — E78.5 DYSLIPIDEMIA: ICD-10-CM

## 2023-01-11 RX ORDER — ATORVASTATIN CALCIUM 40 MG/1
40 TABLET, FILM COATED ORAL DAILY
Qty: 30 TABLET | Refills: 2 | Status: SHIPPED | OUTPATIENT
Start: 2023-01-11

## 2023-01-23 ENCOUNTER — TELEPHONE (OUTPATIENT)
Dept: PULMONOLOGY | Age: 68
End: 2023-01-23

## 2023-01-23 NOTE — TELEPHONE ENCOUNTER
Pt would like to speak with the nurse in regards to his machine as to lowering the air pressure.  Please call [phone]

## 2023-01-24 NOTE — TELEPHONE ENCOUNTER
Called patient: After reviewing his settings on his BiPAP for the past several years and reviewing his most recent downloaded data, will make some adjustments. Not sure why/how is on a Bipap with only a 3 point different in his IPAP/EPAP. We do not have his PSG or titration study (Dr. Yamileth Sr). This being the case, will send an rx to his machine with an autoBipap range of 5-15 cwp, ramp is off, EPAP set at 5. Will review his download on Monday and call him to see if we need to make a definitive change. We might just need to go to regular APAP, we'll see. He seems okay with this plan. His main complaint is on-going aerophagia. Shahla Adkins

## 2023-01-30 ENCOUNTER — TELEPHONE (OUTPATIENT)
Dept: PULMONOLOGY | Age: 68
End: 2023-01-30

## 2023-01-30 NOTE — TELEPHONE ENCOUNTER
Called and spoke with patient and his wife. He feels like the adjusted settings are a little better but still needs a little tweaking. Not entirely sure why he is on Bipap. Will see if I can call [facility] and get some data from them as to what settings his last machine had and if they have any of his sleep studies. For now, have turned down his pressure support to 2 cwp.

## 2023-02-13 DIAGNOSIS — R97.20 ELEVATED PSA: Primary | ICD-10-CM

## 2023-03-16 ENCOUNTER — OFFICE VISIT (OUTPATIENT)
Facility: CLINIC | Age: 68
End: 2023-03-16
Payer: MEDICARE

## 2023-03-16 VITALS
HEART RATE: 81 BPM | HEIGHT: 72 IN | DIASTOLIC BLOOD PRESSURE: 77 MMHG | RESPIRATION RATE: 18 BRPM | BODY MASS INDEX: 32.72 KG/M2 | SYSTOLIC BLOOD PRESSURE: 138 MMHG | TEMPERATURE: 98.3 F | WEIGHT: 241.6 LBS

## 2023-03-16 DIAGNOSIS — I10 ESSENTIAL (PRIMARY) HYPERTENSION: Primary | ICD-10-CM

## 2023-03-16 DIAGNOSIS — E66.9 OBESITY (BMI 30-39.9): ICD-10-CM

## 2023-03-16 DIAGNOSIS — E78.5 HYPERLIPIDEMIA, UNSPECIFIED HYPERLIPIDEMIA TYPE: ICD-10-CM

## 2023-03-16 DIAGNOSIS — G47.30 SLEEP APNEA, UNSPECIFIED TYPE: ICD-10-CM

## 2023-03-16 DIAGNOSIS — N40.1 BENIGN PROSTATIC HYPERPLASIA WITH LOWER URINARY TRACT SYMPTOMS, SYMPTOM DETAILS UNSPECIFIED: ICD-10-CM

## 2023-03-16 DIAGNOSIS — D75.1 SECONDARY POLYCYTHEMIA: ICD-10-CM

## 2023-03-16 DIAGNOSIS — R97.20 ELEVATED PROSTATE SPECIFIC ANTIGEN (PSA): ICD-10-CM

## 2023-03-16 DIAGNOSIS — M25.441 SWELLING OF FINGER JOINT OF RIGHT HAND: ICD-10-CM

## 2023-03-16 DIAGNOSIS — M72.2 PLANTAR FASCIAL FIBROMATOSIS: ICD-10-CM

## 2023-03-16 PROCEDURE — G8417 CALC BMI ABV UP PARAM F/U: HCPCS | Performed by: FAMILY MEDICINE

## 2023-03-16 PROCEDURE — 3075F SYST BP GE 130 - 139MM HG: CPT | Performed by: FAMILY MEDICINE

## 2023-03-16 PROCEDURE — 3017F COLORECTAL CA SCREEN DOC REV: CPT | Performed by: FAMILY MEDICINE

## 2023-03-16 PROCEDURE — 3078F DIAST BP <80 MM HG: CPT | Performed by: FAMILY MEDICINE

## 2023-03-16 PROCEDURE — 1036F TOBACCO NON-USER: CPT | Performed by: FAMILY MEDICINE

## 2023-03-16 PROCEDURE — 1123F ACP DISCUSS/DSCN MKR DOCD: CPT | Performed by: FAMILY MEDICINE

## 2023-03-16 PROCEDURE — G8484 FLU IMMUNIZE NO ADMIN: HCPCS | Performed by: FAMILY MEDICINE

## 2023-03-16 PROCEDURE — G8427 DOCREV CUR MEDS BY ELIG CLIN: HCPCS | Performed by: FAMILY MEDICINE

## 2023-03-16 PROCEDURE — 99214 OFFICE O/P EST MOD 30 MIN: CPT | Performed by: FAMILY MEDICINE

## 2023-03-16 RX ORDER — CANDESARTAN 32 MG/1
TABLET ORAL
Qty: 90 TABLET | Refills: 1 | Status: SHIPPED | OUTPATIENT
Start: 2023-03-16

## 2023-03-16 RX ORDER — HYDROCHLOROTHIAZIDE 25 MG/1
TABLET ORAL
Qty: 90 TABLET | Refills: 1 | Status: SHIPPED | OUTPATIENT
Start: 2023-03-16

## 2023-03-16 RX ORDER — POTASSIUM CHLORIDE 750 MG/1
10 TABLET, EXTENDED RELEASE ORAL DAILY
Qty: 90 TABLET | Refills: 1 | Status: SHIPPED | OUTPATIENT
Start: 2023-03-16

## 2023-03-16 RX ORDER — SILDENAFIL 100 MG/1
TABLET, FILM COATED ORAL
COMMUNITY
Start: 2023-02-08

## 2023-03-16 SDOH — ECONOMIC STABILITY: FOOD INSECURITY: WITHIN THE PAST 12 MONTHS, THE FOOD YOU BOUGHT JUST DIDN'T LAST AND YOU DIDN'T HAVE MONEY TO GET MORE.: NEVER TRUE

## 2023-03-16 SDOH — ECONOMIC STABILITY: FOOD INSECURITY: WITHIN THE PAST 12 MONTHS, YOU WORRIED THAT YOUR FOOD WOULD RUN OUT BEFORE YOU GOT MONEY TO BUY MORE.: NEVER TRUE

## 2023-03-16 SDOH — ECONOMIC STABILITY: INCOME INSECURITY: HOW HARD IS IT FOR YOU TO PAY FOR THE VERY BASICS LIKE FOOD, HOUSING, MEDICAL CARE, AND HEATING?: NOT HARD AT ALL

## 2023-03-16 SDOH — ECONOMIC STABILITY: HOUSING INSECURITY
IN THE LAST 12 MONTHS, WAS THERE A TIME WHEN YOU DID NOT HAVE A STEADY PLACE TO SLEEP OR SLEPT IN A SHELTER (INCLUDING NOW)?: NO

## 2023-03-16 ASSESSMENT — PATIENT HEALTH QUESTIONNAIRE - PHQ9
1. LITTLE INTEREST OR PLEASURE IN DOING THINGS: 0
SUM OF ALL RESPONSES TO PHQ QUESTIONS 1-9: 0
2. FEELING DOWN, DEPRESSED OR HOPELESS: 0
SUM OF ALL RESPONSES TO PHQ9 QUESTIONS 1 & 2: 0

## 2023-03-16 NOTE — PROGRESS NOTES
1. \"Have you been to the ER, urgent care clinic since your last visit? Hospitalized since your last visit? \"No    2. \"Have you seen or consulted any other health care providers outside of the [address] [place] [address] since your last visit? \" No    3. For patients aged 39-70: Has the patient had a colonoscopy / FIT/ Cologuard? Yes      If the patient is female:    4. For patients aged 41-77: Has the patient had a mammogram within the past 2 years? Not applicable      5. For patients aged 21-65: Has the patient had a pap smear?  Not applicable

## 2023-03-16 NOTE — PROGRESS NOTES
[facility]  Karsten Olvear comes in for follow-up care. Hypertension: Patient has hypertension. Blood pressure is stable. Patient is on candesartan and HCTZ. Denies headache, changes in vision or focal weakness. We will send in a refill of medication. We will recheck labs. Dyslipidemia: Patient has dyslipidemia. He is exercising and taking a diet low in polysaturated fats. LDL cholesterol has come down. He will continue current treatment plan. Does not want any statin medication. ED: Patient has erectile dysfunction. Followed by urologist.  He is on Viagra. Elevated PSA: Patient has a history of elevated PSA. He has been followed up by the urologist.  Currently getting serial PSAs done. Bladder atony: Patient has atonic bladder. He does self-catheterization frequently. He is followed up by the urologist on this. Denies fever, chills or abdominal pain. Polycythemia: Patient noted to have polycythemia. He has been referred to the hematologist.  Was seen in the past and had phlebotomy done. Sleep apnea: Patient has sleep apnea. He is followed up in the sleep clinic. He will follow-up with the recommendations of the sleep specialist.  Finger swelling: Patient has swelling right middle finger. Has difficulty in flexion of the finger. This has been ongoing for 3 weeks. She denies trauma to the finger. Question of arthritis. I will order an x-ray of the finger for evaluation and management  Planter fasciitis: Patient has plantar fasciitis and a heel spur right foot. He has been followed up by the podiatrist.  He has orthotics that he is using at the moment. He will continue with supportive care and follow-up with the podiatrist.  Obesity: Patient has a pair of 32.77. He will intensify lifestyle and dietary modification.       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 Dr. Diamante Resendiz at [place] [id number]  12/19/2018    First and second stage InterStim placement of Right tined lead, implant of pulse generator on right side, impedance check. Dr. Oumou Zepeda at [facility]. UROLOGICAL SURGERY  06/15/2022    CYSTOSCOPY, RIGHT URETEROSCOPY, LASER LITHOTRIPSY , RIGHT STENT PLACEMENT AND CYSTOLITHOPAXY; Dr. Grubbs [facility]  10/11/2017    S/p cysto, PVP (ProTouch laser), Cystolitholapaxy         Medications  Current Outpatient Medications   Medication Sig Dispense Refill    sildenafil (VIAGRA) 100 MG tablet TAKE 1 TABLET BY MOUTH ONCE DAILY AS NEEDED FOR ERECTILE DYSFUNCTION - MAX. 1 DAILY      acetaminophen (TYLENOL) 500 MG tablet Take by mouth every 6 hours as needed      candesartan (ATACAND) 32 MG tablet TAKE 1 TABLET BY MOUTH EVERY DAY      vitamin D 25 MCG (1000 UT) CAPS Take by mouth daily      cyanocobalamin 1000 MCG tablet Take 1,000 mcg by mouth daily      hydroCHLOROthiazide (HYDRODIURIL) 25 MG tablet TAKE 1 TABLET BY MOUTH EVERY DAY      potassium chloride (KLOR-CON M) 10 MEQ extended release tablet Take 10 mEq by mouth daily       No current facility-administered medications for this visit.        Allergies  No Known Allergies    Family History  Family History   Problem Relation Age of Onset    Heart Disease Father     Diabetes Brother     Cancer Mother     Hypertension Mother        Social History  Social History     Socioeconomic History    Marital status:      Spouse name: Not on file    Number of children: Not on file    Years of education: Not on file    Highest education level: Not on file   Occupational History    Not on file   Tobacco Use    Smoking status: Never    Smokeless tobacco: Never   Substance and Sexual Activity    Alcohol use: No    Drug use: No    Sexual activity: Not on file   Other Topics Concern    Not on file   Social History Narrative    Not on file     Social Determinants of Health     Financial Resource Strain: Not on file   Food Insecurity: No Food Insecurity    Worried About Running Out of Food in the Last Year: Never true    [address] in the Last Year: Never true   Transportation Needs: Unknown    Lack of Transportation (Medical): Not on file    Lack of Transportation (Non-Medical): No   Physical Activity: Not on file   Stress: Not on file   Social Connections: Not on file   Intimate Partner Violence: Not on file   Housing Stability: Unknown    Unable to Pay for Housing in the Last Year: Not on file    Number of Places Lived in the Last Year: Not on file    Unstable Housing in the Last Year: No       Review of Systems  Review of Systems - Review of all systems is negative except as noted above in the HPI.     Vital Signs  BP (!) 159/83   Pulse 81   Temp 98.3 °F (36.8 °C) (Temporal)   Resp 18   Ht 6' (1.829 m)   Wt 241 lb 9.6 oz (109.6 kg)   BMI 32.77 kg/m²       Physical Exam  Physical Examination: General appearance - oriented to person, place, and time, overweight, and acyanotic, in no respiratory distress  Mental status - affect appropriate to mood  Neck - supple, no significant adenopathy  Lymphatics - no palpable lymphadenopathy, no hepatosplenomegaly  Chest - no tachypnea, retractions or cyanosis  Heart - S1 and S2 normal  Abdomen - no rebound tenderness noted  Back exam - limited range of motion  Neurological - normal muscle tone, no tremors, strength 5/5  Musculoskeletal - osteoarthritic changes noted in both hands  Extremities - intact peripheral pulses      Results  No results found for this visit on 03/16/23. ASSESSMENT and PLAN    ICD-10-CM    1. Essential (primary) hypertension  I10 hydroCHLOROthiazide (HYDRODIURIL) 25 MG tablet     candesartan (ATACAND) 32 MG tablet      2. Hyperlipidemia, unspecified hyperlipidemia type  E78.5       3. Sleep apnea, unspecified type  G47.30       4. Swelling of finger joint of right hand  M25.441 XR HAND RIGHT (MIN 3 VIEWS)      5. Plantar fascial fibromatosis  M72.2       6. Secondary polycythemia  D75.1       7. Benign prostatic hyperplasia with lower urinary tract symptoms, symptom details unspecified  N40.1       8. Elevated prostate specific antigen (PSA)  R97.20       9. Obesity (BMI 30-39. 9)  E66.9       lab results and schedule of future lab studies reviewed with patient  reviewed diet, exercise and weight control  cardiovascular risk and specific lipid/LDL goals reviewed  reviewed medications and side effects in detail      I have discussed the diagnosis with the patient and the intended plan of care as seen in the above orders. The patient has received an after-visit summary and questions were answered concerning future plans. I have discussed medication, side effects, and warnings with the patient in detail. The patient verbalized understanding and is in agreement with the plan of care. The patient will contact the office with any additional concerns. Lata Ward MD    PLEASE NOTE:   This document has been produced using voice recognition software.  Unrecognized errors in transcription may be present

## 2023-05-15 ENCOUNTER — TELEPHONE (OUTPATIENT)
Age: 68
End: 2023-05-15

## 2023-05-15 NOTE — TELEPHONE ENCOUNTER
Talk to Mr. Rosalinda Sheriff today regarding his pressure setting. Dr. Guillaume Colon is going to change his range and if in a week that doesn't help Dr. Guillaume Colon would like him to come in and she will schedule him a HST.

## 2023-05-15 NOTE — TELEPHONE ENCOUNTER
Pt called to speak with someone about getting pressure settings for Bipap changed. Pt states that it is 'blowing past his diaphragm and into his stomach causing him to blow up like a balloon'.

## 2023-06-20 DIAGNOSIS — M25.441 SWELLING OF FINGER JOINT OF RIGHT HAND: ICD-10-CM

## 2023-07-17 ENCOUNTER — OFFICE VISIT (OUTPATIENT)
Facility: CLINIC | Age: 68
End: 2023-07-17
Payer: MEDICARE

## 2023-07-17 VITALS
WEIGHT: 238 LBS | SYSTOLIC BLOOD PRESSURE: 131 MMHG | RESPIRATION RATE: 18 BRPM | BODY MASS INDEX: 32.23 KG/M2 | DIASTOLIC BLOOD PRESSURE: 69 MMHG | OXYGEN SATURATION: 95 % | HEIGHT: 72 IN | HEART RATE: 80 BPM | TEMPERATURE: 97.8 F

## 2023-07-17 DIAGNOSIS — E78.5 HYPERLIPIDEMIA, UNSPECIFIED HYPERLIPIDEMIA TYPE: ICD-10-CM

## 2023-07-17 DIAGNOSIS — M20.001 FINGER DEFORMITY, RIGHT: ICD-10-CM

## 2023-07-17 DIAGNOSIS — I10 ESSENTIAL (PRIMARY) HYPERTENSION: Primary | ICD-10-CM

## 2023-07-17 DIAGNOSIS — M79.671 PAIN IN BOTH FEET: ICD-10-CM

## 2023-07-17 DIAGNOSIS — N20.0 CALCULUS OF KIDNEY: ICD-10-CM

## 2023-07-17 DIAGNOSIS — M79.672 PAIN IN BOTH FEET: ICD-10-CM

## 2023-07-17 PROCEDURE — G8417 CALC BMI ABV UP PARAM F/U: HCPCS | Performed by: FAMILY MEDICINE

## 2023-07-17 PROCEDURE — 1123F ACP DISCUSS/DSCN MKR DOCD: CPT | Performed by: FAMILY MEDICINE

## 2023-07-17 PROCEDURE — 99213 OFFICE O/P EST LOW 20 MIN: CPT | Performed by: FAMILY MEDICINE

## 2023-07-17 PROCEDURE — G8427 DOCREV CUR MEDS BY ELIG CLIN: HCPCS | Performed by: FAMILY MEDICINE

## 2023-07-17 PROCEDURE — 3075F SYST BP GE 130 - 139MM HG: CPT | Performed by: FAMILY MEDICINE

## 2023-07-17 PROCEDURE — 3017F COLORECTAL CA SCREEN DOC REV: CPT | Performed by: FAMILY MEDICINE

## 2023-07-17 PROCEDURE — 3078F DIAST BP <80 MM HG: CPT | Performed by: FAMILY MEDICINE

## 2023-07-17 PROCEDURE — 1036F TOBACCO NON-USER: CPT | Performed by: FAMILY MEDICINE

## 2023-07-17 RX ORDER — DICLOFENAC SODIUM 30 MG/G
GEL TOPICAL
Qty: 100 G | Refills: 2 | Status: SHIPPED | OUTPATIENT
Start: 2023-07-17

## 2023-07-17 SDOH — ECONOMIC STABILITY: INCOME INSECURITY: HOW HARD IS IT FOR YOU TO PAY FOR THE VERY BASICS LIKE FOOD, HOUSING, MEDICAL CARE, AND HEATING?: SOMEWHAT HARD

## 2023-07-17 SDOH — ECONOMIC STABILITY: FOOD INSECURITY: WITHIN THE PAST 12 MONTHS, THE FOOD YOU BOUGHT JUST DIDN'T LAST AND YOU DIDN'T HAVE MONEY TO GET MORE.: NEVER TRUE

## 2023-07-17 SDOH — ECONOMIC STABILITY: FOOD INSECURITY: WITHIN THE PAST 12 MONTHS, YOU WORRIED THAT YOUR FOOD WOULD RUN OUT BEFORE YOU GOT MONEY TO BUY MORE.: NEVER TRUE

## 2023-07-17 ASSESSMENT — PATIENT HEALTH QUESTIONNAIRE - PHQ9
SUM OF ALL RESPONSES TO PHQ QUESTIONS 1-9: 0
1. LITTLE INTEREST OR PLEASURE IN DOING THINGS: 0
SUM OF ALL RESPONSES TO PHQ QUESTIONS 1-9: 0
SUM OF ALL RESPONSES TO PHQ9 QUESTIONS 1 & 2: 0
2. FEELING DOWN, DEPRESSED OR HOPELESS: 0

## 2023-09-19 RX ORDER — POTASSIUM CHLORIDE 750 MG/1
10 TABLET, EXTENDED RELEASE ORAL DAILY
Qty: 90 TABLET | Refills: 1 | Status: SHIPPED | OUTPATIENT
Start: 2023-09-19

## 2023-09-22 ENCOUNTER — OFFICE VISIT (OUTPATIENT)
Age: 68
End: 2023-09-22
Payer: MEDICARE

## 2023-09-22 VITALS — BODY MASS INDEX: 31.69 KG/M2 | HEIGHT: 72 IN | WEIGHT: 234 LBS

## 2023-09-22 DIAGNOSIS — M15.2 DEGENERATIVE ARTHRITIS OF PROXIMAL INTERPHALANGEAL JOINT OF MIDDLE FINGER OF RIGHT HAND: Primary | ICD-10-CM

## 2023-09-22 PROCEDURE — 99203 OFFICE O/P NEW LOW 30 MIN: CPT | Performed by: ORTHOPAEDIC SURGERY

## 2023-09-22 PROCEDURE — G8427 DOCREV CUR MEDS BY ELIG CLIN: HCPCS | Performed by: ORTHOPAEDIC SURGERY

## 2023-09-22 PROCEDURE — 3017F COLORECTAL CA SCREEN DOC REV: CPT | Performed by: ORTHOPAEDIC SURGERY

## 2023-09-22 PROCEDURE — 1036F TOBACCO NON-USER: CPT | Performed by: ORTHOPAEDIC SURGERY

## 2023-09-22 PROCEDURE — G8417 CALC BMI ABV UP PARAM F/U: HCPCS | Performed by: ORTHOPAEDIC SURGERY

## 2023-09-22 PROCEDURE — 1123F ACP DISCUSS/DSCN MKR DOCD: CPT | Performed by: ORTHOPAEDIC SURGERY

## 2023-09-22 RX ORDER — MELOXICAM 15 MG/1
15 TABLET ORAL DAILY
Qty: 30 TABLET | Refills: 0 | Status: SHIPPED | OUTPATIENT
Start: 2023-09-22

## 2023-11-02 ENCOUNTER — TELEPHONE (OUTPATIENT)
Age: 68
End: 2023-11-02

## 2023-11-02 NOTE — TELEPHONE ENCOUNTER
Pt states he is having issues with the sleep machine he is using. Cannot wear for more than 3-4 hours and causes him to become bloated.  Please advise [phone]

## 2023-11-03 ENCOUNTER — TELEPHONE (OUTPATIENT)
Age: 68
End: 2023-11-03

## 2023-11-03 NOTE — TELEPHONE ENCOUNTER
Spoke with Mr. Candyce Skiff regarding his message stating that he is having problems with is BiPAP pressure. He stated that he is experiencing discomfort. I informed Mr. Candyce Skiff that we have an opening on 11-6-2023 at 10:10. Mr. Candyce Skiff stated that he would like to schedule an appointment for that time.

## 2023-11-06 ENCOUNTER — OFFICE VISIT (OUTPATIENT)
Age: 68
End: 2023-11-06
Payer: MEDICARE

## 2023-11-06 ENCOUNTER — CLINICAL DOCUMENTATION (OUTPATIENT)
Age: 68
End: 2023-11-06

## 2023-11-06 VITALS
HEART RATE: 60 BPM | TEMPERATURE: 97 F | DIASTOLIC BLOOD PRESSURE: 82 MMHG | RESPIRATION RATE: 18 BRPM | OXYGEN SATURATION: 95 % | BODY MASS INDEX: 31.15 KG/M2 | WEIGHT: 230 LBS | SYSTOLIC BLOOD PRESSURE: 151 MMHG | HEIGHT: 72 IN

## 2023-11-06 DIAGNOSIS — D75.1 POLYCYTHEMIA: ICD-10-CM

## 2023-11-06 DIAGNOSIS — I10 PRIMARY HYPERTENSION: ICD-10-CM

## 2023-11-06 DIAGNOSIS — G47.33 OBSTRUCTIVE SLEEP APNEA OF ADULT: Primary | ICD-10-CM

## 2023-11-06 DIAGNOSIS — F45.8 AEROPHAGIA: ICD-10-CM

## 2023-11-06 PROCEDURE — 1123F ACP DISCUSS/DSCN MKR DOCD: CPT | Performed by: OTOLARYNGOLOGY

## 2023-11-06 PROCEDURE — 99204 OFFICE O/P NEW MOD 45 MIN: CPT | Performed by: OTOLARYNGOLOGY

## 2023-11-06 PROCEDURE — 3079F DIAST BP 80-89 MM HG: CPT | Performed by: OTOLARYNGOLOGY

## 2023-11-06 PROCEDURE — 3077F SYST BP >= 140 MM HG: CPT | Performed by: OTOLARYNGOLOGY

## 2023-11-06 RX ORDER — IBUPROFEN 600 MG/1
TABLET ORAL
COMMUNITY
Start: 2023-09-26

## 2023-11-06 RX ORDER — AMOXICILLIN 500 MG/1
CAPSULE ORAL
COMMUNITY
Start: 2023-09-26

## 2023-11-06 ASSESSMENT — SLEEP AND FATIGUE QUESTIONNAIRES
HOW LIKELY ARE YOU TO NOD OFF OR FALL ASLEEP WHEN YOU ARE A PASSENGER IN A CAR FOR AN HOUR WITHOUT A BREAK: 1
HOW LIKELY ARE YOU TO NOD OFF OR FALL ASLEEP WHILE SITTING QUIETLY AFTER LUNCH WITHOUT ALCOHOL: 1
HOW LIKELY ARE YOU TO NOD OFF OR FALL ASLEEP WHILE LYING DOWN TO REST IN THE AFTERNOON WHEN CIRCUMSTANCES PERMIT: 3
HOW LIKELY ARE YOU TO NOD OFF OR FALL ASLEEP WHILE SITTING AND READING: 0
HOW LIKELY ARE YOU TO NOD OFF OR FALL ASLEEP WHILE WATCHING TV: 2
HOW LIKELY ARE YOU TO NOD OFF OR FALL ASLEEP WHILE SITTING AND TALKING TO SOMEONE: 0
HOW LIKELY ARE YOU TO NOD OFF OR FALL ASLEEP IN A CAR, WHILE STOPPED FOR A FEW MINUTES IN TRAFFIC: 0
HOW LIKELY ARE YOU TO NOD OFF OR FALL ASLEEP WHILE SITTING INACTIVE IN A PUBLIC PLACE: 0
ESS TOTAL SCORE: 7

## 2023-11-06 NOTE — PROGRESS NOTES
Javier Brandon presents today for   Chief Complaint   Patient presents with    Follow-up     Discomfort using BiPAP       Is someone accompanying this pt? no    Is the patient using any DME equipment during OV? no    -DME Company: [employer]    Depression Screenin/17/2023    11:00 AM   PHQ-9    Little interest or pleasure in doing things 0   Feeling down, depressed, or hopeless 0   PHQ-2 Score 0   PHQ-9 Total Score 0        [place] Sleepiness Scale:      2023    10:08 AM   Sleep Medicine   Sitting and reading 0   Watching TV 2   Sitting, inactive in a public place (e.g. a theatre or a meeting) 0   As a passenger in a car for an hour without a break 1   Lying down to rest in the afternoon when circumstances permit 3   Sitting and talking to someone 0   Sitting quietly after a lunch without alcohol 1   In a car, while stopped for a few minutes in traffic 0   [place] Sleepiness Score 7                 Coordination of Care:  1. Have you been to the ER, urgent care clinic since your last visit? Hospitalized since your last visit?  no    2. Have you seen or consulted any other health care providers outside of the [address] [place] [address] since your last visit? Include any pap smears or colon screening. no    Medication list has been updated according to patient.

## 2023-11-06 NOTE — PATIENT INSTRUCTIONS
Please make a follow up appointment to discuss the results of your sleep study. If this is impossible for some reason, please send me a \"My Chart\" message so that I may get back with you in a timely manner. The [facility] is located in the [address] [facility], adjacent to [facility]. The lab is on the second floor. The direct number to call for sleep study related questions is: [phone]. Please call our clinic back at [phone] or send a message on [employer] if you have any questions or concerns or if you are experiencing any of the following: You have not received a follow up appointment within 30 days prior the recommended follow up time. If you are not tolerating treatment plan and/or not able to obtain equipment or prescribed medication(s). if you are experiencing any difficulties with the [address] [place] [address] [employer]  (DME) Company you may be using or is assigned to you. Two weeks have passed and you have not received an appointment for a scheduled procedure. Two weeks have passed since you underwent a test and/or procedure and you have not received your results. If you are using a CPAP/BIPAP, or Home Ventilator Device- Please note the following. Currently, many DMEs are experiencing supply chain difficulties and orders for equipment may be back logged several weeks. Your  Durable Medical Equipment (DME ) company is supposed to provide you with replacement filters, tubing and masks. You can either call your DME when you need new supplies or you can arrange for an automatic shipment schedule. Your need to be seen by our office at lat minimum of every 12 months in order to renew the prescription for these supplies. Please make note of who your DME company is and their phone number. Please make sure that you clean your mask and hosing on a regular basis.   Your DME can provide you with additional information regarding proper care and cleaning of your

## 2023-11-06 NOTE — PROGRESS NOTES
[facility]   Pulmonary, Critical Care, and Sleep Medicine      Office Progress Note       Primary Care Physician: Cydney Quintanilla MD       Reason for Visit: Ongoing care for obstructive sleep apnea - follow up       Assessment:  1. Obstructive sleep apnea of adult  -     CPAP titration (95068); Future  2. Aerophagia  3. Polycythemia  Assessment & Plan:    h/o, lab summary below, is followed by Heme onc  4. Primary hypertension  Assessment & Plan:     He is on candesartan and HCTZ, follow with cardiologist     Mr. Candyce Skiff presents today for his annual follow-up but also with significant complaints of aerophagia that is been going on for about 2 years. We have tried a couple of different settings on his machine but is unclear why he continues to have this issue. He does not eat late at night, he has a nasal mask. He did say that the last machine he had he did not have this problem. He has not gained or lost a significant amount of weight and is not sure why this keeps happening to him almost on a nightly basis. We discussed today is setting his current bilevel machine to the auto mode at the default settings. In the interim I am going to order another CPAP titration and wondering if perhaps he does not need a bilevel machine at all. It is difficult to determine this by looking back through his sleep records from Dr. Nicholas Cruz office. I cannot figure out what his old machine was set at at this point in time. In any event, he does have severe obstructive sleep apnea and knows why it is important to continue to treat his YULIANA. He is getting some benefit from the machine and he does have follow-up for his chronic medical conditions with his primary care doctor. His blood pressure was mildly elevated today at 151/82 but overall this is stable and he is asymptomatic and has follow-up for this with cardiology.     His polycythemia is stable at this point in time and he does not need

## 2023-11-07 DIAGNOSIS — G47.33 OBSTRUCTIVE SLEEP APNEA OF ADULT: Primary | ICD-10-CM

## 2023-11-08 ENCOUNTER — HOSPITAL ENCOUNTER (OUTPATIENT)
Dept: SLEEP MEDICINE | Facility: HOSPITAL | Age: 68
Discharge: HOME | End: 2023-11-11
Attending: OTOLARYNGOLOGY
Payer: MEDICARE

## 2023-11-08 DIAGNOSIS — G47.33 OBSTRUCTIVE SLEEP APNEA OF ADULT: ICD-10-CM

## 2023-11-08 PROCEDURE — 95811 POLYSOM 6/>YRS CPAP 4/> PARM: CPT

## 2023-11-08 ASSESSMENT — SLEEP AND FATIGUE QUESTIONNAIRES
HOW LIKELY ARE YOU TO NOD OFF OR FALL ASLEEP WHILE SITTING AND READING: 1
HOW LIKELY ARE YOU TO NOD OFF OR FALL ASLEEP WHILE LYING DOWN TO REST IN THE AFTERNOON WHEN CIRCUMSTANCES PERMIT: 3
HOW LIKELY ARE YOU TO NOD OFF OR FALL ASLEEP IN A CAR, WHILE STOPPED FOR A FEW MINUTES IN TRAFFIC: 0
HOW LIKELY ARE YOU TO NOD OFF OR FALL ASLEEP WHILE SITTING QUIETLY AFTER LUNCH WITHOUT ALCOHOL: 1
HOW LIKELY ARE YOU TO NOD OFF OR FALL ASLEEP WHILE WATCHING TV: 3
HOW LIKELY ARE YOU TO NOD OFF OR FALL ASLEEP WHILE SITTING INACTIVE IN A PUBLIC PLACE: 1
HOW LIKELY ARE YOU TO NOD OFF OR FALL ASLEEP WHILE SITTING AND TALKING TO SOMEONE: 0
ESS TOTAL SCORE: 11
HOW LIKELY ARE YOU TO NOD OFF OR FALL ASLEEP WHEN YOU ARE A PASSENGER IN A CAR FOR AN HOUR WITHOUT A BREAK: 2

## 2023-11-09 VITALS
SYSTOLIC BLOOD PRESSURE: 124 MMHG | DIASTOLIC BLOOD PRESSURE: 79 MMHG | HEIGHT: 72 IN | BODY MASS INDEX: 31.42 KG/M2 | WEIGHT: 232 LBS | HEART RATE: 70 BPM

## 2023-11-19 SDOH — HEALTH STABILITY: PHYSICAL HEALTH: ON AVERAGE, HOW MANY DAYS PER WEEK DO YOU ENGAGE IN MODERATE TO STRENUOUS EXERCISE (LIKE A BRISK WALK)?: 5 DAYS

## 2023-11-19 SDOH — HEALTH STABILITY: PHYSICAL HEALTH: ON AVERAGE, HOW MANY MINUTES DO YOU ENGAGE IN EXERCISE AT THIS LEVEL?: 60 MIN

## 2023-11-19 ASSESSMENT — LIFESTYLE VARIABLES
HOW OFTEN DURING THE LAST YEAR HAVE YOU FOUND THAT YOU WERE NOT ABLE TO STOP DRINKING ONCE YOU HAD STARTED: 0
HOW OFTEN DURING THE LAST YEAR HAVE YOU FAILED TO DO WHAT WAS NORMALLY EXPECTED FROM YOU BECAUSE OF DRINKING: 0
HOW MANY STANDARD DRINKS CONTAINING ALCOHOL DO YOU HAVE ON A TYPICAL DAY: 1 OR 2
HAVE YOU OR SOMEONE ELSE BEEN INJURED AS A RESULT OF YOUR DRINKING: 0
HOW OFTEN DURING THE LAST YEAR HAVE YOU NEEDED AN ALCOHOLIC DRINK FIRST THING IN THE MORNING TO GET YOURSELF GOING AFTER A NIGHT OF HEAVY DRINKING: NEVER
HOW OFTEN DO YOU HAVE A DRINK CONTAINING ALCOHOL: MONTHLY OR LESS
HOW OFTEN DURING THE LAST YEAR HAVE YOU FOUND THAT YOU WERE NOT ABLE TO STOP DRINKING ONCE YOU HAD STARTED: NEVER
HOW OFTEN DO YOU HAVE SIX OR MORE DRINKS ON ONE OCCASION: 2
HOW OFTEN DURING THE LAST YEAR HAVE YOU HAD A FEELING OF GUILT OR REMORSE AFTER DRINKING: 0
HOW OFTEN DO YOU HAVE A DRINK CONTAINING ALCOHOL: 2
HAS A RELATIVE, FRIEND, DOCTOR, OR ANOTHER HEALTH PROFESSIONAL EXPRESSED CONCERN ABOUT YOUR DRINKING OR SUGGESTED YOU CUT DOWN: NO
HAVE YOU OR SOMEONE ELSE BEEN INJURED AS A RESULT OF YOUR DRINKING: NO
HOW OFTEN DURING THE LAST YEAR HAVE YOU NEEDED AN ALCOHOLIC DRINK FIRST THING IN THE MORNING TO GET YOURSELF GOING AFTER A NIGHT OF HEAVY DRINKING: 0
HAS A RELATIVE, FRIEND, DOCTOR, OR ANOTHER HEALTH PROFESSIONAL EXPRESSED CONCERN ABOUT YOUR DRINKING OR SUGGESTED YOU CUT DOWN: 0
HOW OFTEN DURING THE LAST YEAR HAVE YOU BEEN UNABLE TO REMEMBER WHAT HAPPENED THE NIGHT BEFORE BECAUSE YOU HAD BEEN DRINKING: 0
HOW OFTEN DURING THE LAST YEAR HAVE YOU BEEN UNABLE TO REMEMBER WHAT HAPPENED THE NIGHT BEFORE BECAUSE YOU HAD BEEN DRINKING: NEVER
HOW MANY STANDARD DRINKS CONTAINING ALCOHOL DO YOU HAVE ON A TYPICAL DAY: 1
HOW OFTEN DURING THE LAST YEAR HAVE YOU HAD A FEELING OF GUILT OR REMORSE AFTER DRINKING: NEVER
HOW OFTEN DURING THE LAST YEAR HAVE YOU FAILED TO DO WHAT WAS NORMALLY EXPECTED FROM YOU BECAUSE OF DRINKING: NEVER

## 2023-11-19 ASSESSMENT — PATIENT HEALTH QUESTIONNAIRE - PHQ9
SUM OF ALL RESPONSES TO PHQ QUESTIONS 1-9: 0
SUM OF ALL RESPONSES TO PHQ9 QUESTIONS 1 & 2: 0
1. LITTLE INTEREST OR PLEASURE IN DOING THINGS: 0
2. FEELING DOWN, DEPRESSED OR HOPELESS: 0
SUM OF ALL RESPONSES TO PHQ QUESTIONS 1-9: 0

## 2023-11-20 ENCOUNTER — OFFICE VISIT (OUTPATIENT)
Facility: CLINIC | Age: 68
End: 2023-11-20

## 2023-11-20 VITALS
WEIGHT: 231 LBS | DIASTOLIC BLOOD PRESSURE: 73 MMHG | RESPIRATION RATE: 20 BRPM | SYSTOLIC BLOOD PRESSURE: 129 MMHG | TEMPERATURE: 97.8 F | HEIGHT: 72 IN | OXYGEN SATURATION: 97 % | BODY MASS INDEX: 31.29 KG/M2 | HEART RATE: 79 BPM

## 2023-11-20 DIAGNOSIS — D75.1 SECONDARY POLYCYTHEMIA: ICD-10-CM

## 2023-11-20 DIAGNOSIS — E78.5 HYPERLIPIDEMIA, UNSPECIFIED HYPERLIPIDEMIA TYPE: ICD-10-CM

## 2023-11-20 DIAGNOSIS — N40.1 BENIGN PROSTATIC HYPERPLASIA WITH LOWER URINARY TRACT SYMPTOMS, SYMPTOM DETAILS UNSPECIFIED: ICD-10-CM

## 2023-11-20 DIAGNOSIS — Z12.5 SCREENING FOR MALIGNANT NEOPLASM OF PROSTATE: ICD-10-CM

## 2023-11-20 DIAGNOSIS — R73.9 HYPERGLYCEMIA: ICD-10-CM

## 2023-11-20 DIAGNOSIS — I10 ESSENTIAL (PRIMARY) HYPERTENSION: ICD-10-CM

## 2023-11-20 DIAGNOSIS — Z00.00 MEDICARE ANNUAL WELLNESS VISIT, SUBSEQUENT: Primary | ICD-10-CM

## 2023-11-20 DIAGNOSIS — G47.30 SLEEP APNEA, UNSPECIFIED TYPE: ICD-10-CM

## 2023-11-20 PROBLEM — N21.0 BLADDER STONE: Status: ACTIVE | Noted: 2017-10-11

## 2023-11-20 ASSESSMENT — PATIENT HEALTH QUESTIONNAIRE - PHQ9
SUM OF ALL RESPONSES TO PHQ QUESTIONS 1-9: 0
2. FEELING DOWN, DEPRESSED OR HOPELESS: 0
SUM OF ALL RESPONSES TO PHQ QUESTIONS 1-9: 0
1. LITTLE INTEREST OR PLEASURE IN DOING THINGS: 0
SUM OF ALL RESPONSES TO PHQ QUESTIONS 1-9: 0
SUM OF ALL RESPONSES TO PHQ9 QUESTIONS 1 & 2: 0
SUM OF ALL RESPONSES TO PHQ QUESTIONS 1-9: 0

## 2023-11-20 ASSESSMENT — LIFESTYLE VARIABLES
HAS A RELATIVE, FRIEND, DOCTOR, OR ANOTHER HEALTH PROFESSIONAL EXPRESSED CONCERN ABOUT YOUR DRINKING OR SUGGESTED YOU CUT DOWN: 0
HOW MANY STANDARD DRINKS CONTAINING ALCOHOL DO YOU HAVE ON A TYPICAL DAY: 1 OR 2
HOW OFTEN DURING THE LAST YEAR HAVE YOU BEEN UNABLE TO REMEMBER WHAT HAPPENED THE NIGHT BEFORE BECAUSE YOU HAD BEEN DRINKING: 0
HOW OFTEN DURING THE LAST YEAR HAVE YOU FOUND THAT YOU WERE NOT ABLE TO STOP DRINKING ONCE YOU HAD STARTED: 0
HAVE YOU OR SOMEONE ELSE BEEN INJURED AS A RESULT OF YOUR DRINKING: 0
HOW OFTEN DURING THE LAST YEAR HAVE YOU HAD A FEELING OF GUILT OR REMORSE AFTER DRINKING: 0
HOW OFTEN DURING THE LAST YEAR HAVE YOU NEEDED AN ALCOHOLIC DRINK FIRST THING IN THE MORNING TO GET YOURSELF GOING AFTER A NIGHT OF HEAVY DRINKING: 0
HOW OFTEN DO YOU HAVE A DRINK CONTAINING ALCOHOL: NEVER
HOW OFTEN DURING THE LAST YEAR HAVE YOU FAILED TO DO WHAT WAS NORMALLY EXPECTED FROM YOU BECAUSE OF DRINKING: 0

## 2023-11-20 NOTE — PROGRESS NOTES
1. \"Have you been to the ER, urgent care clinic since your last visit? Hospitalized since your last visit? \"No    2. \"Have you seen or consulted any other health care providers outside of the [address] [place] [address] since your last visit? \" No    3. For patients aged 43-73: Has the patient had a colonoscopy / FIT/ Cologuard? Yes      If the patient is female:    4. For patients aged 43-66: Has the patient had a mammogram within the past 2 years? Not applicable      5. For patients aged 21-65: Has the patient had a pap smear?  Not applicable

## 2023-11-20 NOTE — PROGRESS NOTES
PAWEL Benitezrené Kristi comes in for follow up care. Right foot and ankle pain: Patient has pain right foot and ankle. Pain comes with weightbearing and walking. He has been seen by the podiatrist.  He is scheduled to have surgery in February next year. Surgery is to elongate his the gastrocnemius due to tightness of the muscles and tendon. 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 We will request records from his podiatrist.  Currently he is doing supportive care. He is using an ankle brace. He takes ibuprofen for pain as needed. He will continue with management as recommended by the specialist.  Sleep apnea: Patient has sleep apnea. He has been seen by the sleep specialist.  Patient states that previously he was on a BiPAP. He recently underwent a recheck sleep study. He awaits the results. He will continue follow-up as recommended by the specialist.  HTN: Patient has hypertension. Blood pressure is stable. Denies headache, changes in vision or focal weakness. He is on candesartan and HCTZ. We will recheck labs at next visit. Continue current treatment plan. LUTS: Patient has neurogenic bladder. He is doing self-catheterization. This has been chronic for him. He is followed up by the urologist.  Elevated PSA: Patient has a history of elevated PSA. We will recheck this at next visit. He has been seen by the urologist.  Was advised to get serial rechecks of the urologist and he has an appointment for follow-up care. Obesity: Patient has a BMI of 31.33. He will intensify lifestyle and dietary modification.       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

## 2023-12-08 PROBLEM — G47.33 OBSTRUCTIVE SLEEP APNEA (ADULT) (PEDIATRIC): Status: ACTIVE | Noted: 2022-11-23

## 2023-12-08 PROBLEM — G47.61 PERIODIC LIMB MOVEMENTS OF SLEEP: Status: ACTIVE | Noted: 2023-12-08

## 2023-12-13 ENCOUNTER — TELEPHONE (OUTPATIENT)
Age: 68
End: 2023-12-13

## 2023-12-13 NOTE — TELEPHONE ENCOUNTER
Called patient to discuss cpap titration results. Got voicemail, left message for him to return my call.

## 2023-12-15 DIAGNOSIS — I10 ESSENTIAL (PRIMARY) HYPERTENSION: ICD-10-CM

## 2023-12-17 RX ORDER — CANDESARTAN 32 MG/1
TABLET ORAL
Qty: 90 TABLET | Refills: 1 | Status: SHIPPED | OUTPATIENT
Start: 2023-12-17

## 2024-01-08 ENCOUNTER — OFFICE VISIT (OUTPATIENT)
Facility: CLINIC | Age: 69
End: 2024-01-08
Payer: MEDICARE

## 2024-01-08 VITALS
BODY MASS INDEX: 31.02 KG/M2 | HEIGHT: 72 IN | TEMPERATURE: 98.3 F | DIASTOLIC BLOOD PRESSURE: 73 MMHG | OXYGEN SATURATION: 94 % | SYSTOLIC BLOOD PRESSURE: 126 MMHG | RESPIRATION RATE: 20 BRPM | WEIGHT: 229 LBS | HEART RATE: 81 BPM

## 2024-01-08 DIAGNOSIS — M19.071 OSTEOARTHRITIS OF RIGHT FOOT, UNSPECIFIED OSTEOARTHRITIS TYPE: ICD-10-CM

## 2024-01-08 DIAGNOSIS — G47.30 SLEEP APNEA, UNSPECIFIED TYPE: ICD-10-CM

## 2024-01-08 DIAGNOSIS — N40.1 BENIGN PROSTATIC HYPERPLASIA WITH LOWER URINARY TRACT SYMPTOMS, SYMPTOM DETAILS UNSPECIFIED: ICD-10-CM

## 2024-01-08 DIAGNOSIS — N52.9 ERECTILE DYSFUNCTION, UNSPECIFIED ERECTILE DYSFUNCTION TYPE: ICD-10-CM

## 2024-01-08 DIAGNOSIS — Z01.818 PREOP EXAMINATION: Primary | ICD-10-CM

## 2024-01-08 DIAGNOSIS — I10 ESSENTIAL (PRIMARY) HYPERTENSION: ICD-10-CM

## 2024-01-08 PROCEDURE — G8417 CALC BMI ABV UP PARAM F/U: HCPCS | Performed by: FAMILY MEDICINE

## 2024-01-08 PROCEDURE — G8484 FLU IMMUNIZE NO ADMIN: HCPCS | Performed by: FAMILY MEDICINE

## 2024-01-08 PROCEDURE — 1123F ACP DISCUSS/DSCN MKR DOCD: CPT | Performed by: FAMILY MEDICINE

## 2024-01-08 PROCEDURE — 3074F SYST BP LT 130 MM HG: CPT | Performed by: FAMILY MEDICINE

## 2024-01-08 PROCEDURE — 3078F DIAST BP <80 MM HG: CPT | Performed by: FAMILY MEDICINE

## 2024-01-08 PROCEDURE — 3017F COLORECTAL CA SCREEN DOC REV: CPT | Performed by: FAMILY MEDICINE

## 2024-01-08 PROCEDURE — 1036F TOBACCO NON-USER: CPT | Performed by: FAMILY MEDICINE

## 2024-01-08 PROCEDURE — 99214 OFFICE O/P EST MOD 30 MIN: CPT | Performed by: FAMILY MEDICINE

## 2024-01-08 PROCEDURE — G8427 DOCREV CUR MEDS BY ELIG CLIN: HCPCS | Performed by: FAMILY MEDICINE

## 2024-01-08 ASSESSMENT — PATIENT HEALTH QUESTIONNAIRE - PHQ9
1. LITTLE INTEREST OR PLEASURE IN DOING THINGS: 0
SUM OF ALL RESPONSES TO PHQ9 QUESTIONS 1 & 2: 0
SUM OF ALL RESPONSES TO PHQ QUESTIONS 1-9: 0
2. FEELING DOWN, DEPRESSED OR HOPELESS: 0
SUM OF ALL RESPONSES TO PHQ QUESTIONS 1-9: 0

## 2024-01-08 NOTE — PROGRESS NOTES
HPI  Stephen El comes in for preop evaluation.    Referring provider: Agustin Cleveland DPM  Date of procedure: 02/01/2024  Planned procedure: Right gastrocnemius recession, right talonavicular arthrodesis, right Achilles tendon lengthening, repair of right posterior tibial tendon  Indication for procedure: Accessory navicular osteoarthritis, equinus contracture, pronation    HPI:Stephen El is a patient who has accessory navicular osteoarthritis, equinus contracture.  He also has foot pain and discomfort with walking especially on the right calf.  He has been followed up by the specialist and is now scheduled to have above procedure done.  He comes in for preop evaluation.  Physical exam is reassuring.  Patient will get EKG, CBC and CMP done.  If these are stable patient will be cleared to proceed with planned procedure.    Hypertension: Patient has hypertension.  Blood pressure is stable.  Patient is on Atacand and HCTZ.  He takes potassium supplementation.  He will take a low-sodium diet.  CAD: Patient has erectile dysfunction.  He is on sildenafil.  Stable on medication.  Continue current treatment plan.  Sleep apnea: Patient has history of sleep apnea.  He has been followed up in the sleep clinic.  He would like to be seen by Dr. Damian and requests referral for this.  Referral is placed.  LUTS: Patient has lower urinary tract symptoms and atonic bladder.  He has been doing self-catheterization.  He has a history of UTIs.  Patient is on antibiotic medication.  He will call his urologist for follow-up care.  He denies dysuria or hematuria.        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

## 2024-01-08 NOTE — PROGRESS NOTES
1. \"Have you been to the ER, urgent care clinic since your last visit?  Hospitalized since your last visit?\"No    2. \"Have you seen or consulted any other health care providers outside of the [facility] since your last visit?\" No    3. For patients aged 45-75: Has the patient had a colonoscopy / FIT/ Cologuard? Yes      If the patient is female:    4. For patients aged 40-74: Has the patient had a mammogram within the past 2 years? Not applicable      5. For patients aged 21-65: Has the patient had a pap smear? Not applicable

## 2024-06-03 ENCOUNTER — OFFICE VISIT (OUTPATIENT)
Facility: CLINIC | Age: 69
End: 2024-06-03
Payer: MEDICARE

## 2024-06-03 VITALS
RESPIRATION RATE: 18 BRPM | HEIGHT: 72 IN | OXYGEN SATURATION: 97 % | TEMPERATURE: 97.8 F | WEIGHT: 235 LBS | BODY MASS INDEX: 31.83 KG/M2 | DIASTOLIC BLOOD PRESSURE: 76 MMHG | HEART RATE: 77 BPM | SYSTOLIC BLOOD PRESSURE: 132 MMHG

## 2024-06-03 DIAGNOSIS — N31.2 BLADDER ATONY: ICD-10-CM

## 2024-06-03 DIAGNOSIS — E66.9 OBESITY (BMI 30-39.9): ICD-10-CM

## 2024-06-03 DIAGNOSIS — M19.071 OSTEOARTHRITIS OF RIGHT FOOT, UNSPECIFIED OSTEOARTHRITIS TYPE: ICD-10-CM

## 2024-06-03 DIAGNOSIS — R73.9 HYPERGLYCEMIA: ICD-10-CM

## 2024-06-03 DIAGNOSIS — Z12.5 ENCOUNTER FOR SCREENING FOR MALIGNANT NEOPLASM OF PROSTATE: ICD-10-CM

## 2024-06-03 DIAGNOSIS — E78.5 HYPERLIPIDEMIA, UNSPECIFIED HYPERLIPIDEMIA TYPE: ICD-10-CM

## 2024-06-03 DIAGNOSIS — I10 ESSENTIAL (PRIMARY) HYPERTENSION: Primary | ICD-10-CM

## 2024-06-03 DIAGNOSIS — R97.20 ELEVATED PROSTATE SPECIFIC ANTIGEN (PSA): ICD-10-CM

## 2024-06-03 PROCEDURE — 1036F TOBACCO NON-USER: CPT | Performed by: FAMILY MEDICINE

## 2024-06-03 PROCEDURE — 1123F ACP DISCUSS/DSCN MKR DOCD: CPT | Performed by: FAMILY MEDICINE

## 2024-06-03 PROCEDURE — G8417 CALC BMI ABV UP PARAM F/U: HCPCS | Performed by: FAMILY MEDICINE

## 2024-06-03 PROCEDURE — 99213 OFFICE O/P EST LOW 20 MIN: CPT | Performed by: FAMILY MEDICINE

## 2024-06-03 PROCEDURE — 3078F DIAST BP <80 MM HG: CPT | Performed by: FAMILY MEDICINE

## 2024-06-03 PROCEDURE — 3017F COLORECTAL CA SCREEN DOC REV: CPT | Performed by: FAMILY MEDICINE

## 2024-06-03 PROCEDURE — 3075F SYST BP GE 130 - 139MM HG: CPT | Performed by: FAMILY MEDICINE

## 2024-06-03 PROCEDURE — G8427 DOCREV CUR MEDS BY ELIG CLIN: HCPCS | Performed by: FAMILY MEDICINE

## 2024-06-03 ASSESSMENT — PATIENT HEALTH QUESTIONNAIRE - PHQ9
SUM OF ALL RESPONSES TO PHQ QUESTIONS 1-9: 0
1. LITTLE INTEREST OR PLEASURE IN DOING THINGS: NOT AT ALL
2. FEELING DOWN, DEPRESSED OR HOPELESS: NOT AT ALL
SUM OF ALL RESPONSES TO PHQ QUESTIONS 1-9: 0
SUM OF ALL RESPONSES TO PHQ QUESTIONS 1-9: 0
SUM OF ALL RESPONSES TO PHQ9 QUESTIONS 1 & 2: 0
SUM OF ALL RESPONSES TO PHQ QUESTIONS 1-9: 0

## 2024-06-03 NOTE — PROGRESS NOTES
1. \"Have you been to the ER, urgent care clinic since your last visit?  Hospitalized since your last visit?\"No    2. \"Have you seen or consulted any other health care providers outside of the [facility] since your last visit?\" No    3. For patients aged 45-75: Has the patient had a colonoscopy / FIT/ Cologuard? Yes      If the patient is female:    4. For patients aged 40-74: Has the patient had a mammogram within the past 2 years? Not applicable      5. For patients aged 21-65: Has the patient had a pap smear? Not applicable  
Cystoscopy and UroLift (transprostatic implants # 7). Dr. Moody at [facility]    UROLOGICAL SURGERY  12/19/2018    First and second stage InterStim placement of Right tined lead, implant of pulse generator on right side, impedance check. Dr. Guzman at [facility].    UROLOGICAL SURGERY  06/15/2022    CYSTOSCOPY, RIGHT URETEROSCOPY, LASER LITHOTRIPSY , RIGHT STENT PLACEMENT AND CYSTOLITHOPAXY; Dr. Mahan    UROLOGICAL SURGERY  10/11/2017    S/p cysto, PVP (ProTouch laser), Cystolitholapaxy         Medications  Current Outpatient Medications   Medication Sig Dispense Refill    nitrofurantoin, macrocrystal-monohydrate, (MACROBID) 100 MG capsule Take 1 capsule by mouth 2 times daily (Patient taking differently: Take 1 capsule by mouth 2 times daily As needed per patient) 20 capsule 3    sildenafil (VIAGRA) 100 MG tablet TAKE 1 TABLET BY MOUTH ONCE DAILY AS NEEDED FOR ERECTILE DYSFUNCTION - MAX. 1 DAILY 30 tablet 5    candesartan (ATACAND) 32 MG tablet TAKE 1 TABLET BY MOUTH EVERY DAY 90 tablet 1    potassium chloride (KLOR-CON M) 10 MEQ extended release tablet TAKE 1 TABLET BY MOUTH DAILY 90 tablet 1    hydroCHLOROthiazide (HYDRODIURIL) 25 MG tablet TAKE 1 TABLET BY MOUTH EVERY DAY 90 tablet 1    acetaminophen (TYLENOL) 500 MG tablet Take by mouth every 6 hours as needed      vitamin D 25 MCG (1000 UT) CAPS Take by mouth daily      cyanocobalamin 1000 MCG tablet Take 1 tablet by mouth daily       No current facility-administered medications for this visit.       Allergies  No Known Allergies    Family History  Family History   Problem Relation Age of Onset    Heart Disease Father     Diabetes Brother     Cancer Mother     Hypertension Mother        Social History  Social History     Socioeconomic History    Marital status:      Spouse name: Not on file    Number of children: Not on file    Years of education: Not on file    Highest education level: Not on file   Occupational History    Not on file   Tobacco

## 2024-06-11 DIAGNOSIS — I10 ESSENTIAL (PRIMARY) HYPERTENSION: ICD-10-CM

## 2024-06-11 NOTE — TELEPHONE ENCOUNTER
Last seen 6/3/2024   Last labs 01/04/2023  Last filled  06/14/2023 hydroCHLOROthiazide   12/17/2023 candesartan   Next appointment 10/3/2024     Lab Results   Component Value Date     01/04/2023    K 3.5 01/04/2023    CL 97 (L) 01/04/2023    CO2 29 01/04/2023    BUN 17 01/04/2023    CREATININE 1.1 01/04/2023    GLUCOSE 109 (H) 01/04/2023    CALCIUM 9.6 01/04/2023    BILITOT 0.5 01/04/2023    ALKPHOS 74 01/04/2023    AST 16 01/04/2023    ALT 21 01/04/2023    LABGLOM >60.0 01/04/2023    GFRAA >60 12/02/2021    AGRATIO 1.5 01/04/2023    GLOB 3.0 01/04/2023

## 2024-06-12 LAB
A/G RATIO: 1.2 RATIO (ref 1.1–2.6)
ALBUMIN: 4.2 G/DL (ref 3.5–5)
ALP BLD-CCNC: 80 U/L (ref 40–125)
ALT SERPL-CCNC: 25 U/L (ref 5–40)
ANION GAP SERPL CALCULATED.3IONS-SCNC: 15 MMOL/L (ref 3–15)
AST SERPL-CCNC: 16 U/L (ref 10–37)
BASOPHILS ABSOLUTE: 0 K/UL (ref 0–0.2)
BASOPHILS RELATIVE PERCENT: 0 % (ref 0–2)
BILIRUB SERPL-MCNC: 0.8 MG/DL (ref 0.2–1.2)
BUN BLDV-MCNC: 15 MG/DL (ref 6–22)
CALCIUM SERPL-MCNC: 10.3 MG/DL (ref 8.4–10.5)
CHLORIDE BLD-SCNC: 98 MMOL/L (ref 98–110)
CHOLESTEROL, TOTAL: 190 MG/DL (ref 110–200)
CHOLESTEROL/HDL RATIO: 5.9 (ref 0–5)
CO2: 29 MMOL/L (ref 20–32)
CREAT SERPL-MCNC: 0.8 MG/DL (ref 0.8–1.6)
EOSINOPHIL # BLD: 1 % (ref 0–6)
EOSINOPHILS ABSOLUTE: 0.1 K/UL (ref 0–0.5)
ESTIMATED AVERAGE GLUCOSE: 141 MG/DL (ref 91–123)
GFR, ESTIMATED: >60 ML/MIN/1.73 SQ.M.
GLOBULIN: 3.4 G/DL (ref 2–4)
GLUCOSE: 144 MG/DL (ref 70–99)
HBA1C MFR BLD: 6.5 % (ref 4.8–5.6)
HCT VFR BLD CALC: 51.9 % (ref 37.8–52.2)
HDLC SERPL-MCNC: 32 MG/DL
HEMOGLOBIN: 17.3 G/DL (ref 12.6–17.1)
LDL CHOLESTEROL: 121 MG/DL (ref 50–99)
LDL/HDL RATIO: 3.8
LYMPHOCYTES # BLD: 18 % (ref 20–45)
LYMPHOCYTES ABSOLUTE: 2.3 K/UL (ref 1–4.8)
MCH RBC QN AUTO: 29 PG (ref 26–34)
MCHC RBC AUTO-ENTMCNC: 33 G/DL (ref 31–36)
MCV RBC AUTO: 88 FL (ref 80–95)
MONOCYTES ABSOLUTE: 1.1 K/UL (ref 0.1–1)
MONOCYTES: 9 % (ref 3–12)
NEUTROPHILS ABSOLUTE: 9.1 K/UL (ref 1.8–7.7)
NEUTROPHILS: 72 % (ref 40–75)
NON-HDL CHOLESTEROL: 158 MG/DL
PDW BLD-RTO: 13.3 % (ref 10–15.5)
PLATELET # BLD: 207 K/UL (ref 140–440)
PMV BLD AUTO: 10.8 FL (ref 9–13)
POTASSIUM SERPL-SCNC: 3.6 MMOL/L (ref 3.5–5.5)
PROSTATE SPECIFIC ANTIGEN: 5.03 NG/ML
RBC # BLD: 5.92 M/UL (ref 3.8–5.8)
SODIUM BLD-SCNC: 142 MMOL/L (ref 133–145)
TOTAL PROTEIN: 7.6 G/DL (ref 6.2–8.1)
TRIGL SERPL-MCNC: 183 MG/DL (ref 40–149)
VLDLC SERPL CALC-MCNC: 37 MG/DL (ref 8–30)
WBC # BLD: 12.7 K/UL (ref 4–11)

## 2024-06-12 RX ORDER — CANDESARTAN 32 MG/1
TABLET ORAL
Qty: 90 TABLET | Refills: 1 | Status: SHIPPED | OUTPATIENT
Start: 2024-06-12

## 2024-06-12 RX ORDER — HYDROCHLOROTHIAZIDE 25 MG/1
TABLET ORAL
Qty: 90 TABLET | Refills: 1 | Status: SHIPPED | OUTPATIENT
Start: 2024-06-12

## 2024-06-17 DIAGNOSIS — D75.1 POLYCYTHEMIA: Primary | ICD-10-CM

## 2024-06-17 DIAGNOSIS — E11.65 TYPE 2 DIABETES MELLITUS WITH HYPERGLYCEMIA, WITHOUT LONG-TERM CURRENT USE OF INSULIN (HCC): ICD-10-CM

## 2024-06-17 DIAGNOSIS — E78.5 HYPERLIPIDEMIA, UNSPECIFIED HYPERLIPIDEMIA TYPE: ICD-10-CM

## 2024-06-17 RX ORDER — METFORMIN HYDROCHLORIDE 500 MG/1
500 TABLET, EXTENDED RELEASE ORAL
Qty: 90 TABLET | Refills: 1 | Status: SHIPPED | OUTPATIENT
Start: 2024-06-17

## 2024-06-17 RX ORDER — ATORVASTATIN CALCIUM 20 MG/1
20 TABLET, FILM COATED ORAL DAILY
Qty: 30 TABLET | Refills: 3 | Status: SHIPPED | OUTPATIENT
Start: 2024-06-17

## 2024-06-17 RX ORDER — GLUCOSAMINE HCL/CHONDROITIN SU 500-400 MG
CAPSULE ORAL
Qty: 100 STRIP | Refills: 3 | Status: SHIPPED | OUTPATIENT
Start: 2024-06-17

## 2024-10-02 SDOH — ECONOMIC STABILITY: TRANSPORTATION INSECURITY
IN THE PAST 12 MONTHS, HAS LACK OF TRANSPORTATION KEPT YOU FROM MEETINGS, WORK, OR FROM GETTING THINGS NEEDED FOR DAILY LIVING?: NO

## 2024-10-02 SDOH — ECONOMIC STABILITY: INCOME INSECURITY: HOW HARD IS IT FOR YOU TO PAY FOR THE VERY BASICS LIKE FOOD, HOUSING, MEDICAL CARE, AND HEATING?: NOT VERY HARD

## 2024-10-02 SDOH — ECONOMIC STABILITY: FOOD INSECURITY: WITHIN THE PAST 12 MONTHS, THE FOOD YOU BOUGHT JUST DIDN'T LAST AND YOU DIDN'T HAVE MONEY TO GET MORE.: NEVER TRUE

## 2024-10-02 SDOH — ECONOMIC STABILITY: FOOD INSECURITY: WITHIN THE PAST 12 MONTHS, YOU WORRIED THAT YOUR FOOD WOULD RUN OUT BEFORE YOU GOT MONEY TO BUY MORE.: NEVER TRUE

## 2024-10-03 ENCOUNTER — OFFICE VISIT (OUTPATIENT)
Facility: CLINIC | Age: 69
End: 2024-10-03

## 2024-10-03 VITALS
BODY MASS INDEX: 29.26 KG/M2 | HEIGHT: 72 IN | OXYGEN SATURATION: 97 % | HEART RATE: 73 BPM | SYSTOLIC BLOOD PRESSURE: 118 MMHG | TEMPERATURE: 97.7 F | RESPIRATION RATE: 20 BRPM | WEIGHT: 216 LBS | DIASTOLIC BLOOD PRESSURE: 63 MMHG

## 2024-10-03 DIAGNOSIS — E11.65 TYPE 2 DIABETES MELLITUS WITH HYPERGLYCEMIA, WITHOUT LONG-TERM CURRENT USE OF INSULIN (HCC): Primary | ICD-10-CM

## 2024-10-03 DIAGNOSIS — R97.20 ELEVATED PROSTATE SPECIFIC ANTIGEN (PSA): ICD-10-CM

## 2024-10-03 DIAGNOSIS — N40.1 BENIGN PROSTATIC HYPERPLASIA WITH LOWER URINARY TRACT SYMPTOMS, SYMPTOM DETAILS UNSPECIFIED: ICD-10-CM

## 2024-10-03 DIAGNOSIS — N31.2 BLADDER ATONY: ICD-10-CM

## 2024-10-03 DIAGNOSIS — E78.5 HYPERLIPIDEMIA, UNSPECIFIED HYPERLIPIDEMIA TYPE: ICD-10-CM

## 2024-10-03 DIAGNOSIS — I10 ESSENTIAL (PRIMARY) HYPERTENSION: ICD-10-CM

## 2024-10-03 LAB — HBA1C MFR BLD: 6.7 %

## 2024-10-03 SDOH — ECONOMIC STABILITY: FOOD INSECURITY: WITHIN THE PAST 12 MONTHS, YOU WORRIED THAT YOUR FOOD WOULD RUN OUT BEFORE YOU GOT MONEY TO BUY MORE.: NEVER TRUE

## 2024-10-03 SDOH — ECONOMIC STABILITY: FOOD INSECURITY: WITHIN THE PAST 12 MONTHS, THE FOOD YOU BOUGHT JUST DIDN'T LAST AND YOU DIDN'T HAVE MONEY TO GET MORE.: NEVER TRUE

## 2024-10-03 SDOH — ECONOMIC STABILITY: INCOME INSECURITY: HOW HARD IS IT FOR YOU TO PAY FOR THE VERY BASICS LIKE FOOD, HOUSING, MEDICAL CARE, AND HEATING?: NOT HARD AT ALL

## 2024-10-03 NOTE — PROGRESS NOTES
1. \"Have you been to the ER, urgent care clinic since your last visit?  Hospitalized since your last visit?\"No    2. \"Have you seen or consulted any other health care providers outside of the [facility] since your last visit?\" No    3. For patients aged 45-75: Has the patient had a colonoscopy / FIT/ Cologuard? Yes      If the patient is female:    4. For patients aged 40-74: Has the patient had a mammogram within the past 2 years? Not applicable      5. For patients aged 21-65: Has the patient had a pap smear? Not applicable  
mouth daily (with breakfast) (Patient not taking: Reported on 10/3/2024) 90 tablet 1    potassium chloride (KLOR-CON M) 10 MEQ extended release tablet TAKE 1 TABLET BY MOUTH DAILY (Patient not taking: Reported on 10/3/2024) 90 tablet 1     No current facility-administered medications for this visit.       Allergies  No Known Allergies    Family History  Family History   Problem Relation Age of Onset    Heart Disease Father     Diabetes Brother     Cancer Mother     Hypertension Mother        Social History  Social History     Socioeconomic History    Marital status:      Spouse name: Not on file    Number of children: Not on file    Years of education: Not on file    Highest education level: Not on file   Occupational History    Not on file   Tobacco Use    Smoking status: Never    Smokeless tobacco: Never   Substance and Sexual Activity    Alcohol use: No    Drug use: No    Sexual activity: Yes     Partners: Female   Other Topics Concern    Not on file   Social History Narrative    Not on file     Social Determinants of Health     Financial Resource Strain: Medium Risk (7/17/2023)    Overall Financial Resource Strain (CARDIA)     Difficulty of Paying Living Expenses: Somewhat hard   Food Insecurity: Not on file (10/2/2024)   Transportation Needs: Unknown (7/17/2023)    PRAPARE - Transportation     Lack of Transportation (Medical): Not on file     Lack of Transportation (Non-Medical): No   Physical Activity: Sufficiently Active (11/20/2023)    Exercise Vital Sign     Days of Exercise per Week: 5 days     Minutes of Exercise per Session: 60 min   Stress: Not on file   Social Connections: Not on file   Intimate Partner Violence: Not on file   Housing Stability: Unknown (10/2/2024)    Housing Stability Vital Sign     Unable to Pay for Housing in the Last Year: Not on file     Number of Times Moved in the Last Year: Not on file     Homeless in the Last Year: No       Review of Systems  Review of Systems - Review

## 2024-10-15 DIAGNOSIS — E11.65 TYPE 2 DIABETES MELLITUS WITH HYPERGLYCEMIA, WITHOUT LONG-TERM CURRENT USE OF INSULIN (HCC): ICD-10-CM

## 2024-10-15 DIAGNOSIS — I10 ESSENTIAL (PRIMARY) HYPERTENSION: ICD-10-CM

## 2024-10-15 RX ORDER — HYDROCHLOROTHIAZIDE 25 MG/1
TABLET ORAL
Qty: 90 TABLET | Refills: 1 | Status: SHIPPED | OUTPATIENT
Start: 2024-10-15

## 2024-10-15 RX ORDER — GLUCOSAMINE HCL/CHONDROITIN SU 500-400 MG
CAPSULE ORAL
Qty: 100 STRIP | Refills: 3 | Status: SHIPPED | OUTPATIENT
Start: 2024-10-15

## 2024-10-15 RX ORDER — SILDENAFIL 100 MG/1
TABLET, FILM COATED ORAL
Qty: 30 TABLET | Refills: 5 | Status: SHIPPED | OUTPATIENT
Start: 2024-10-15

## 2024-10-15 RX ORDER — CANDESARTAN 32 MG/1
TABLET ORAL
Qty: 90 TABLET | Refills: 1 | Status: SHIPPED | OUTPATIENT
Start: 2024-10-15

## 2025-01-09 DIAGNOSIS — I10 ESSENTIAL (PRIMARY) HYPERTENSION: ICD-10-CM

## 2025-01-09 RX ORDER — HYDROCHLOROTHIAZIDE 25 MG/1
TABLET ORAL
Qty: 90 TABLET | Refills: 1 | OUTPATIENT
Start: 2025-01-09

## 2025-01-09 RX ORDER — CANDESARTAN 32 MG/1
TABLET ORAL
Qty: 90 TABLET | Refills: 1 | OUTPATIENT
Start: 2025-01-09

## 2025-01-18 SDOH — ECONOMIC STABILITY: FOOD INSECURITY: WITHIN THE PAST 12 MONTHS, YOU WORRIED THAT YOUR FOOD WOULD RUN OUT BEFORE YOU GOT MONEY TO BUY MORE.: NEVER TRUE

## 2025-01-18 SDOH — ECONOMIC STABILITY: FOOD INSECURITY: WITHIN THE PAST 12 MONTHS, THE FOOD YOU BOUGHT JUST DIDN'T LAST AND YOU DIDN'T HAVE MONEY TO GET MORE.: NEVER TRUE

## 2025-01-18 SDOH — HEALTH STABILITY: PHYSICAL HEALTH: ON AVERAGE, HOW MANY MINUTES DO YOU ENGAGE IN EXERCISE AT THIS LEVEL?: 130 MIN

## 2025-01-18 SDOH — ECONOMIC STABILITY: INCOME INSECURITY: IN THE LAST 12 MONTHS, WAS THERE A TIME WHEN YOU WERE NOT ABLE TO PAY THE MORTGAGE OR RENT ON TIME?: NO

## 2025-01-18 SDOH — ECONOMIC STABILITY: TRANSPORTATION INSECURITY
IN THE PAST 12 MONTHS, HAS THE LACK OF TRANSPORTATION KEPT YOU FROM MEDICAL APPOINTMENTS OR FROM GETTING MEDICATIONS?: NO

## 2025-01-18 SDOH — HEALTH STABILITY: PHYSICAL HEALTH: ON AVERAGE, HOW MANY DAYS PER WEEK DO YOU ENGAGE IN MODERATE TO STRENUOUS EXERCISE (LIKE A BRISK WALK)?: 6 DAYS

## 2025-01-18 ASSESSMENT — LIFESTYLE VARIABLES
HOW MANY STANDARD DRINKS CONTAINING ALCOHOL DO YOU HAVE ON A TYPICAL DAY: PATIENT DECLINED
HOW MANY STANDARD DRINKS CONTAINING ALCOHOL DO YOU HAVE ON A TYPICAL DAY: 98
HOW OFTEN DO YOU HAVE SIX OR MORE DRINKS ON ONE OCCASION: 1
HOW OFTEN DO YOU HAVE A DRINK CONTAINING ALCOHOL: 1
HOW OFTEN DO YOU HAVE A DRINK CONTAINING ALCOHOL: NEVER

## 2025-01-18 ASSESSMENT — PATIENT HEALTH QUESTIONNAIRE - PHQ9
SUM OF ALL RESPONSES TO PHQ QUESTIONS 1-9: 0
SUM OF ALL RESPONSES TO PHQ QUESTIONS 1-9: 0
SUM OF ALL RESPONSES TO PHQ9 QUESTIONS 1 & 2: 0
SUM OF ALL RESPONSES TO PHQ QUESTIONS 1-9: 0
1. LITTLE INTEREST OR PLEASURE IN DOING THINGS: NOT AT ALL
2. FEELING DOWN, DEPRESSED OR HOPELESS: NOT AT ALL
SUM OF ALL RESPONSES TO PHQ QUESTIONS 1-9: 0

## 2025-01-20 ENCOUNTER — OFFICE VISIT (OUTPATIENT)
Facility: CLINIC | Age: 70
End: 2025-01-20

## 2025-01-20 VITALS
SYSTOLIC BLOOD PRESSURE: 131 MMHG | HEIGHT: 72 IN | OXYGEN SATURATION: 94 % | WEIGHT: 216.2 LBS | HEART RATE: 77 BPM | TEMPERATURE: 97.2 F | BODY MASS INDEX: 29.28 KG/M2 | RESPIRATION RATE: 16 BRPM | DIASTOLIC BLOOD PRESSURE: 77 MMHG

## 2025-01-20 DIAGNOSIS — N31.2 BLADDER ATONY: ICD-10-CM

## 2025-01-20 DIAGNOSIS — Z00.00 MEDICARE ANNUAL WELLNESS VISIT, SUBSEQUENT: Primary | ICD-10-CM

## 2025-01-20 DIAGNOSIS — E11.9 COMPREHENSIVE DIABETIC FOOT EXAMINATION, TYPE 2 DM, ENCOUNTER FOR (HCC): ICD-10-CM

## 2025-01-20 DIAGNOSIS — N40.1 BENIGN PROSTATIC HYPERPLASIA WITH LOWER URINARY TRACT SYMPTOMS, SYMPTOM DETAILS UNSPECIFIED: ICD-10-CM

## 2025-01-20 DIAGNOSIS — R97.20 ELEVATED PROSTATE SPECIFIC ANTIGEN (PSA): ICD-10-CM

## 2025-01-20 DIAGNOSIS — E78.5 HYPERLIPIDEMIA, UNSPECIFIED HYPERLIPIDEMIA TYPE: ICD-10-CM

## 2025-01-20 DIAGNOSIS — I10 ESSENTIAL (PRIMARY) HYPERTENSION: ICD-10-CM

## 2025-01-20 DIAGNOSIS — D75.1 POLYCYTHEMIA: ICD-10-CM

## 2025-01-20 DIAGNOSIS — E11.65 TYPE 2 DIABETES MELLITUS WITH HYPERGLYCEMIA, WITHOUT LONG-TERM CURRENT USE OF INSULIN (HCC): ICD-10-CM

## 2025-01-20 DIAGNOSIS — N39.0 URINARY TRACT INFECTION WITHOUT HEMATURIA, SITE UNSPECIFIED: ICD-10-CM

## 2025-01-20 RX ORDER — CANDESARTAN 32 MG/1
TABLET ORAL
Qty: 90 TABLET | Refills: 1 | Status: SHIPPED | OUTPATIENT
Start: 2025-01-20

## 2025-01-20 RX ORDER — HYDROCHLOROTHIAZIDE 25 MG/1
TABLET ORAL
Qty: 90 TABLET | Refills: 1 | Status: SHIPPED | OUTPATIENT
Start: 2025-01-20

## 2025-01-20 NOTE — PROGRESS NOTES
.  \"Have you been to the ER, urgent care clinic since your last visit?  Hospitalized since your last visit?\"    NO    “Have you seen or consulted any other health care providers outside our system since your last visit?”    NO             
adenopathy  Lymphatics - no palpable lymphadenopathy  Chest - no tachypnea, retractions or cyanosis  Heart - S1 and S2 normal  Abdomen - no rebound tenderness noted  Back exam - limited range of motion  Neurological - abnormal neurological exam unchanged from prior examinations  Musculoskeletal - osteoarthritic changes noted in both hands  Extremities - intact peripheral pulses  Diabetic foot exam:   Left Foot:   Visual Exam: normal   Pulse DP: 2+ (normal)   Filament test: normal sensation     Right Foot:   Visual Exam: normal   Pulse DP: 2+ (normal)   Filament test: normal sensation     Results  No results found for this visit on 01/20/25.    ASSESSMENT and PLAN    ICD-10-CM    1. Type 2 diabetes mellitus with hyperglycemia, without long-term current use of insulin ([facility])  E11.65 Hemoglobin A1C      DIABETES FOOT EXAM      2. Essential (primary) hypertension  I10 Comprehensive Metabolic Panel     candesartan (ATACAND) 32 MG tablet     hydroCHLOROthiazide (HYDRODIURIL) 25 MG tablet      3. Elevated prostate specific antigen (PSA)  R97.20 PSA, Total and Free      4. Hyperlipidemia, unspecified hyperlipidemia type  E78.5 Lipid Panel      5. Bladder atony  N31.2 AMB POC URINALYSIS DIP STICK AUTO W/O MICRO     Culture, Urine      6. Benign prostatic hyperplasia with lower urinary tract symptoms, symptom details unspecified  N40.1       7. Polycythemia  D75.1 CBC with Auto Differential      8. Urinary tract infection without hematuria, site unspecified  N39.0 Culture, Urine     Urinalysis with Microscopic      9. Comprehensive diabetic foot examination, type 2 DM, encounter for (HCC)  E11.9  DIABETES FOOT EXAM      lab results and schedule of future lab studies reviewed with patient  reviewed diet, exercise and weight control  cardiovascular risk and specific lipid/LDL goals reviewed  reviewed medications and side effects in detail  specific diabetic recommendations: low cholesterol diet, weight control and daily

## 2025-07-15 LAB
A/G RATIO: 2 RATIO (ref 1.1–2.6)
ALBUMIN: 4.6 G/DL (ref 3.5–5)
ALP BLD-CCNC: 66 U/L (ref 40–125)
ALT SERPL-CCNC: 19 U/L (ref 5–40)
ANION GAP SERPL CALCULATED.3IONS-SCNC: 17 MMOL/L (ref 3–15)
AST SERPL-CCNC: 15 U/L (ref 10–37)
BACTERIA, URINE: PRESENT
BASOPHILS # BLD: 1 % (ref 0–2)
BASOPHILS ABSOLUTE: 0.1 K/UL (ref 0–0.2)
BILIRUB SERPL-MCNC: 0.9 MG/DL (ref 0.2–1.2)
BILIRUBIN, URINE: NEGATIVE
BUN BLDV-MCNC: 19 MG/DL (ref 6–22)
CALCIUM SERPL-MCNC: 9.6 MG/DL (ref 8.4–10.5)
CHLORIDE BLD-SCNC: 98 MMOL/L (ref 98–110)
CLARITY, UA: CLEAR
CO2: 26 MMOL/L (ref 20–32)
COLOR, UA: YELLOW
CREAT SERPL-MCNC: 0.9 MG/DL (ref 0.8–1.6)
EOSINOPHIL # BLD: 3 % (ref 0–6)
EOSINOPHILS ABSOLUTE: 0.3 K/UL (ref 0–0.5)
ESTIMATED AVERAGE GLUCOSE: 127 MG/DL (ref 91–123)
GFR, ESTIMATED: >90 ML/MIN/1.73 SQ.M.
GLOBULIN: 2.3 G/DL (ref 2–4)
GLUCOSE URINE: NEGATIVE MG/DL
GLUCOSE: 87 MG/DL (ref 70–99)
HBA1C MFR BLD: 6.1 % (ref 4.8–5.6)
HCT VFR BLD CALC: 50.8 % (ref 37.8–52.2)
HEMOGLOBIN: 17.4 G/DL (ref 12.6–17.1)
HYALINE CASTS: ABNORMAL /LPF (ref 0–2)
KETONES, URINE: NEGATIVE MG/DL
LEUKOCYTE ESTERASE, URINE: ABNORMAL
LYMPHOCYTES # BLD: 24 % (ref 20–45)
LYMPHOCYTES ABSOLUTE: 2.3 K/UL (ref 1–4.8)
MCH RBC QN AUTO: 30 PG (ref 26–34)
MCHC RBC AUTO-ENTMCNC: 34 G/DL (ref 31–36)
MCV RBC AUTO: 87 FL (ref 80–95)
MONOCYTES ABSOLUTE: 0.8 K/UL (ref 0.1–1)
MONOCYTES: 8 % (ref 3–12)
NEUTROPHILS ABSOLUTE: 6.1 K/UL (ref 1.8–7.7)
NEUTROPHILS SEGMENTED: 65 % (ref 40–75)
NITRITE, URINE: NEGATIVE
OCCULT BLOOD,URINE: NEGATIVE
PDW BLD-RTO: 13.6 % (ref 10–15.5)
PH, URINE: 5.5 PH (ref 5–8)
PLATELET # BLD: 213 K/UL (ref 140–440)
PMV BLD AUTO: 11.5 FL (ref 9–13)
POTASSIUM SERPL-SCNC: 3.4 MMOL/L (ref 3.5–5.5)
PROTEIN, URINE: NEGATIVE MG/DL
RBC # BLD: 5.81 M/UL (ref 3.8–5.8)
RBC URINE: ABNORMAL /HPF
SODIUM BLD-SCNC: 141 MMOL/L (ref 133–145)
SPECIFIC GRAVITY UA: 1.01 (ref 1–1.03)
SQUAMOUS EPITHELIAL CELLS: ABNORMAL /HPF
TOTAL PROTEIN: 6.9 G/DL (ref 6.2–8.1)
UROBILINOGEN, URINE: 0.2 MG/DL
WBC # BLD: 9.4 K/UL (ref 4–11)
WBC URINE: ABNORMAL /HPF (ref 0–2)

## 2025-07-16 LAB
% FREE PSA: 22 %
CHOLESTEROL, TOTAL: 184 MG/DL (ref 110–200)
CHOLESTEROL/HDL RATIO: 5.1 (ref 0–5)
HDLC SERPL-MCNC: 36 MG/DL
LDL CHOLESTEROL: 132 MG/DL (ref 50–99)
LDL/HDL RATIO: 3.7
NON-HDL CHOLESTEROL: 148 MG/DL
PROSTATE SPECIFIC ANTIGEN FREE: 1.1 NG/ML
PROSTATE SPECIFIC ANTIGEN: 4.89 NG/ML
TRIGL SERPL-MCNC: 77 MG/DL (ref 40–149)
VLDLC SERPL CALC-MCNC: 15 MG/DL (ref 8–30)

## 2025-07-17 DIAGNOSIS — D75.1 POLYCYTHEMIA: Primary | ICD-10-CM

## 2025-07-17 LAB
URINE CULTURE RESULT: ABNORMAL
URINE CULTURE RESULT: ABNORMAL

## 2025-07-17 RX ORDER — POTASSIUM CHLORIDE 750 MG/1
10 TABLET, EXTENDED RELEASE ORAL DAILY
Qty: 30 TABLET | Refills: 5 | Status: SHIPPED | OUTPATIENT
Start: 2025-07-17

## 2025-07-17 RX ORDER — ATORVASTATIN CALCIUM 20 MG/1
20 TABLET, FILM COATED ORAL DAILY
Qty: 30 TABLET | Refills: 3 | Status: SHIPPED | OUTPATIENT
Start: 2025-07-17

## 2025-07-20 DIAGNOSIS — I10 ESSENTIAL (PRIMARY) HYPERTENSION: ICD-10-CM

## 2025-07-21 ENCOUNTER — OFFICE VISIT (OUTPATIENT)
Facility: CLINIC | Age: 70
End: 2025-07-21

## 2025-07-21 VITALS
RESPIRATION RATE: 18 BRPM | OXYGEN SATURATION: 97 % | SYSTOLIC BLOOD PRESSURE: 110 MMHG | DIASTOLIC BLOOD PRESSURE: 62 MMHG | HEIGHT: 72 IN | TEMPERATURE: 97.8 F | BODY MASS INDEX: 28.58 KG/M2 | WEIGHT: 211 LBS | HEART RATE: 77 BPM

## 2025-07-21 DIAGNOSIS — R73.9 HYPERGLYCEMIA: ICD-10-CM

## 2025-07-21 DIAGNOSIS — Z87.891 ENCOUNTER FOR SCREENING FOR ABDOMINAL AORTIC ANEURYSM (AAA) IN PATIENT 50 YEARS OF AGE OR OLDER WITH HISTORY OF SMOKING: ICD-10-CM

## 2025-07-21 DIAGNOSIS — R97.20 ELEVATED PROSTATE SPECIFIC ANTIGEN (PSA): ICD-10-CM

## 2025-07-21 DIAGNOSIS — D75.1 POLYCYTHEMIA: ICD-10-CM

## 2025-07-21 DIAGNOSIS — E78.5 HYPERLIPIDEMIA, UNSPECIFIED HYPERLIPIDEMIA TYPE: ICD-10-CM

## 2025-07-21 DIAGNOSIS — Z13.6 ENCOUNTER FOR SCREENING FOR ABDOMINAL AORTIC ANEURYSM (AAA) IN PATIENT 50 YEARS OF AGE OR OLDER WITH HISTORY OF SMOKING: ICD-10-CM

## 2025-07-21 DIAGNOSIS — E11.65 TYPE 2 DIABETES MELLITUS WITH HYPERGLYCEMIA, WITHOUT LONG-TERM CURRENT USE OF INSULIN (HCC): ICD-10-CM

## 2025-07-21 DIAGNOSIS — I10 ESSENTIAL (PRIMARY) HYPERTENSION: Primary | ICD-10-CM

## 2025-07-21 DIAGNOSIS — N40.1 BENIGN PROSTATIC HYPERPLASIA WITH LOWER URINARY TRACT SYMPTOMS, SYMPTOM DETAILS UNSPECIFIED: ICD-10-CM

## 2025-07-21 RX ORDER — CANDESARTAN 32 MG/1
TABLET ORAL
Qty: 90 TABLET | Refills: 1 | Status: SHIPPED | OUTPATIENT
Start: 2025-07-21

## 2025-07-21 RX ORDER — CANDESARTAN 32 MG/1
32 TABLET ORAL DAILY
Qty: 90 TABLET | Refills: 1 | OUTPATIENT
Start: 2025-07-21

## 2025-07-21 RX ORDER — HYDROCHLOROTHIAZIDE 25 MG/1
TABLET ORAL
Qty: 90 TABLET | Refills: 1 | Status: SHIPPED | OUTPATIENT
Start: 2025-07-21

## 2025-07-21 SDOH — ECONOMIC STABILITY: FOOD INSECURITY: WITHIN THE PAST 12 MONTHS, THE FOOD YOU BOUGHT JUST DIDN'T LAST AND YOU DIDN'T HAVE MONEY TO GET MORE.: NEVER TRUE

## 2025-07-21 SDOH — ECONOMIC STABILITY: FOOD INSECURITY: WITHIN THE PAST 12 MONTHS, YOU WORRIED THAT YOUR FOOD WOULD RUN OUT BEFORE YOU GOT MONEY TO BUY MORE.: NEVER TRUE

## 2025-07-21 NOTE — PROGRESS NOTES
PAWEL El comes in for follow up care.  HTN: Patient has hypertension.  He is on HCTZ 25 mg daily and Atacand 32 mg daily.  Stable on these medications.  He will take a low-sodium diet.  Patient will continue with the current treatment plan.  Diabetes mellitus: Patient has type 2 diabetes mellitus.  He is doing lifestyle and dietary modification.  Last HbA1c was 6.1.  Continue current management plan.  Hypokalemia: Patient has been noted to have hypokalemia.  We have sent in a prescription of potassium replacement therapy daily.  Patient will take this medication.  Will recheck labs at next visit.  Polycythemia: Patient has a history of polycythemia.  He will continue to follow-up with the hematologist.  Dyslipidemia: Patient has dyslipidemia.  Patient prefers supportive care measures.  He will exercise and take a diet low in polysaturated fats.  We discussed medication management.  I will send in Lipitor 20 mg to take daily.  Elevated PSA: Patient has elevated PSA.  He has been followed up by the urologist.  He has a follow-up appointment scheduled next month.  BPH/LUTS: Patient has neurogenic bladder and lower urinary tract symptoms.   Patient had placement of InterStim device done by the urologist.  Patient has done self-catheterization in the past.  Currently denies dysuria or hematuria.  He will continue to follow-up with the urologist.  Patient is on Macrobid for UTI symptoms.  HM: I will order AAA Screening test.      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

## 2025-07-25 DIAGNOSIS — I10 ESSENTIAL (PRIMARY) HYPERTENSION: ICD-10-CM

## 2025-07-25 RX ORDER — HYDROCHLOROTHIAZIDE 25 MG/1
25 TABLET ORAL DAILY
Qty: 90 TABLET | Refills: 1 | OUTPATIENT
Start: 2025-07-25

## 2025-07-31 ENCOUNTER — HOSPITAL ENCOUNTER (OUTPATIENT)
Facility: HOSPITAL | Age: 70
Discharge: HOME | End: 2025-07-31
Payer: MEDICARE

## 2025-07-31 DIAGNOSIS — Z87.891 ENCOUNTER FOR SCREENING FOR ABDOMINAL AORTIC ANEURYSM (AAA) IN PATIENT 50 YEARS OF AGE OR OLDER WITH HISTORY OF SMOKING: ICD-10-CM

## 2025-07-31 DIAGNOSIS — Z13.6 ENCOUNTER FOR SCREENING FOR ABDOMINAL AORTIC ANEURYSM (AAA) IN PATIENT 50 YEARS OF AGE OR OLDER WITH HISTORY OF SMOKING: ICD-10-CM

## 2025-07-31 PROCEDURE — 76706 US ABDL AORTA SCREEN AAA: CPT
